# Patient Record
Sex: FEMALE | Race: WHITE | Employment: OTHER | URBAN - METROPOLITAN AREA
[De-identification: names, ages, dates, MRNs, and addresses within clinical notes are randomized per-mention and may not be internally consistent; named-entity substitution may affect disease eponyms.]

---

## 2017-02-04 ENCOUNTER — TELEPHONE (OUTPATIENT)
Dept: FAMILY MEDICINE CLINIC | Facility: CLINIC | Age: 82
End: 2017-02-04

## 2017-02-04 NOTE — TELEPHONE ENCOUNTER
Spoke with patient, has vomited 2 x since 4:00am,  states he gave her tylenol at 5:00am, Pt does recall taking this,  report giving her small amounts of liquid now and she has kept it down, discussed the importance of saying hydrated, however

## 2017-02-27 ENCOUNTER — OFFICE VISIT (OUTPATIENT)
Dept: FAMILY MEDICINE CLINIC | Facility: CLINIC | Age: 82
End: 2017-02-27

## 2017-02-27 VITALS
RESPIRATION RATE: 24 BRPM | HEIGHT: 60.5 IN | SYSTOLIC BLOOD PRESSURE: 110 MMHG | WEIGHT: 191.63 LBS | BODY MASS INDEX: 36.65 KG/M2 | HEART RATE: 64 BPM | TEMPERATURE: 98 F | DIASTOLIC BLOOD PRESSURE: 60 MMHG

## 2017-02-27 DIAGNOSIS — E11.3293 MILD NONPROLIFERATIVE DIABETIC RETINOPATHY OF BOTH EYES ASSOCIATED WITH TYPE 2 DIABETES MELLITUS, MACULAR EDEMA PRESENCE UNSPECIFIED (HCC): ICD-10-CM

## 2017-02-27 DIAGNOSIS — E03.9 ACQUIRED HYPOTHYROIDISM: ICD-10-CM

## 2017-02-27 DIAGNOSIS — N39.46 MIXED INCONTINENCE: ICD-10-CM

## 2017-02-27 DIAGNOSIS — R80.9 MICROALBUMINURIA DUE TO TYPE 2 DIABETES MELLITUS (HCC): ICD-10-CM

## 2017-02-27 DIAGNOSIS — Z86.19 HISTORY OF HEPATITIS: ICD-10-CM

## 2017-02-27 DIAGNOSIS — M54.50 CHRONIC MIDLINE LOW BACK PAIN WITHOUT SCIATICA: ICD-10-CM

## 2017-02-27 DIAGNOSIS — E78.2 MIXED HYPERLIPIDEMIA: ICD-10-CM

## 2017-02-27 DIAGNOSIS — I10 HYPERTENSION, BENIGN: ICD-10-CM

## 2017-02-27 DIAGNOSIS — G89.29 CHRONIC MIDLINE LOW BACK PAIN WITHOUT SCIATICA: ICD-10-CM

## 2017-02-27 DIAGNOSIS — I25.10 CORONARY ARTERY DISEASE INVOLVING NATIVE CORONARY ARTERY OF NATIVE HEART WITHOUT ANGINA PECTORIS: ICD-10-CM

## 2017-02-27 DIAGNOSIS — E11.29 MICROALBUMINURIA DUE TO TYPE 2 DIABETES MELLITUS (HCC): ICD-10-CM

## 2017-02-27 DIAGNOSIS — B02.29 POST HERPETIC NEURALGIA: ICD-10-CM

## 2017-02-27 DIAGNOSIS — Z11.59 NEED FOR HEPATITIS C SCREENING TEST: ICD-10-CM

## 2017-02-27 DIAGNOSIS — N18.30 TYPE 2 DIABETES MELLITUS WITH STAGE 3 CHRONIC KIDNEY DISEASE, WITHOUT LONG-TERM CURRENT USE OF INSULIN (HCC): Primary | ICD-10-CM

## 2017-02-27 DIAGNOSIS — E11.22 TYPE 2 DIABETES MELLITUS WITH STAGE 3 CHRONIC KIDNEY DISEASE, WITHOUT LONG-TERM CURRENT USE OF INSULIN (HCC): Primary | ICD-10-CM

## 2017-02-27 PROCEDURE — 99214 OFFICE O/P EST MOD 30 MIN: CPT | Performed by: FAMILY MEDICINE

## 2017-02-27 RX ORDER — VALACYCLOVIR HYDROCHLORIDE 1 G/1
1 TABLET, FILM COATED ORAL EVERY 12 HOURS SCHEDULED
Qty: 14 TABLET | Refills: 0 | Status: SHIPPED | OUTPATIENT
Start: 2017-02-27 | End: 2017-03-06

## 2017-02-27 NOTE — PROGRESS NOTES
Patient presents with:  Blood Pressure: 6 month BP check. Has not had labs done. HPI:   Miko Thornton is a [de-identified]year old female with PMH CAD stenting in 1990's, DM2, HLD, hypothyroidism, HTN who presents to the office for 6 mo check up.     DM2 - with rhonchi, symmetric air entry  Heart - normal rate, regular rhythm, normal S1, S2, no murmurs, rubs, clicks or gallops  Abdomen - soft, nontender, nondistended, no masses or organomegaly  Neurological - alert, oriented, normal speech, no focal findings or m meds (tolerodine or oxybutynin)    10. History of hepatitis  Check labs  ami melgar h/o infectious hep, but cannot remember what. - Hepatitis B Surface Antigen [E]; Future  - Hepatitis B Surface Antibody [E]; Future  - HCV Antibody [E]; Future    11.  Mi

## 2017-03-06 ENCOUNTER — LAB ENCOUNTER (OUTPATIENT)
Dept: LAB | Age: 82
End: 2017-03-06
Attending: FAMILY MEDICINE
Payer: MEDICARE

## 2017-03-06 DIAGNOSIS — Z11.59 NEED FOR HEPATITIS C SCREENING TEST: ICD-10-CM

## 2017-03-06 DIAGNOSIS — R80.9 MICROALBUMINURIA DUE TO TYPE 2 DIABETES MELLITUS (HCC): ICD-10-CM

## 2017-03-06 DIAGNOSIS — E11.29 MICROALBUMINURIA DUE TO TYPE 2 DIABETES MELLITUS (HCC): ICD-10-CM

## 2017-03-06 DIAGNOSIS — E11.22 TYPE 2 DIABETES MELLITUS WITH STAGE 3 CHRONIC KIDNEY DISEASE, WITHOUT LONG-TERM CURRENT USE OF INSULIN (HCC): ICD-10-CM

## 2017-03-06 DIAGNOSIS — Z86.19 HISTORY OF HEPATITIS: ICD-10-CM

## 2017-03-06 DIAGNOSIS — E03.9 ACQUIRED HYPOTHYROIDISM: ICD-10-CM

## 2017-03-06 DIAGNOSIS — N18.30 TYPE 2 DIABETES MELLITUS WITH STAGE 3 CHRONIC KIDNEY DISEASE, WITHOUT LONG-TERM CURRENT USE OF INSULIN (HCC): ICD-10-CM

## 2017-03-06 DIAGNOSIS — I25.10 CORONARY ARTERY DISEASE INVOLVING NATIVE CORONARY ARTERY OF NATIVE HEART WITHOUT ANGINA PECTORIS: ICD-10-CM

## 2017-03-06 DIAGNOSIS — I10 HYPERTENSION, BENIGN: ICD-10-CM

## 2017-03-06 LAB
ALBUMIN SERPL-MCNC: 3.4 G/DL (ref 3.5–4.8)
ALP LIVER SERPL-CCNC: 85 U/L (ref 55–142)
ALT SERPL-CCNC: 65 U/L (ref 14–54)
AST SERPL-CCNC: 44 U/L (ref 15–41)
BILIRUB SERPL-MCNC: 0.3 MG/DL (ref 0.1–2)
BUN BLD-MCNC: 38 MG/DL (ref 8–20)
CALCIUM BLD-MCNC: 9.6 MG/DL (ref 8.3–10.3)
CHLORIDE: 106 MMOL/L (ref 101–111)
CHOLEST SMN-MCNC: 152 MG/DL (ref ?–200)
CO2: 27 MMOL/L (ref 22–32)
CREAT BLD-MCNC: 2.14 MG/DL (ref 0.55–1.02)
CREAT UR-SCNC: 85.6 MG/DL
EST. AVERAGE GLUCOSE BLD GHB EST-MCNC: 143 MG/DL (ref 68–126)
FREE T4: 1.5 NG/DL (ref 0.9–1.8)
GLUCOSE BLD-MCNC: 98 MG/DL (ref 70–99)
HBA1C MFR BLD HPLC: 6.6 % (ref ?–5.7)
HBV SURFACE AB SER QL: NONREACTIVE
HBV SURFACE AB SERPL IA-ACNC: <3.1 MIU/ML
HBV SURFACE AG SERPL QL IA: NONREACTIVE
HDLC SERPL-MCNC: 51 MG/DL (ref 45–?)
HDLC SERPL: 2.98 {RATIO} (ref ?–4.44)
HEPATITIS C VIRUS AB INTERPRETATION: NONREACTIVE
LDLC SERPL CALC-MCNC: 77 MG/DL (ref ?–130)
M PROTEIN MFR SERPL ELPH: 6.9 G/DL (ref 6.1–8.3)
MICROALBUMIN UR-MCNC: 10.2 MG/DL
MICROALBUMIN/CREAT 24H UR-RTO: 119.2 UG/MG (ref ?–30)
NONHDLC SERPL-MCNC: 101 MG/DL (ref ?–130)
POTASSIUM SERPL-SCNC: 3.4 MMOL/L (ref 3.6–5.1)
SODIUM SERPL-SCNC: 144 MMOL/L (ref 136–144)
TRIGLYCERIDES: 121 MG/DL (ref ?–150)
TSI SER-ACNC: 0.24 MIU/ML (ref 0.35–5.5)
VLDL: 24 MG/DL (ref 5–40)

## 2017-03-06 PROCEDURE — 83036 HEMOGLOBIN GLYCOSYLATED A1C: CPT

## 2017-03-06 PROCEDURE — 86706 HEP B SURFACE ANTIBODY: CPT

## 2017-03-06 PROCEDURE — 87340 HEPATITIS B SURFACE AG IA: CPT

## 2017-03-06 PROCEDURE — 84443 ASSAY THYROID STIM HORMONE: CPT

## 2017-03-06 PROCEDURE — 82043 UR ALBUMIN QUANTITATIVE: CPT

## 2017-03-06 PROCEDURE — 36415 COLL VENOUS BLD VENIPUNCTURE: CPT

## 2017-03-06 PROCEDURE — 82570 ASSAY OF URINE CREATININE: CPT

## 2017-03-06 PROCEDURE — 80061 LIPID PANEL: CPT

## 2017-03-06 PROCEDURE — 80053 COMPREHEN METABOLIC PANEL: CPT

## 2017-03-06 PROCEDURE — 86803 HEPATITIS C AB TEST: CPT

## 2017-03-06 PROCEDURE — 84439 ASSAY OF FREE THYROXINE: CPT

## 2017-03-08 DIAGNOSIS — R94.4 ABNORMAL RESULTS OF KIDNEY FUNCTION STUDIES: Primary | ICD-10-CM

## 2017-03-12 DIAGNOSIS — E78.2 MIXED HYPERLIPIDEMIA: Primary | ICD-10-CM

## 2017-03-14 RX ORDER — ROSUVASTATIN CALCIUM 40 MG/1
TABLET, COATED ORAL
Qty: 90 TABLET | Refills: 1 | Status: SHIPPED | OUTPATIENT
Start: 2017-03-14 | End: 2017-08-28

## 2017-03-14 NOTE — TELEPHONE ENCOUNTER
Medication refilled per protocol.     LOV 2/27/2017    Future Appointments  Date Time Provider Lissette Kilpatricki   4/17/2017 9:40 AM Karmen Mock MD [facility]   8/28/2017 8:30 AM Karri Diaz MD [facility]         Pending Prescriptio

## 2017-04-11 ENCOUNTER — APPOINTMENT (OUTPATIENT)
Dept: LAB | Age: 82
End: 2017-04-11
Attending: FAMILY MEDICINE
Payer: MEDICARE

## 2017-04-11 DIAGNOSIS — R94.4 ABNORMAL RESULTS OF KIDNEY FUNCTION STUDIES: ICD-10-CM

## 2017-04-11 PROCEDURE — 80048 BASIC METABOLIC PNL TOTAL CA: CPT

## 2017-04-11 PROCEDURE — 36415 COLL VENOUS BLD VENIPUNCTURE: CPT

## 2017-04-12 DIAGNOSIS — R94.4 ABNORMAL RESULTS OF KIDNEY FUNCTION STUDIES: Primary | ICD-10-CM

## 2017-04-17 PROBLEM — I25.10 CORONARY ARTERY DISEASE INVOLVING NATIVE CORONARY ARTERY OF NATIVE HEART WITHOUT ANGINA PECTORIS: Status: ACTIVE | Noted: 2017-04-17

## 2017-04-17 PROBLEM — R94.31 NONSPECIFIC ABNORMAL ELECTROCARDIOGRAM (ECG) (EKG): Status: ACTIVE | Noted: 2017-04-17

## 2017-04-18 ENCOUNTER — OFFICE VISIT (OUTPATIENT)
Dept: NEPHROLOGY | Facility: CLINIC | Age: 82
End: 2017-04-18

## 2017-04-18 VITALS
SYSTOLIC BLOOD PRESSURE: 70 MMHG | RESPIRATION RATE: 16 BRPM | HEART RATE: 68 BPM | DIASTOLIC BLOOD PRESSURE: 40 MMHG | BODY MASS INDEX: 37 KG/M2 | WEIGHT: 194 LBS

## 2017-04-18 DIAGNOSIS — I95.9 HYPOTENSION, UNSPECIFIED HYPOTENSION TYPE: ICD-10-CM

## 2017-04-18 DIAGNOSIS — N18.3 CKD (CHRONIC KIDNEY DISEASE), STAGE 3 (MODERATE): ICD-10-CM

## 2017-04-18 DIAGNOSIS — N17.9 AKI (ACUTE KIDNEY INJURY) (HCC): Primary | ICD-10-CM

## 2017-04-18 PROCEDURE — 99204 OFFICE O/P NEW MOD 45 MIN: CPT | Performed by: INTERNAL MEDICINE

## 2017-04-18 NOTE — PROGRESS NOTES
Nephrology Consult Note    REASON FOR CONSULT: LUCIEN / CKD     ASSESSMENT/PLAN:        1) LUCIEN / CKD- recently higher serum creatinine exceeding 2 mg/dL (previously < 1 mg/dl in 2015) likely reflects renal hypoperfusion due to relative hypotension; she is ort history of obstructive uropathy. She denies chronic analgesic use. There is no family history of kidney disease. There is no history of lung or liver disease. She is otherwise been in good health overall.     ROS:    Denies fever/chills  Denies wt loss/ EVERY DAY Disp: 90 tablet Rfl: 3   metoprolol Tartrate 25 MG Oral Tab Take 1 tablet (25 mg total) by mouth 2 (two) times daily. Disp: 180 tablet Rfl: 3   Multiple Vitamin (DAILY VITAMIN) Oral Tab Take by mouth.  Disp:  Rfl:    acetaminophen (TYLENOL) 325 MG Regular rate and rhythm, S1, S2 normal, no murmur or rub  Lungs: Clear without wheezes, rales, rhonchi. Abdomen: Soft, non-tender. + bowel sounds, no palpable organomegaly  Extremities: Without clubbing, cyanosis or edema.   Neurologic:  normal affect, c

## 2017-05-02 ENCOUNTER — APPOINTMENT (OUTPATIENT)
Dept: LAB | Age: 82
End: 2017-05-02
Attending: INTERNAL MEDICINE
Payer: MEDICARE

## 2017-05-02 DIAGNOSIS — N18.3 CKD (CHRONIC KIDNEY DISEASE), STAGE 3 (MODERATE): ICD-10-CM

## 2017-05-02 DIAGNOSIS — I95.9 HYPOTENSION, UNSPECIFIED HYPOTENSION TYPE: ICD-10-CM

## 2017-05-02 DIAGNOSIS — N17.9 AKI (ACUTE KIDNEY INJURY) (HCC): ICD-10-CM

## 2017-05-02 PROCEDURE — 86255 FLUORESCENT ANTIBODY SCREEN: CPT

## 2017-05-02 PROCEDURE — 81003 URINALYSIS AUTO W/O SCOPE: CPT

## 2017-05-02 PROCEDURE — 83883 ASSAY NEPHELOMETRY NOT SPEC: CPT

## 2017-05-02 PROCEDURE — 80048 BASIC METABOLIC PNL TOTAL CA: CPT

## 2017-05-02 PROCEDURE — 83876 ASSAY MYELOPEROXIDASE: CPT

## 2017-05-02 PROCEDURE — 84165 PROTEIN E-PHORESIS SERUM: CPT

## 2017-05-02 PROCEDURE — 83516 IMMUNOASSAY NONANTIBODY: CPT

## 2017-05-02 PROCEDURE — 36415 COLL VENOUS BLD VENIPUNCTURE: CPT

## 2017-05-02 PROCEDURE — 86334 IMMUNOFIX E-PHORESIS SERUM: CPT

## 2017-05-11 ENCOUNTER — OFFICE VISIT (OUTPATIENT)
Dept: NEPHROLOGY | Facility: CLINIC | Age: 82
End: 2017-05-11

## 2017-05-11 VITALS
DIASTOLIC BLOOD PRESSURE: 60 MMHG | WEIGHT: 194.75 LBS | OXYGEN SATURATION: 98 % | RESPIRATION RATE: 16 BRPM | HEART RATE: 71 BPM | SYSTOLIC BLOOD PRESSURE: 120 MMHG | BODY MASS INDEX: 37 KG/M2

## 2017-05-11 DIAGNOSIS — I95.9 HYPOTENSION, UNSPECIFIED HYPOTENSION TYPE: ICD-10-CM

## 2017-05-11 DIAGNOSIS — N18.30 CKD (CHRONIC KIDNEY DISEASE) STAGE 3, GFR 30-59 ML/MIN (HCC): ICD-10-CM

## 2017-05-11 DIAGNOSIS — N17.9 AKI (ACUTE KIDNEY INJURY) (HCC): Primary | ICD-10-CM

## 2017-05-11 PROCEDURE — 99214 OFFICE O/P EST MOD 30 MIN: CPT | Performed by: INTERNAL MEDICINE

## 2017-05-11 NOTE — PROGRESS NOTES
Nephrology Progress Note      ASSESSMENT/PLAN:         1) LUCIEN / CKD- recently higher serum creatinine 2.45 mg/dL was due to symptomatic hypotension leading to renal hypoperfusion; she had been orthostatic over the previous several weeks and had a seated BP CABG  1995    Comment x5    SURGICAL STENT      Comment Coronary Artery Stent    LEG/ANKLE SURGERY PROC UNLISTED  1997    Comment angioplasty of left leg    LEG/ANKLE SURGERY PROC UNLISTED  05/2002    Comment angioplasty of right leg    TONSILLECTOMY  1 mouth daily.  Disp:  Rfl:        Allergies:  No Known Allergies    ROS:     Denies fever/chills  Denies wt loss/gain  Denies HA or visual changes  Denies CP or palpitations  Denies SOB/cough/hemoptysis  Denies abd or flank pain  Denies N/V/D  Denies change

## 2017-06-15 ENCOUNTER — OFFICE VISIT (OUTPATIENT)
Dept: FAMILY MEDICINE CLINIC | Facility: CLINIC | Age: 82
End: 2017-06-15

## 2017-06-15 VITALS
TEMPERATURE: 98 F | WEIGHT: 192 LBS | HEART RATE: 72 BPM | SYSTOLIC BLOOD PRESSURE: 100 MMHG | BODY MASS INDEX: 37 KG/M2 | DIASTOLIC BLOOD PRESSURE: 60 MMHG | RESPIRATION RATE: 14 BRPM

## 2017-06-15 DIAGNOSIS — J20.9 ACUTE BRONCHITIS WITH SYMPTOMS GREATER THAN 10 DAYS: Primary | ICD-10-CM

## 2017-06-15 DIAGNOSIS — E11.22 TYPE 2 DIABETES MELLITUS WITH STAGE 3 CHRONIC KIDNEY DISEASE, WITHOUT LONG-TERM CURRENT USE OF INSULIN (HCC): ICD-10-CM

## 2017-06-15 DIAGNOSIS — N18.30 TYPE 2 DIABETES MELLITUS WITH STAGE 3 CHRONIC KIDNEY DISEASE, WITHOUT LONG-TERM CURRENT USE OF INSULIN (HCC): ICD-10-CM

## 2017-06-15 PROCEDURE — 99213 OFFICE O/P EST LOW 20 MIN: CPT | Performed by: FAMILY MEDICINE

## 2017-06-15 RX ORDER — AZITHROMYCIN 250 MG/1
TABLET, FILM COATED ORAL
Qty: 6 TABLET | Refills: 0 | Status: SHIPPED | OUTPATIENT
Start: 2017-06-15 | End: 2017-08-28 | Stop reason: ALTCHOICE

## 2017-06-15 NOTE — PROGRESS NOTES
Patient presents with:  Chest Congestion: for three days now- with sone night sweats  Cough: with thick yellow sputum- taking Mucinex ans Tylenol      HPI:   This is a 81year old female coming in for 3 days of cough and phlegm, wheezing tosya as well  No Negative for joint swelling. Skin: Negative. Negative for rash. Neurological: Negative. Negative for dizziness. EXAM:   /60 mmHg  Pulse 72  Temp(Src) 97.7 °F (36.5 °C) (Oral)  Resp 14  Wt 192 lb Body mass index is 36.87 kg/(m^2).    Physi lung fields with a bronchial breath sounds but clear on the right worrisome for possible early pneumonia, start Z-Germán and Dulera inhaler samples given, 2 puffs twice daily for 2 weeks, follow-up with Dr. Yvonne Boston if no great improvement and chest x-ray arran

## 2017-06-16 ENCOUNTER — HOSPITAL ENCOUNTER (OUTPATIENT)
Dept: GENERAL RADIOLOGY | Age: 82
Discharge: HOME | End: 2017-06-16
Attending: FAMILY MEDICINE
Payer: MEDICARE

## 2017-06-16 DIAGNOSIS — J20.9 ACUTE BRONCHITIS WITH SYMPTOMS GREATER THAN 10 DAYS: ICD-10-CM

## 2017-06-16 PROCEDURE — 71020 XR CHEST PA + LAT CHEST (CPT=71020): CPT | Performed by: FAMILY MEDICINE

## 2017-07-19 ENCOUNTER — PATIENT OUTREACH (OUTPATIENT)
Dept: CASE MANAGEMENT | Age: 82
End: 2017-07-19

## 2017-07-19 NOTE — PROGRESS NOTES
Spoke with pt to intro CCM. Pt requested more info, which will be sent in mail. Pt would also like to see if  is candidate and if he can be sent info as well.

## 2017-08-02 ENCOUNTER — PATIENT OUTREACH (OUTPATIENT)
Dept: CASE MANAGEMENT | Age: 82
End: 2017-08-02

## 2017-08-02 NOTE — PROGRESS NOTES
Called pt back regarding CCM info that was sent. Stated did received info for her and spouse and would like to speak with Dr. Harry Diaz about CCM program at next office visit 8/28. Will contact pt after 8/28 office visit.

## 2017-08-21 ENCOUNTER — TELEPHONE (OUTPATIENT)
Dept: FAMILY MEDICINE CLINIC | Facility: CLINIC | Age: 82
End: 2017-08-21

## 2017-08-21 NOTE — TELEPHONE ENCOUNTER
Patient is calling she said she is coming in for her cholesterol follow up on 8/28/17 and wants to know if Dr. Nas Caruso wants her lipids checked prior to her appt.

## 2017-08-21 NOTE — TELEPHONE ENCOUNTER
Would you like pt to recheck Lipids now ? Pt coming in on 08/28/17 for appt. Last Lipid 03/06/17. Routed to Dr Yunior Ly.

## 2017-08-28 ENCOUNTER — OFFICE VISIT (OUTPATIENT)
Dept: FAMILY MEDICINE CLINIC | Facility: CLINIC | Age: 82
End: 2017-08-28

## 2017-08-28 VITALS
HEART RATE: 72 BPM | TEMPERATURE: 98 F | DIASTOLIC BLOOD PRESSURE: 58 MMHG | BODY MASS INDEX: 37.03 KG/M2 | HEIGHT: 60.75 IN | SYSTOLIC BLOOD PRESSURE: 120 MMHG | RESPIRATION RATE: 20 BRPM | WEIGHT: 193.63 LBS

## 2017-08-28 DIAGNOSIS — I10 HYPERTENSION, BENIGN: Primary | ICD-10-CM

## 2017-08-28 DIAGNOSIS — E78.2 MIXED HYPERLIPIDEMIA: ICD-10-CM

## 2017-08-28 DIAGNOSIS — E03.9 ACQUIRED HYPOTHYROIDISM: ICD-10-CM

## 2017-08-28 DIAGNOSIS — K21.9 GASTROESOPHAGEAL REFLUX DISEASE, ESOPHAGITIS PRESENCE NOT SPECIFIED: ICD-10-CM

## 2017-08-28 DIAGNOSIS — M17.0 BILATERAL PRIMARY OSTEOARTHRITIS OF KNEE: ICD-10-CM

## 2017-08-28 DIAGNOSIS — I25.812 CORONARY ARTERY DISEASE INVOLVING BYPASS GRAFT OF TRANSPLANTED HEART, ANGINA PRESENCE UNSPECIFIED: ICD-10-CM

## 2017-08-28 DIAGNOSIS — N39.46 MIXED INCONTINENCE: ICD-10-CM

## 2017-08-28 PROCEDURE — 99214 OFFICE O/P EST MOD 30 MIN: CPT | Performed by: FAMILY MEDICINE

## 2017-08-28 RX ORDER — PANTOPRAZOLE SODIUM 40 MG/1
40 TABLET, DELAYED RELEASE ORAL DAILY
Qty: 90 TABLET | Refills: 3 | Status: SHIPPED | OUTPATIENT
Start: 2017-08-28 | End: 2017-10-28

## 2017-08-28 RX ORDER — ROSUVASTATIN CALCIUM 40 MG/1
40 TABLET, COATED ORAL
Qty: 90 TABLET | Refills: 3 | Status: SHIPPED | OUTPATIENT
Start: 2017-08-28 | End: 2018-09-09

## 2017-08-28 RX ORDER — EZETIMIBE 10 MG/1
10 TABLET ORAL DAILY
Qty: 90 TABLET | Refills: 3 | Status: SHIPPED | OUTPATIENT
Start: 2017-08-28 | End: 2018-10-22

## 2017-08-28 RX ORDER — LEVOTHYROXINE SODIUM 88 UG/1
TABLET ORAL
Qty: 90 TABLET | Refills: 3 | Status: SHIPPED | OUTPATIENT
Start: 2017-08-28 | End: 2018-03-01

## 2017-08-28 NOTE — PROGRESS NOTES
Patient presents with:  Blood Pressure: 6 month BP check   To discuss CCM program     HPI:   Diann Nava is a 81year old female with PMH HLD, hypothyroidism, HTN, CAD, DM2 who presents to the office for 6 mo check up    HTN - BP controlled at this poi symmetric  Neurologic: Alert and oriented X 3, normal strength and tone. Normal symmetric reflexes.  Normal coordination and gait      Component      Latest Ref Rng & Units 5/2/2017   URINE-COLOR      Yellow Straw   CLARITY URINE      Clear Clear   SPECIFIC Pressure (6 month BP check   To discuss CCM program). 1. Hypertension, benign  Controlled  No more ACEI or ARB for kidney reasons. BP remains controlled.    - metoprolol Tartrate 25 MG Oral Tab; TAKE 1 TABLET(25 MG) BY MOUTH TWICE DAILY  Dispense: 180 t

## 2017-09-20 ENCOUNTER — PATIENT OUTREACH (OUTPATIENT)
Dept: CASE MANAGEMENT | Age: 82
End: 2017-09-20

## 2017-09-20 NOTE — PROGRESS NOTES
Contacted  Pt to follow up on CCM.  Pt stated that she is not interested and declined participation in program.

## 2017-09-27 ENCOUNTER — TELEPHONE (OUTPATIENT)
Dept: FAMILY MEDICINE CLINIC | Facility: CLINIC | Age: 82
End: 2017-09-27

## 2017-10-03 ENCOUNTER — OFFICE VISIT (OUTPATIENT)
Dept: FAMILY MEDICINE CLINIC | Facility: CLINIC | Age: 82
End: 2017-10-03

## 2017-10-03 VITALS
SYSTOLIC BLOOD PRESSURE: 116 MMHG | HEART RATE: 68 BPM | WEIGHT: 192 LBS | TEMPERATURE: 98 F | RESPIRATION RATE: 16 BRPM | BODY MASS INDEX: 36.72 KG/M2 | DIASTOLIC BLOOD PRESSURE: 64 MMHG | HEIGHT: 60.5 IN

## 2017-10-03 DIAGNOSIS — Z78.0 POSTMENOPAUSAL: ICD-10-CM

## 2017-10-03 DIAGNOSIS — Z13.31 DEPRESSION SCREENING: ICD-10-CM

## 2017-10-03 DIAGNOSIS — N18.30 TYPE 2 DIABETES MELLITUS WITH STAGE 3 CHRONIC KIDNEY DISEASE, WITHOUT LONG-TERM CURRENT USE OF INSULIN (HCC): ICD-10-CM

## 2017-10-03 DIAGNOSIS — E03.9 ACQUIRED HYPOTHYROIDISM: ICD-10-CM

## 2017-10-03 DIAGNOSIS — R80.9 MICROALBUMINURIA DUE TO TYPE 2 DIABETES MELLITUS (HCC): ICD-10-CM

## 2017-10-03 DIAGNOSIS — I10 HYPERTENSION, BENIGN: ICD-10-CM

## 2017-10-03 DIAGNOSIS — E11.3293 MILD NONPROLIFERATIVE DIABETIC RETINOPATHY OF BOTH EYES WITHOUT MACULAR EDEMA ASSOCIATED WITH TYPE 2 DIABETES MELLITUS (HCC): ICD-10-CM

## 2017-10-03 DIAGNOSIS — E11.22 TYPE 2 DIABETES MELLITUS WITH STAGE 3 CHRONIC KIDNEY DISEASE, WITHOUT LONG-TERM CURRENT USE OF INSULIN (HCC): ICD-10-CM

## 2017-10-03 DIAGNOSIS — E11.29 MICROALBUMINURIA DUE TO TYPE 2 DIABETES MELLITUS (HCC): ICD-10-CM

## 2017-10-03 DIAGNOSIS — Z12.31 VISIT FOR SCREENING MAMMOGRAM: ICD-10-CM

## 2017-10-03 DIAGNOSIS — Z00.00 ENCOUNTER FOR ANNUAL HEALTH EXAMINATION: Primary | ICD-10-CM

## 2017-10-03 DIAGNOSIS — E55.9 VITAMIN D DEFICIENCY: ICD-10-CM

## 2017-10-03 DIAGNOSIS — I25.10 CORONARY ARTERY DISEASE INVOLVING NATIVE CORONARY ARTERY OF NATIVE HEART WITHOUT ANGINA PECTORIS: ICD-10-CM

## 2017-10-03 DIAGNOSIS — Z23 NEED FOR VACCINATION: ICD-10-CM

## 2017-10-03 DIAGNOSIS — E78.2 MIXED HYPERLIPIDEMIA: ICD-10-CM

## 2017-10-03 PROBLEM — R94.31 NONSPECIFIC ABNORMAL ELECTROCARDIOGRAM (ECG) (EKG): Status: RESOLVED | Noted: 2017-04-17 | Resolved: 2017-10-03

## 2017-10-03 PROCEDURE — G0444 DEPRESSION SCREEN ANNUAL: HCPCS | Performed by: FAMILY MEDICINE

## 2017-10-03 PROCEDURE — 99214 OFFICE O/P EST MOD 30 MIN: CPT | Performed by: FAMILY MEDICINE

## 2017-10-03 PROCEDURE — G0439 PPPS, SUBSEQ VISIT: HCPCS | Performed by: FAMILY MEDICINE

## 2017-10-03 PROCEDURE — 90653 IIV ADJUVANT VACCINE IM: CPT | Performed by: FAMILY MEDICINE

## 2017-10-03 PROCEDURE — 90732 PPSV23 VACC 2 YRS+ SUBQ/IM: CPT | Performed by: FAMILY MEDICINE

## 2017-10-03 PROCEDURE — G0009 ADMIN PNEUMOCOCCAL VACCINE: HCPCS | Performed by: FAMILY MEDICINE

## 2017-10-03 PROCEDURE — G0008 ADMIN INFLUENZA VIRUS VAC: HCPCS | Performed by: FAMILY MEDICINE

## 2017-10-03 NOTE — PROGRESS NOTES
HPI:   Susanna Bassett is a 81year old female who presents for a Medicare Subsequent Annual Wellness visit (Pt already had Initial Annual Wellness). Preventative  Breast: 6/2015  Colon: no indication. Immunizations: PNA 13 last yr. Flu annually.   El ALT 65 (H) 03/06/2017   CA 9.5 05/02/2017   ALB 3.4 (L) 03/06/2017   TSH 0.240 (L) 03/06/2017   CREATSERUM 1.43 (H) 05/02/2017   GLU 96 05/02/2017        CBC  (most recent labs)     Lab Results  Component Value Date   WBC 5.5 12/10/2015   HGB 12.8 12/10/ hysterectomy (1985); hysterectomy; angioplasty (coronary) (5/30/02); and angioplasty (coronary) (6/23/04). Her family history includes Heart Disease in her brother and mother. SOCIAL HISTORY:   She  reports that she quit smoking about 22 years ago.  Sh Nose: Nares normal, septum midline, mucosa normal, no drainage or sinus tenderness   Throat: Lips, mucosa, and tongue normal; teeth and gums normal   Neck: Supple, symmetrical, trachea midline, no adenopathy, thyroid: not enlarged, symmetric, no tenderne DEPRESSION SCREEN ANNUAL    5. Need for vaccination  Given x 2  - PNEUMOCOCCAL IMM (PNEUMOVAX)  - FLU VACCINE ADJUVANT IM    6.  Coronary artery disease involving native coronary artery of native heart without angina pectoris  No current cardiac symptoms  ; not have a Power of  for [employer] on file in [address] [place] [address] Rd. Discussed with patient and provided information           PLAN:  The patient indicates understanding of these issues and agrees to the plan. Return in 6 months (on 4/3/2018).      Sherice Bagley memory issues?: 0-No    Fall/Risk Scorin    Scoring Interpretation: 4+ At Risk     Depression Screening (PHQ-2/PHQ-9): Over the LAST 2 WEEKS   Little interest or pleasure in doing things (over the last two weeks)?: Not at all    Feeling down, depressed Ophthalmology Visit Annually: Diabetics, FHx Glaucoma, AA>50, > 65 Data entered on: 11/19/2016   Last Dilated Eye Exam 11/19/2016       Bone Density Screening      Dexascan Every two years Last Dexa Scan:   XR DEXA BONE DENSITOMETRY (CPT=77080) 06/ diuretics, anticonvulsants.)    Potassium  Annually Potassium (mmol/L)   Date Value   05/02/2017 3.9     POTASSIUM (mmol/L)   Date Value   03/21/2014 4.0   02/05/2013 3.9    No flowsheet data found.     Creatinine  Annually CREATININE (mg/dL)   Date Value

## 2017-10-03 NOTE — PATIENT INSTRUCTIONS
[address] SCREENING SCHEDULE   Tests on this list are recommended by your physician but may not be covered, or covered at this frequency, by your insurer. Please check with your insurance carrier before scheduling to verify coverage.    PREVENTATI previous visit.  Limited to patients who meet one of the following criteria:   • Men who are 73-68 years old and have smoked more than 100 cigarettes in their lifetime   • Anyone with a family history    Colorectal Cancer Screening  Covered up to Age 76 due on 06/10/2016 Please get this Mammogram regularly   Immunizations      Influenza  Covered Annually   Orders placed or performed in visit on 09/27/16  -FLU VAC NO PRSV 4 SHERMAN 3 YRS+   Orders placed or performed in visit on 10/07/15  -FLU VAC NO PRSV 4 [place] information including definitions of the different types of Advance Directives. It also has the State forms available on it's website for anyone to review and print using their home computer and printer. (the forms are also available in [address])  [email or web address] "[email or web address]

## 2017-10-16 ENCOUNTER — TELEPHONE (OUTPATIENT)
Dept: FAMILY MEDICINE CLINIC | Facility: CLINIC | Age: 82
End: 2017-10-16

## 2017-10-19 PROBLEM — E78.00 HYPERCHOLESTEROLEMIA: Status: ACTIVE | Noted: 2017-10-19

## 2017-10-28 DIAGNOSIS — K21.9 GASTROESOPHAGEAL REFLUX DISEASE, ESOPHAGITIS PRESENCE NOT SPECIFIED: ICD-10-CM

## 2017-10-28 DIAGNOSIS — E03.9 ACQUIRED HYPOTHYROIDISM: ICD-10-CM

## 2017-10-30 RX ORDER — PANTOPRAZOLE SODIUM 40 MG/1
TABLET, DELAYED RELEASE ORAL
Qty: 90 TABLET | Refills: 0 | Status: SHIPPED | OUTPATIENT
Start: 2017-10-30 | End: 2018-03-01

## 2017-10-30 NOTE — TELEPHONE ENCOUNTER
Called Pt and advised that Dr Ambreen Adorno would like Pt to have labs done. Asked in message that Pt call office tomorrow and advise if Pt can get blood drawn before this refill is sent.

## 2017-10-31 ENCOUNTER — APPOINTMENT (OUTPATIENT)
Dept: LAB | Age: 82
End: 2017-10-31
Attending: FAMILY MEDICINE
Payer: MEDICARE

## 2017-10-31 DIAGNOSIS — E78.2 MIXED HYPERLIPIDEMIA: ICD-10-CM

## 2017-10-31 DIAGNOSIS — I10 HYPERTENSION, BENIGN: ICD-10-CM

## 2017-10-31 DIAGNOSIS — E55.9 VITAMIN D DEFICIENCY: ICD-10-CM

## 2017-10-31 DIAGNOSIS — E11.22 TYPE 2 DIABETES MELLITUS WITH STAGE 3 CHRONIC KIDNEY DISEASE, WITHOUT LONG-TERM CURRENT USE OF INSULIN (HCC): ICD-10-CM

## 2017-10-31 DIAGNOSIS — N18.30 TYPE 2 DIABETES MELLITUS WITH STAGE 3 CHRONIC KIDNEY DISEASE, WITHOUT LONG-TERM CURRENT USE OF INSULIN (HCC): ICD-10-CM

## 2017-10-31 DIAGNOSIS — E03.9 ACQUIRED HYPOTHYROIDISM: ICD-10-CM

## 2017-10-31 PROCEDURE — 83036 HEMOGLOBIN GLYCOSYLATED A1C: CPT

## 2017-10-31 PROCEDURE — 82043 UR ALBUMIN QUANTITATIVE: CPT

## 2017-10-31 PROCEDURE — 36415 COLL VENOUS BLD VENIPUNCTURE: CPT

## 2017-10-31 PROCEDURE — 82306 VITAMIN D 25 HYDROXY: CPT

## 2017-10-31 PROCEDURE — 80061 LIPID PANEL: CPT

## 2017-10-31 PROCEDURE — 82570 ASSAY OF URINE CREATININE: CPT

## 2017-10-31 PROCEDURE — 84443 ASSAY THYROID STIM HORMONE: CPT

## 2017-10-31 PROCEDURE — 80053 COMPREHEN METABOLIC PANEL: CPT

## 2017-11-02 RX ORDER — LEVOTHYROXINE SODIUM 88 UG/1
TABLET ORAL
Qty: 90 TABLET | Refills: 0 | OUTPATIENT
Start: 2017-11-02

## 2018-01-17 DIAGNOSIS — E03.9 ACQUIRED HYPOTHYROIDISM: ICD-10-CM

## 2018-01-17 RX ORDER — LEVOTHYROXINE SODIUM 88 UG/1
TABLET ORAL
Qty: 90 TABLET | Refills: 1 | Status: SHIPPED | OUTPATIENT
Start: 2018-01-17 | End: 2018-03-01

## 2018-02-13 ENCOUNTER — TELEPHONE (OUTPATIENT)
Dept: FAMILY MEDICINE CLINIC | Facility: CLINIC | Age: 83
End: 2018-02-13

## 2018-02-13 NOTE — TELEPHONE ENCOUNTER
Patient dropped off form that she needs completed for renewal of parking placard. Patient would like mailed to  of [place] once completed.  Form in triage

## 2018-02-13 NOTE — TELEPHONE ENCOUNTER
Spoke with patient to let her know that her paperwork is complete and ready to . Let her know there is a $25 charge, she verbalized understanding.

## 2018-02-20 ENCOUNTER — HOSPITAL ENCOUNTER (OUTPATIENT)
Dept: MAMMOGRAPHY | Age: 83
Discharge: HOME | End: 2018-02-20
Attending: FAMILY MEDICINE
Payer: MEDICARE

## 2018-02-20 ENCOUNTER — HOSPITAL ENCOUNTER (OUTPATIENT)
Dept: BONE DENSITY | Age: 83
Discharge: HOME | End: 2018-02-20
Attending: FAMILY MEDICINE
Payer: MEDICARE

## 2018-02-20 DIAGNOSIS — Z12.31 VISIT FOR SCREENING MAMMOGRAM: ICD-10-CM

## 2018-02-20 DIAGNOSIS — Z78.0 POSTMENOPAUSAL: ICD-10-CM

## 2018-02-20 PROCEDURE — 77080 DXA BONE DENSITY AXIAL: CPT | Performed by: FAMILY MEDICINE

## 2018-02-20 PROCEDURE — 77067 SCR MAMMO BI INCL CAD: CPT | Performed by: FAMILY MEDICINE

## 2018-02-20 NOTE — PROGRESS NOTES
Discussed bone scan results with patient by phone telling it shows strong bones per Liana Brewer. He recommends staying active. Patient verbalizes understanding.

## 2018-03-01 ENCOUNTER — APPOINTMENT (OUTPATIENT)
Dept: GENERAL RADIOLOGY | Facility: HOSPITAL | Age: 83
DRG: 640 | End: 2018-03-01
Attending: EMERGENCY MEDICINE
Payer: MEDICARE

## 2018-03-01 ENCOUNTER — HOSPITAL ENCOUNTER (INPATIENT)
Facility: HOSPITAL | Age: 83
LOS: 3 days | Discharge: HOME | DRG: 640 | End: 2018-03-04
Attending: EMERGENCY MEDICINE | Admitting: INTERNAL MEDICINE
Payer: MEDICARE

## 2018-03-01 DIAGNOSIS — R41.0 DISORIENTATION: Primary | ICD-10-CM

## 2018-03-01 DIAGNOSIS — I95.9 HYPOTENSION, UNSPECIFIED HYPOTENSION TYPE: ICD-10-CM

## 2018-03-01 DIAGNOSIS — E87.6 HYPOKALEMIA: ICD-10-CM

## 2018-03-01 LAB
ADENOVIRUS PCR:: NEGATIVE
ALBUMIN SERPL-MCNC: 3.2 G/DL (ref 3.5–4.8)
ALP LIVER SERPL-CCNC: 70 U/L (ref 55–142)
ALT SERPL-CCNC: 58 U/L (ref 14–54)
AST SERPL-CCNC: 59 U/L (ref 15–41)
B PERT DNA SPEC QL NAA+PROBE: NEGATIVE
BASOPHILS # BLD AUTO: 0.02 X10(3) UL (ref 0–0.1)
BASOPHILS NFR BLD AUTO: 0.2 %
BILIRUB SERPL-MCNC: 0.4 MG/DL (ref 0.1–2)
BILIRUB UR QL STRIP.AUTO: NEGATIVE
BUN BLD-MCNC: 29 MG/DL (ref 8–20)
C PNEUM DNA SPEC QL NAA+PROBE: NEGATIVE
CALCIUM BLD-MCNC: 8.9 MG/DL (ref 8.3–10.3)
CHLORIDE: 103 MMOL/L (ref 101–111)
CO2: 23 MMOL/L (ref 22–32)
CORONAVIRUS 229E PCR:: NEGATIVE
CORONAVIRUS HKU1 PCR:: NEGATIVE
CORONAVIRUS NL63 PCR:: NEGATIVE
CORONAVIRUS OC43 PCR:: NEGATIVE
CREAT BLD-MCNC: 1.75 MG/DL (ref 0.55–1.02)
EOSINOPHIL # BLD AUTO: 0 X10(3) UL (ref 0–0.3)
EOSINOPHIL NFR BLD AUTO: 0 %
ERYTHROCYTE [DISTWIDTH] IN BLOOD BY AUTOMATED COUNT: 14.6 % (ref 11.5–16)
FLUAV H3 RNA SPEC QL NAA+PROBE: POSITIVE
FLUBV RNA SPEC QL NAA+PROBE: NEGATIVE
GLUCOSE BLD-MCNC: 100 MG/DL (ref 70–99)
GLUCOSE UR STRIP.AUTO-MCNC: NEGATIVE MG/DL
GRAN CASTS #/AREA URNS LPF: PRESENT /LPF
HCT VFR BLD AUTO: 39.9 % (ref 34–50)
HGB BLD-MCNC: 13.5 G/DL (ref 12–16)
IMMATURE GRANULOCYTE COUNT: 0.03 X10(3) UL (ref 0–1)
IMMATURE GRANULOCYTE RATIO %: 0.3 %
LACTIC ACID: 1.9 MMOL/L (ref 0.5–2)
LEUKOCYTE ESTERASE UR QL STRIP.AUTO: NEGATIVE
LYMPHOCYTES # BLD AUTO: 1.25 X10(3) UL (ref 0.9–4)
LYMPHOCYTES NFR BLD AUTO: 12.5 %
M PROTEIN MFR SERPL ELPH: 6.6 G/DL (ref 6.1–8.3)
MCH RBC QN AUTO: 29.5 PG (ref 27–33.2)
MCHC RBC AUTO-ENTMCNC: 33.8 G/DL (ref 31–37)
MCV RBC AUTO: 87.3 FL (ref 81–100)
METAPNEUMOVIRUS PCR:: NEGATIVE
MONOCYTES # BLD AUTO: 0.79 X10(3) UL (ref 0.1–1)
MONOCYTES NFR BLD AUTO: 7.9 %
MYCOPLASMA PNEUMONIA PCR:: NEGATIVE
NEUTROPHIL ABS PRELIM: 7.91 X10 (3) UL (ref 1.3–6.7)
NEUTROPHILS # BLD AUTO: 7.91 X10(3) UL (ref 1.3–6.7)
NEUTROPHILS NFR BLD AUTO: 79.1 %
NITRITE UR QL STRIP.AUTO: NEGATIVE
PARAINFLUENZA 1 PCR:: NEGATIVE
PARAINFLUENZA 2 PCR:: NEGATIVE
PARAINFLUENZA 3 PCR:: NEGATIVE
PARAINFLUENZA 4 PCR:: NEGATIVE
PH UR STRIP.AUTO: 5 [PH] (ref 4.5–8)
PLATELET # BLD AUTO: 169 10(3)UL (ref 150–450)
POTASSIUM SERPL-SCNC: 3.1 MMOL/L (ref 3.6–5.1)
PROT UR STRIP.AUTO-MCNC: 100 MG/DL
RBC # BLD AUTO: 4.57 X10(6)UL (ref 3.8–5.1)
RED CELL DISTRIBUTION WIDTH-SD: 46.5 FL (ref 35.1–46.3)
RHINOVIRUS/ENTERO PCR:: NEGATIVE
RSV RNA SPEC QL NAA+PROBE: NEGATIVE
SODIUM SERPL-SCNC: 139 MMOL/L (ref 136–144)
SP GR UR STRIP.AUTO: 1.03 (ref 1–1.03)
UROBILINOGEN UR STRIP.AUTO-MCNC: <2 MG/DL
WBC # BLD AUTO: 10 X10(3) UL (ref 4–13)

## 2018-03-01 PROCEDURE — 99223 1ST HOSP IP/OBS HIGH 75: CPT | Performed by: HOSPITALIST

## 2018-03-01 PROCEDURE — 71045 X-RAY EXAM CHEST 1 VIEW: CPT | Performed by: EMERGENCY MEDICINE

## 2018-03-01 RX ORDER — ACETAMINOPHEN 325 MG/1
325 TABLET ORAL EVERY 6 HOURS PRN
Status: DISCONTINUED | OUTPATIENT
Start: 2018-03-01 | End: 2018-03-04

## 2018-03-01 RX ORDER — LEVOTHYROXINE SODIUM 88 UG/1
88 TABLET ORAL
Status: DISCONTINUED | OUTPATIENT
Start: 2018-03-01 | End: 2018-03-04

## 2018-03-01 RX ORDER — ACETAMINOPHEN 325 MG/1
650 TABLET ORAL EVERY 6 HOURS PRN
Status: DISCONTINUED | OUTPATIENT
Start: 2018-03-01 | End: 2018-03-04

## 2018-03-01 RX ORDER — SODIUM CHLORIDE 9 MG/ML
INJECTION, SOLUTION INTRAVENOUS ONCE
Status: COMPLETED | OUTPATIENT
Start: 2018-03-01 | End: 2018-03-01

## 2018-03-01 RX ORDER — VALSARTAN 160 MG/1
160 TABLET ORAL DAILY
COMMUNITY
End: 2018-03-01

## 2018-03-01 RX ORDER — EZETIMIBE 10 MG/1
10 TABLET ORAL DAILY
Status: DISCONTINUED | OUTPATIENT
Start: 2018-03-01 | End: 2018-03-04

## 2018-03-01 RX ORDER — PANTOPRAZOLE SODIUM 40 MG/1
40 TABLET, DELAYED RELEASE ORAL
COMMUNITY
End: 2019-04-08

## 2018-03-01 RX ORDER — POTASSIUM CHLORIDE 20 MEQ/1
40 TABLET, EXTENDED RELEASE ORAL ONCE
Status: COMPLETED | OUTPATIENT
Start: 2018-03-01 | End: 2018-03-01

## 2018-03-01 RX ORDER — OSELTAMIVIR PHOSPHATE 75 MG/1
75 CAPSULE ORAL DAILY
Status: DISCONTINUED | OUTPATIENT
Start: 2018-03-01 | End: 2018-03-04

## 2018-03-01 RX ORDER — HEPARIN SODIUM 5000 [USP'U]/ML
5000 INJECTION, SOLUTION INTRAVENOUS; SUBCUTANEOUS EVERY 8 HOURS SCHEDULED
Status: DISCONTINUED | OUTPATIENT
Start: 2018-03-01 | End: 2018-03-04

## 2018-03-01 RX ORDER — ASPIRIN 325 MG
325 TABLET, DELAYED RELEASE (ENTERIC COATED) ORAL DAILY
Status: DISCONTINUED | OUTPATIENT
Start: 2018-03-01 | End: 2018-03-04

## 2018-03-01 RX ORDER — LEVOTHYROXINE SODIUM 88 UG/1
88 TABLET ORAL
COMMUNITY
End: 2018-10-25

## 2018-03-01 RX ORDER — ROSUVASTATIN CALCIUM 20 MG/1
40 TABLET, COATED ORAL NIGHTLY
Status: DISCONTINUED | OUTPATIENT
Start: 2018-03-01 | End: 2018-03-04

## 2018-03-01 RX ORDER — POTASSIUM CHLORIDE 20 MEQ/1
40 TABLET, EXTENDED RELEASE ORAL EVERY 4 HOURS
Status: COMPLETED | OUTPATIENT
Start: 2018-03-01 | End: 2018-03-01

## 2018-03-01 RX ORDER — SODIUM CHLORIDE 9 MG/ML
INJECTION, SOLUTION INTRAVENOUS CONTINUOUS
Status: DISCONTINUED | OUTPATIENT
Start: 2018-03-01 | End: 2018-03-04

## 2018-03-01 NOTE — ED PROVIDER NOTES
Patient Seen in: [facility] Emergency Department    History   Patient presents with:  Fever (infectious)    Stated Complaint: generalized weakness, flu-like symptoms.     HPI    28-year-old female here with her daughter for fever, fatigue, altered me 1/1/1995  Smokeless tobacco: Never Used                      Alcohol use: Yes           0.0 - 0.6 oz/week     Standard drinks or equivalent: 0 - 1 per week     Comment: 1 per month      Review of Systems    Positive for stated complaint: generalized weakne -American 31 (*)     AST 59 (*)     Alt 58 (*)     Albumin 3.2 (*)     Potassium 3.1 (*)     All other components within normal limits   CBC W/ DIFFERENTIAL - Abnormal; Notable for the following:     RDW-SD 46.5 (*)     Neutrophil Absolute Prelim 7. (primary encounter diagnosis)  Hypotension, unspecified hypotension type  Hypokalemia    Disposition:  Admit  3/1/2018  2:10 pm    Follow-up:  No follow-up provider specified.       Medications Prescribed:  Current Discharge Medication List        Present o

## 2018-03-01 NOTE — H&P
[facility]PITAL[facility]  History and Physical     Sam Toro Patient Status:  Emergency    3/15/1935 MRN [id number]   Location [address] Attending Alli Gramajo MD   [address] Day # 0 PCP Devora Hernadez MD     Chief Complaint: co No Known Allergies    Medications:    No current facility-administered medications on file prior to encounter.    Current Outpatient Prescriptions on File Prior to Encounter:  Multiple Vitamins-Minerals (OCUVITE PRESERVISION OR) Take 1 tablet by mouth daily Labs:  Recent Labs   Lab  03/01/18   1115   WBC  10.0   HGB  13.5   MCV  87.3   PLT  169.0       Recent Labs   Lab  03/01/18   1115   GLU  100*   BUN  29*   CREATSERUM  1.75*   GFRAA  31*   GFRNAA  27*   CA  8.9   ALB  3.2*   NA  139   K  3.1*   CL  10

## 2018-03-01 NOTE — PROGRESS NOTES
NURSING ADMISSION NOTE      Patient admitted via Cart  Oriented to room. Safety precautions initiated. Bed in low position. Call light in reach. Patient admitted from E.R. Patient alert and oriented x4.  Patient with occasional productive cough, gre

## 2018-03-02 PROBLEM — I95.9 HYPOTENSION: Status: ACTIVE | Noted: 2018-03-01

## 2018-03-02 LAB
BASOPHILS # BLD AUTO: 0.02 X10(3) UL (ref 0–0.1)
BASOPHILS NFR BLD AUTO: 0.3 %
BILIRUB UR QL STRIP.AUTO: NEGATIVE
BUN BLD-MCNC: 28 MG/DL (ref 8–20)
CALCIUM BLD-MCNC: 8.6 MG/DL (ref 8.3–10.3)
CHLORIDE: 110 MMOL/L (ref 101–111)
CO2: 22 MMOL/L (ref 22–32)
COLOR UR AUTO: YELLOW
CREAT BLD-MCNC: 1.25 MG/DL (ref 0.55–1.02)
EOSINOPHIL # BLD AUTO: 0 X10(3) UL (ref 0–0.3)
EOSINOPHIL NFR BLD AUTO: 0 %
ERYTHROCYTE [DISTWIDTH] IN BLOOD BY AUTOMATED COUNT: 14.9 % (ref 11.5–16)
GLUCOSE BLD-MCNC: 80 MG/DL (ref 70–99)
GLUCOSE UR STRIP.AUTO-MCNC: NEGATIVE MG/DL
HCT VFR BLD AUTO: 36.6 % (ref 34–50)
HGB BLD-MCNC: 12 G/DL (ref 12–16)
IMMATURE GRANULOCYTE COUNT: 0.01 X10(3) UL (ref 0–1)
IMMATURE GRANULOCYTE RATIO %: 0.1 %
KETONES UR STRIP.AUTO-MCNC: NEGATIVE MG/DL
LEUKOCYTE ESTERASE UR QL STRIP.AUTO: NEGATIVE
LYMPHOCYTES # BLD AUTO: 1.44 X10(3) UL (ref 0.9–4)
LYMPHOCYTES NFR BLD AUTO: 21.4 %
MCH RBC QN AUTO: 29 PG (ref 27–33.2)
MCHC RBC AUTO-ENTMCNC: 32.8 G/DL (ref 31–37)
MCV RBC AUTO: 88.4 FL (ref 81–100)
MONOCYTES # BLD AUTO: 0.56 X10(3) UL (ref 0.1–1)
MONOCYTES NFR BLD AUTO: 8.3 %
NEUTROPHIL ABS PRELIM: 4.69 X10 (3) UL (ref 1.3–6.7)
NEUTROPHILS # BLD AUTO: 4.69 X10(3) UL (ref 1.3–6.7)
NEUTROPHILS NFR BLD AUTO: 69.9 %
NITRITE UR QL STRIP.AUTO: NEGATIVE
PH UR STRIP.AUTO: 5 [PH] (ref 4.5–8)
PLATELET # BLD AUTO: 155 10(3)UL (ref 150–450)
POTASSIUM SERPL-SCNC: 5.1 MMOL/L (ref 3.6–5.1)
POTASSIUM SERPL-SCNC: 5.1 MMOL/L (ref 3.6–5.1)
PROT UR STRIP.AUTO-MCNC: 30 MG/DL
RBC # BLD AUTO: 4.14 X10(6)UL (ref 3.8–5.1)
RED CELL DISTRIBUTION WIDTH-SD: 48.2 FL (ref 35.1–46.3)
SODIUM SERPL-SCNC: 140 MMOL/L (ref 136–144)
SP GR UR STRIP.AUTO: 1.01 (ref 1–1.03)
UROBILINOGEN UR STRIP.AUTO-MCNC: <2 MG/DL
WBC # BLD AUTO: 6.7 X10(3) UL (ref 4–13)

## 2018-03-02 PROCEDURE — 99232 SBSQ HOSP IP/OBS MODERATE 35: CPT | Performed by: HOSPITALIST

## 2018-03-02 NOTE — PROGRESS NOTES
Alert,oriented. No complaint of pain. Potassium given orally this shift,Repeat lab is 5. 1. IV fluids ongoing. Low grade temp,blood cultures drawn on the 02/29. Tylenol 2 tabs given this AM.Droplet and fall precautions observed.

## 2018-03-02 NOTE — PROGRESS NOTES
[facility]ITAL[facility]  Progress Note     Keo Flatter Patient Status:  Inpatient    3/15/1935 MRN [id number]   [facility] 4NW-A Attending Carlos Diehl MD   Hosp Day # 1 PCP Nixon Padilla MD     Chief Complaint: sob fever confusion    S: Sodium (Porcine)  5,000 Units Subcutaneous [facility]   • cefTRIAXone  1 g Intravenous Q24H   • Oseltamivir Phosphate  75 mg Oral Daily       ASSESSMENT / PLAN:     1. Acute bronchitis and influenza illness started on Tamiflu and clinically better  2.  Hx of c

## 2018-03-02 NOTE — CM/SW NOTE
03/02/18 1100   CM/SW Referral Data   Referral Source Social Work (self-referral)   Reason for Referral Discharge planning   Informant Patient   Patient Info   Patient's Mental Status Alert;Oriented   Patient's Home Environment House   Patient lives wit

## 2018-03-02 NOTE — PROGRESS NOTES
[facility] Pharmacy Note:  Renal Adjustment for oseltamivir (TAMIFLU)    Sam Toro is a 81year old female who has been prescribed oseltamivir (TAMIFLU) 75 mg every 12 hrs.   CrCl is estimated creatinine clearance is 18.1 mL/min (based on SCr of 1.75 mg/dL (

## 2018-03-03 PROCEDURE — 99232 SBSQ HOSP IP/OBS MODERATE 35: CPT | Performed by: HOSPITALIST

## 2018-03-03 RX ORDER — CALCIUM CARBONATE 200(500)MG
500 TABLET,CHEWABLE ORAL
Status: DISCONTINUED | OUTPATIENT
Start: 2018-03-03 | End: 2018-03-04

## 2018-03-03 NOTE — PLAN OF CARE
Impaired Functional Mobility    • Achieve highest/safest level of mobility/gait Progressing        RESPIRATORY - ADULT    • Achieves optimal ventilation and oxygenation Progressing        Daughter in law at bedside. Denies loose stools at this time. Is 95 o

## 2018-03-03 NOTE — PROGRESS NOTES
[facility]  Progress Note     Lety Ramos Patient Status:  Inpatient    3/15/1935 MRN [id number]   [facility] 4NW-A Attending Arash Dykes MD   Hosp Day # 2 PCP Michiel Ormond, MD     Chief Complaint: sob fever chills    S: Wolm Joaois • Heparin Sodium (Porcine)  5,000 Units Subcutaneous [facility]   • cefTRIAXone  1 g Intravenous Q24H   • Oseltamivir Phosphate  75 mg Oral Daily       ASSESSMENT / PLAN:     1.  Acute bronchitis and influenza illness started on Tamiflu and clinically better

## 2018-03-03 NOTE — PLAN OF CARE
Problem: Patient/Family Goals  Goal: Patient/Family Long Term Goal  Patient's Long Term Goal: will return to home upon discharge.     Interventions:  - iv antibiotics  tamiflu  - See additional Care Plan goals for specific interventions   Outcome: Progressi 03/02/18    Goal: Electrolytes maintained within normal limits  INTERVENTIONS:  - Monitor labs and rhythm and assess patient for signs and symptoms of electrolyte imbalances  - Administer electrolyte replacement as ordered  - Monitor response to electrolyt

## 2018-03-04 VITALS
BODY MASS INDEX: 35.83 KG/M2 | SYSTOLIC BLOOD PRESSURE: 145 MMHG | RESPIRATION RATE: 18 BRPM | DIASTOLIC BLOOD PRESSURE: 54 MMHG | TEMPERATURE: 98 F | OXYGEN SATURATION: 93 % | HEIGHT: 60 IN | HEART RATE: 57 BPM | WEIGHT: 182.5 LBS

## 2018-03-04 PROCEDURE — 99239 HOSP IP/OBS DSCHRG MGMT >30: CPT | Performed by: HOSPITALIST

## 2018-03-04 RX ORDER — CEFDINIR 300 MG/1
300 CAPSULE ORAL 2 TIMES DAILY
Qty: 6 CAPSULE | Refills: 0 | Status: SHIPPED | OUTPATIENT
Start: 2018-03-04 | End: 2018-04-03 | Stop reason: ALTCHOICE

## 2018-03-04 RX ORDER — OSELTAMIVIR PHOSPHATE 75 MG/1
75 CAPSULE ORAL DAILY
Qty: 3 CAPSULE | Refills: 0 | Status: SHIPPED | OUTPATIENT
Start: 2018-03-04 | End: 2018-04-03 | Stop reason: ALTCHOICE

## 2018-03-04 NOTE — PLAN OF CARE
GASTROINTESTINAL - ADULT    • Maintains or returns to baseline bowel function Progressing        Impaired Functional Mobility    • Achieve highest/safest level of mobility/gait Progressing        RESPIRATORY - ADULT    • Achieves optimal ventilation and ox

## 2018-03-04 NOTE — PROGRESS NOTES
IV FLUIDS D/C'D, fatigued , up in chair and ambulates in hallway , RA saturation w/ activity consistant 90-92%, plans to go home today , discharge summary completed and discussed , discharged for home accompanied by adult children

## 2018-03-04 NOTE — DISCHARGE SUMMARY
[facility]ITAL[facility]  DISCHARGE SUMMARY     Aydin Jang Patient Status:  Inpatient    3/15/1935 MRN [id number]   [facility] 4NW-A Attending Lilibeth Salgado MD   [facility] Day # 3 PCP Garry Calvo MD     Date of Admission: 3/1/2018  Date of Berdie Conquest Tbec  Generic drug:  aspirin      Take 325 mg by mouth daily. Refills:  0     ezetimibe 10 MG Tabs  Commonly known as:  ZETIA      Take 1 tablet (10 mg total) by mouth daily.    Quantity:  90 tablet  Refills:  3     Levothyroxine Sodium 88 MCG Tabs  Commo

## 2018-03-06 ENCOUNTER — PATIENT OUTREACH (OUTPATIENT)
Dept: CASE MANAGEMENT | Age: 83
End: 2018-03-06

## 2018-03-06 DIAGNOSIS — Z02.9 ENCOUNTERS FOR ADMINISTRATIVE PURPOSE: ICD-10-CM

## 2018-03-06 NOTE — PROGRESS NOTES
Initial Post Discharge Follow Up   Discharge Date: 3/4/18  Contact Date: 3/6/2018    Consent Verification:  Assessment Completed With: Patient  HIPAA Verified? Yes    Discharge Dx:   Influenza A, acute bronchitis     General:   • How have you been since daily.   Disp:  Rfl:    acetaminophen (TYLENOL) 325 MG Oral Tab Take 325 mg by mouth every 6 (six) hours as needed for Pain. Disp:  Rfl:    Aspirin (ECOTRIN) 325 MG Oral Tab EC Take 325 mg by mouth daily.  Disp:  Rfl:    Calcium Carbonate-Vitamin D (OSCAL 5 MD Rico Alanis 26, [address] [facility]  ([address] [facility] [address])    Apr 20, 2018 11:20 AM CDT FOLLOW UP with Heide Natarajan MD [facility]ARDIOLOGY No at [facility] )    Oct 08, 2018  8:00 AM CDT Medicare Annual Well [place]

## 2018-03-08 ENCOUNTER — OFFICE VISIT (OUTPATIENT)
Dept: FAMILY MEDICINE CLINIC | Facility: CLINIC | Age: 83
End: 2018-03-08

## 2018-03-08 VITALS
OXYGEN SATURATION: 96 % | HEART RATE: 68 BPM | RESPIRATION RATE: 24 BRPM | TEMPERATURE: 98 F | SYSTOLIC BLOOD PRESSURE: 104 MMHG | DIASTOLIC BLOOD PRESSURE: 56 MMHG

## 2018-03-08 DIAGNOSIS — N17.9 ACUTE KIDNEY INJURY (HCC): ICD-10-CM

## 2018-03-08 DIAGNOSIS — E86.0 DEHYDRATION: ICD-10-CM

## 2018-03-08 DIAGNOSIS — R09.89 BILATERAL RALES: ICD-10-CM

## 2018-03-08 DIAGNOSIS — J10.1 INFLUENZA A: Primary | ICD-10-CM

## 2018-03-08 PROCEDURE — 1111F DSCHRG MED/CURRENT MED MERGE: CPT | Performed by: FAMILY MEDICINE

## 2018-03-08 PROCEDURE — 99495 TRANSJ CARE MGMT MOD F2F 14D: CPT | Performed by: FAMILY MEDICINE

## 2018-03-08 NOTE — PROGRESS NOTES
Patient presents with:  Hospital F/U: had flu and was in hospital feeling some what better     HPI:    Cayla Laguerre is a 81year old female here today for hospital follow up.    She was discharged from Inpatient hospital, [facility] to [address] [place] [address] cefdinir 300 MG Oral Cap Take 1 capsule (300 mg total) by mouth 2 (two) times daily.    Levothyroxine Sodium 88 MCG Oral Tab Take 88 mcg by mouth before breakfast.   Pantoprazole Sodium 40 MG Oral Tab EC Take 40 mg by mouth every morning before breakfast. She  reports that she quit smoking about 23 years ago. She has never used smokeless tobacco. She reports that she drinks alcohol. She reports that she does not use drugs. ROS:   GENERAL: feeling better, still tired.    SKIN: denies any unusual skin lesi CONCLUSION:  Overall stable appearance of the chest.  No acute abnormality is seen. If clinical symptoms persist consider followup radiographs or CT.     Component      Latest Ref Rng & Units 3/2/2018 3/1/2018   WBC      4.0 - 13.0 x10(3) uL 6.7 10.0   RBC 22.0 - 32.0 mmol/L 22.0 23.0      Ref Range & Units 3/1/18  5:03 PM   Adenovirus PCR: Negative  Negative    Coronavirus 229E PCR: Negative  Negative    Coronavirus Hku1 PCR: Negative  Negative    Coronavirus Nl63 PCR: Negative  Negative    Coronavirus Medical Decision Making- Based on service period of discharge to 30 days:   · Number of Possible Diagnoses and/or Management Options: moderate  · Amount and/or Complexity of Data to Be Reviewed: moderate  · Risk of Significant Complications, Morbidity, and

## 2018-03-26 ENCOUNTER — HOSPITAL ENCOUNTER (OUTPATIENT)
Dept: MAMMOGRAPHY | Age: 83
Discharge: HOME | End: 2018-03-26
Attending: FAMILY MEDICINE
Payer: MEDICARE

## 2018-03-26 ENCOUNTER — HOSPITAL ENCOUNTER (OUTPATIENT)
Dept: ULTRASOUND IMAGING | Age: 83
Discharge: HOME | End: 2018-03-26
Attending: FAMILY MEDICINE
Payer: MEDICARE

## 2018-03-26 DIAGNOSIS — R92.2 INCONCLUSIVE MAMMOGRAM: ICD-10-CM

## 2018-03-26 PROCEDURE — 77061 BREAST TOMOSYNTHESIS UNI: CPT | Performed by: FAMILY MEDICINE

## 2018-03-26 PROCEDURE — 77065 DX MAMMO INCL CAD UNI: CPT | Performed by: FAMILY MEDICINE

## 2018-03-26 PROCEDURE — 76642 ULTRASOUND BREAST LIMITED: CPT | Performed by: FAMILY MEDICINE

## 2018-04-03 ENCOUNTER — OFFICE VISIT (OUTPATIENT)
Dept: FAMILY MEDICINE CLINIC | Facility: CLINIC | Age: 83
End: 2018-04-03

## 2018-04-03 ENCOUNTER — APPOINTMENT (OUTPATIENT)
Dept: LAB | Age: 83
End: 2018-04-03
Attending: FAMILY MEDICINE
Payer: MEDICARE

## 2018-04-03 VITALS
BODY MASS INDEX: 34.86 KG/M2 | SYSTOLIC BLOOD PRESSURE: 124 MMHG | RESPIRATION RATE: 12 BRPM | TEMPERATURE: 98 F | HEIGHT: 61 IN | WEIGHT: 184.63 LBS | HEART RATE: 76 BPM | DIASTOLIC BLOOD PRESSURE: 78 MMHG

## 2018-04-03 DIAGNOSIS — K59.00 CONSTIPATION, UNSPECIFIED CONSTIPATION TYPE: Primary | ICD-10-CM

## 2018-04-03 DIAGNOSIS — E11.3293 MILD NONPROLIFERATIVE DIABETIC RETINOPATHY OF BOTH EYES WITHOUT MACULAR EDEMA ASSOCIATED WITH TYPE 2 DIABETES MELLITUS (HCC): ICD-10-CM

## 2018-04-03 DIAGNOSIS — E11.22 TYPE 2 DIABETES MELLITUS WITH STAGE 3 CHRONIC KIDNEY DISEASE, WITHOUT LONG-TERM CURRENT USE OF INSULIN (HCC): ICD-10-CM

## 2018-04-03 DIAGNOSIS — R80.9 MICROALBUMINURIA DUE TO TYPE 2 DIABETES MELLITUS (HCC): ICD-10-CM

## 2018-04-03 DIAGNOSIS — N18.30 TYPE 2 DIABETES MELLITUS WITH STAGE 3 CHRONIC KIDNEY DISEASE, WITHOUT LONG-TERM CURRENT USE OF INSULIN (HCC): ICD-10-CM

## 2018-04-03 DIAGNOSIS — E11.29 MICROALBUMINURIA DUE TO TYPE 2 DIABETES MELLITUS (HCC): ICD-10-CM

## 2018-04-03 DIAGNOSIS — I10 HYPERTENSION, BENIGN: ICD-10-CM

## 2018-04-03 PROBLEM — E87.6 HYPOKALEMIA: Status: RESOLVED | Noted: 2018-03-01 | Resolved: 2018-04-03

## 2018-04-03 PROBLEM — R41.0 DISORIENTATION: Status: RESOLVED | Noted: 2018-03-01 | Resolved: 2018-04-03

## 2018-04-03 PROBLEM — E78.00 HYPERCHOLESTEROLEMIA: Status: RESOLVED | Noted: 2017-10-19 | Resolved: 2018-04-03

## 2018-04-03 PROBLEM — I95.9 HYPOTENSION: Status: RESOLVED | Noted: 2018-03-01 | Resolved: 2018-04-03

## 2018-04-03 PROCEDURE — 83036 HEMOGLOBIN GLYCOSYLATED A1C: CPT | Performed by: FAMILY MEDICINE

## 2018-04-03 PROCEDURE — 82570 ASSAY OF URINE CREATININE: CPT

## 2018-04-03 PROCEDURE — 82043 UR ALBUMIN QUANTITATIVE: CPT

## 2018-04-03 PROCEDURE — 99214 OFFICE O/P EST MOD 30 MIN: CPT | Performed by: FAMILY MEDICINE

## 2018-04-03 RX ORDER — ASPIRIN 81 MG
100 TABLET, DELAYED RELEASE (ENTERIC COATED) ORAL 2 TIMES DAILY
Qty: 60 TABLET | Refills: 5 | Status: SHIPPED | OUTPATIENT
Start: 2018-04-03 | End: 2018-09-24

## 2018-04-03 NOTE — PROGRESS NOTES
Patient presents with:  Blood Pressure: f/u      HPI:   Charma Crigler is a 81year old female with PMH CAD, DM2, HLD, HTN, hypothyroidism who presents to the office for check up. Constipation - starting to be a regular problem.   Using dulcolax right n tongue normal; teeth and gums normal  Neck: no adenopathy, no JVD, supple, symmetrical, trachea midline and thyroid not enlarged, symmetric, no tenderness/mass/nodules  Lungs: clear to auscultation bilaterally  Heart: S1, S2 normal, no murmur, click, rub o

## 2018-05-16 ENCOUNTER — OFFICE VISIT (OUTPATIENT)
Dept: NUTRITION | Facility: HOSPITAL | Age: 83
End: 2018-05-16
Attending: INTERNAL MEDICINE
Payer: MEDICARE

## 2018-05-16 PROBLEM — I25.10 CAD (CORONARY ARTERY DISEASE): Status: ACTIVE | Noted: 2017-04-17

## 2018-05-16 PROCEDURE — 97802 MEDICAL NUTRITION INDIV IN: CPT

## 2018-05-16 NOTE — PROGRESS NOTES
ADULT INITIAL OUTPATIENT NUTRITION CONSULTATION    Nutrition Assessment    Medical Diagnosis: hyperlipidemia, CAD, HTN    PMH: Type II DM    Client Hx: 81year old female    Meds: noted    Labs (4/3/18):  HgB A1C: 6.4%    ANTHROPOMETRICS:  Ht: 5'1\"  Wt: planning meals/snacks and tracking intake to limit sodium and be mindful of carb intake. Encouraged whole grains, low fat dairy, lean meats, and more f&v. Written materials were issued and explained, all questions answered at this time.  Pt has set goals to

## 2018-09-09 DIAGNOSIS — E78.2 MIXED HYPERLIPIDEMIA: ICD-10-CM

## 2018-09-11 RX ORDER — ROSUVASTATIN CALCIUM 40 MG/1
TABLET, COATED ORAL
Qty: 90 TABLET | Refills: 0 | Status: SHIPPED | OUTPATIENT
Start: 2018-09-11 | End: 2018-12-10

## 2018-09-14 ENCOUNTER — HOSPITAL ENCOUNTER (EMERGENCY)
Age: 83
Discharge: HOME | End: 2018-09-14
Payer: MEDICARE

## 2018-09-14 ENCOUNTER — APPOINTMENT (OUTPATIENT)
Dept: GENERAL RADIOLOGY | Age: 83
End: 2018-09-14
Payer: MEDICARE

## 2018-09-14 VITALS
HEART RATE: 72 BPM | TEMPERATURE: 98 F | SYSTOLIC BLOOD PRESSURE: 127 MMHG | RESPIRATION RATE: 16 BRPM | WEIGHT: 179 LBS | OXYGEN SATURATION: 97 % | DIASTOLIC BLOOD PRESSURE: 74 MMHG | HEIGHT: 61 IN | BODY MASS INDEX: 33.79 KG/M2

## 2018-09-14 DIAGNOSIS — S83.91XA SPRAIN OF RIGHT KNEE, UNSPECIFIED LIGAMENT, INITIAL ENCOUNTER: Primary | ICD-10-CM

## 2018-09-14 DIAGNOSIS — S80.01XA CONTUSION OF RIGHT KNEE, INITIAL ENCOUNTER: ICD-10-CM

## 2018-09-14 PROCEDURE — 99283 EMERGENCY DEPT VISIT LOW MDM: CPT

## 2018-09-14 PROCEDURE — 73560 X-RAY EXAM OF KNEE 1 OR 2: CPT

## 2018-09-14 RX ORDER — ACETAMINOPHEN 500 MG
1000 TABLET ORAL ONCE
Status: COMPLETED | OUTPATIENT
Start: 2018-09-14 | End: 2018-09-14

## 2018-09-14 NOTE — ED PROVIDER NOTES
Patient Seen in: [facility] In [place]    History   Patient presents with:  Lower Extremity Injury (musculoskeletal)    Stated Complaint: rigth knee pain-fell yesterday     21-year-old  female with a history of diabetes prese Alcohol use: Yes      Alcohol/week: 0.0 - 0.6 oz      Comment: 1 per month    Drug use: No      Review of Systems    Positive for stated complaint: rigth knee pain-fell yesterday   Other systems are as noted in HPI.   Constitutional and vital signs review Contusion of right knee, initial encounter    Disposition:  Discharge  9/14/2018 12:03 pm    Follow-up:  Uvaldo Duncan MD  [address]  [address] [place] [address] [phone]    Schedule an appointment as soon as possible for a visit in 1 day

## 2018-09-24 DIAGNOSIS — K59.00 CONSTIPATION, UNSPECIFIED CONSTIPATION TYPE: ICD-10-CM

## 2018-09-25 ENCOUNTER — OFFICE VISIT (OUTPATIENT)
Dept: PHYSICAL THERAPY | Age: 83
End: 2018-09-25
Attending: ORTHOPAEDIC SURGERY
Payer: MEDICARE

## 2018-09-25 DIAGNOSIS — S83.91XA SPRAIN OF RIGHT KNEE, UNSPECIFIED LIGAMENT, INITIAL ENCOUNTER: ICD-10-CM

## 2018-09-25 DIAGNOSIS — S80.01XA CONTUSION OF RIGHT KNEE, INITIAL ENCOUNTER: ICD-10-CM

## 2018-09-25 PROCEDURE — 97110 THERAPEUTIC EXERCISES: CPT

## 2018-09-25 PROCEDURE — 97161 PT EVAL LOW COMPLEX 20 MIN: CPT

## 2018-09-25 NOTE — PROGRESS NOTES
Progress Notes         LOWER EXTREMITY EVALUATION:   Referring Physician: Dr. Georgia Shepherd  Diagnosis: R knee pain  Date of Service: 9/25/2018      PATIENT SUMMARY   Emma Lanza is a 81year old y/o female who presents to therapy today with complaints of  R kn restricted with M/L and A/P      Flexibility:  Hip Flexor: R mod, L mod  Hamstrings: R mod; L mod  Piriformis: R mod; L mod  Quads: R mod; L mod  Gastroc-soleus: R mod; L mod     Strength/MMT:   Hip Knee Foot/Ankle   Flexion: R 3/5; L 3/5    Flexion: R 4/5 total of 10 visits over a 90 day period. Treatment will include: Manual Therapy; Therapeutic Exercises; Neuromuscular Re-education;  Therapeutic Activity; Gait Training; Electrical Stim; Pt education; Home exercise program instructions;      Education or tr

## 2018-09-25 NOTE — TELEPHONE ENCOUNTER
Refill request sent from pharmacy for Colace. LOV 04/03/18. Will you approve refill ? Routed to Dr Raimundo Mccollum.

## 2018-09-25 NOTE — PROGRESS NOTES
LOWER EXTREMITY EVALUATION:   Referring Physician: Dr. Stovall Floor  Diagnosis: R knee pain  Date of Service: 9/25/2018     PATIENT Alice Carey is a 81year old y/o female who presents to therapy today with complaints of  R knee pain following a fal Flexibility:  Hip Flexor: R mod, L mod  Hamstrings: R mod; L mod  Piriformis: R mod; L mod  Quads: R mod; L mod  Gastroc-soleus: R mod; L mod    Strength/MMT:   Hip Knee Foot/Ankle   Flexion: R 3/5; L 3/5   Flexion: R 4/5; L 4/5  Extension: R 4/5; L 4/ Treatment will include: Manual Therapy; Therapeutic Exercises; Neuromuscular Re-education;  Therapeutic Activity; Gait Training; Electrical Stim; Pt education; Home exercise program instructions;     Education or treatment limitation: None  Rehab Potential:

## 2018-09-26 RX ORDER — DOCUSATE SODIUM 100 MG/1
CAPSULE, LIQUID FILLED ORAL
Qty: 60 CAPSULE | Refills: 3 | Status: SHIPPED | OUTPATIENT
Start: 2018-09-26 | End: 2019-02-08

## 2018-09-28 ENCOUNTER — OFFICE VISIT (OUTPATIENT)
Dept: PHYSICAL THERAPY | Age: 83
End: 2018-09-28
Attending: ORTHOPAEDIC SURGERY
Payer: MEDICARE

## 2018-09-28 PROCEDURE — 97140 MANUAL THERAPY 1/> REGIONS: CPT

## 2018-09-28 PROCEDURE — 97110 THERAPEUTIC EXERCISES: CPT

## 2018-09-28 NOTE — PROGRESS NOTES
Dx:  R knee pain following fall        Authorized # of Visits:  10         Next MD visit: none scheduled  Fall Risk: standard         Precautions: n/a             Subjective:  I did a lot of bending and picking up logs, soreness following.  Can sleep at nig YTB flex and ext abd 10 x          Rocker board 10 x 2 with HHA          YTB 10 x SKTC R  SLR 10 x   SLR with ER 5 x 2   Quad sets 10 x 5 secs each                            Skilled Services: skilled exercises and manual PT today     Charges: EX

## 2018-10-02 ENCOUNTER — OFFICE VISIT (OUTPATIENT)
Dept: PHYSICAL THERAPY | Age: 83
End: 2018-10-02
Attending: ORTHOPAEDIC SURGERY
Payer: MEDICARE

## 2018-10-02 PROCEDURE — 97110 THERAPEUTIC EXERCISES: CPT

## 2018-10-02 PROCEDURE — 97140 MANUAL THERAPY 1/> REGIONS: CPT

## 2018-10-02 NOTE — PROGRESS NOTES
Dx:  R knee pain following fall        Authorized # of Visits:  10         Next MD visit: none scheduled  Fall Risk: standard         Precautions: n/a             Subjective:  I feel about a 4/10 in the R knee.  My back is sore when I try to do certain thin TX#: 6/ Date:               TX#: 7/ Date:               TX#: 8/ 1/2 revs on recumbent bike today 5 mins   Last minute full revolution seat 10  Full revs at seat 10  5 mins  Patellar mobs on the R and PROM R knee 10 mins         PROM flex an

## 2018-10-03 ENCOUNTER — TELEPHONE (OUTPATIENT)
Dept: FAMILY MEDICINE CLINIC | Facility: CLINIC | Age: 83
End: 2018-10-03

## 2018-10-05 ENCOUNTER — OFFICE VISIT (OUTPATIENT)
Dept: PHYSICAL THERAPY | Age: 83
End: 2018-10-05
Attending: ORTHOPAEDIC SURGERY
Payer: MEDICARE

## 2018-10-05 PROCEDURE — 97140 MANUAL THERAPY 1/> REGIONS: CPT

## 2018-10-05 PROCEDURE — 97110 THERAPEUTIC EXERCISES: CPT

## 2018-10-05 NOTE — PROGRESS NOTES
Dx:  R knee pain following fall        Authorized # of Visits:  10         Next MD visit: none scheduled  Fall Risk: standard         Precautions: n/a             Subjective:  4/10. Feeling better.  Still having pain when I squat down and try to get up in t Date:               TX#: 6/ Date:               TX#: 7/ Date:               TX#: 8/ 1/2 revs on recumbent bike today 5 mins   Last minute full revolution seat 10  Full revs at seat 10  5 mins  Patellar mobs on the R and PROM R knee 10 mins  Clams no resi

## 2018-10-08 ENCOUNTER — OFFICE VISIT (OUTPATIENT)
Dept: FAMILY MEDICINE CLINIC | Facility: CLINIC | Age: 83
End: 2018-10-08
Payer: MEDICARE

## 2018-10-08 VITALS
TEMPERATURE: 98 F | RESPIRATION RATE: 16 BRPM | WEIGHT: 178 LBS | DIASTOLIC BLOOD PRESSURE: 60 MMHG | HEART RATE: 72 BPM | HEIGHT: 60 IN | SYSTOLIC BLOOD PRESSURE: 100 MMHG | BODY MASS INDEX: 34.95 KG/M2

## 2018-10-08 DIAGNOSIS — E11.22 TYPE 2 DIABETES MELLITUS WITH STAGE 3 CHRONIC KIDNEY DISEASE, WITHOUT LONG-TERM CURRENT USE OF INSULIN (HCC): ICD-10-CM

## 2018-10-08 DIAGNOSIS — I10 HYPERTENSION, BENIGN: ICD-10-CM

## 2018-10-08 DIAGNOSIS — Z23 NEED FOR VACCINATION: ICD-10-CM

## 2018-10-08 DIAGNOSIS — N18.30 TYPE 2 DIABETES MELLITUS WITH STAGE 3 CHRONIC KIDNEY DISEASE, WITHOUT LONG-TERM CURRENT USE OF INSULIN (HCC): ICD-10-CM

## 2018-10-08 DIAGNOSIS — E11.29 MICROALBUMINURIA DUE TO TYPE 2 DIABETES MELLITUS (HCC): ICD-10-CM

## 2018-10-08 DIAGNOSIS — E03.9 ACQUIRED HYPOTHYROIDISM: ICD-10-CM

## 2018-10-08 DIAGNOSIS — E78.2 MIXED HYPERLIPIDEMIA: ICD-10-CM

## 2018-10-08 DIAGNOSIS — Z13.31 DEPRESSION SCREENING: ICD-10-CM

## 2018-10-08 DIAGNOSIS — Z00.00 ENCOUNTER FOR ANNUAL HEALTH EXAMINATION: Primary | ICD-10-CM

## 2018-10-08 DIAGNOSIS — I25.10 CORONARY ARTERY DISEASE INVOLVING NATIVE CORONARY ARTERY OF NATIVE HEART WITHOUT ANGINA PECTORIS: ICD-10-CM

## 2018-10-08 DIAGNOSIS — E11.3293 MILD NONPROLIFERATIVE DIABETIC RETINOPATHY OF BOTH EYES WITHOUT MACULAR EDEMA ASSOCIATED WITH TYPE 2 DIABETES MELLITUS (HCC): ICD-10-CM

## 2018-10-08 DIAGNOSIS — R80.9 MICROALBUMINURIA DUE TO TYPE 2 DIABETES MELLITUS (HCC): ICD-10-CM

## 2018-10-08 PROCEDURE — 90653 IIV ADJUVANT VACCINE IM: CPT | Performed by: FAMILY MEDICINE

## 2018-10-08 PROCEDURE — G0444 DEPRESSION SCREEN ANNUAL: HCPCS | Performed by: FAMILY MEDICINE

## 2018-10-08 PROCEDURE — G0439 PPPS, SUBSEQ VISIT: HCPCS | Performed by: FAMILY MEDICINE

## 2018-10-08 PROCEDURE — G0008 ADMIN INFLUENZA VIRUS VAC: HCPCS | Performed by: FAMILY MEDICINE

## 2018-10-08 NOTE — PROGRESS NOTES
HPI:   Keo Diggs is a 81year old female who presents for a Medicare Subsequent Annual Wellness visit (Pt already had Initial Annual Wellness). Preventative  Breast: needed diagnostic after inconclusive mammogram.  Normal in March.    Colon: n/a - status. She has Walking problems based on screening of functional status.    Difficulty walking?: Yes             Depression Screening (PHQ-2/PHQ-9): Over the LAST 2 WEEKS   Little interest or pleasure in doing things (over the l to type 2 diabetes mellitus (HCC)     Lower extremity edema    Wt Readings from Last 3 Encounters:  10/08/18 : 178 lb  09/14/18 : 179 lb  04/20/18 : 185 lb     Last Cholesterol Labs:   Lab Results   Component Value Date    CHOLEST 153 10/31/2017    HDL 57 reflux (6/29/2012), Hyperlipidemia (6/29/2012), Hypertension, benign, Hypothyroidism (6/29/2012), S/P CABG x 5, Type II  diabetes mellitus  (6/29/2012), and Unspecified vitamin D deficiency (6/29/2012).     She  has a past surgical history that includes col clear. PERRL, EOM's intact. Nose: Nares normal. Septum midline. Mucosa normal. No drainage or sinus tenderness.   Lungs: clear to auscultation bilaterally  Heart: S1, S2 normal, +murmur  Extremities: extremities normal, atraumatic, no cyanosis or edema  Pu getting them. 5. Hypertension, benign  BP controlled  Stable. - COMP METABOLIC PANEL (14); Future    6. Mixed hyperlipidemia  Check lipids annually. On Crestor.   - COMP METABOLIC PANEL (14); Future  - LIPID PANEL; Future    7.  Coronary artery disea Value   10/31/2017 78        EKG - w/ Initial Preventative Physical Exam only, or if medically necessary Electrocardiogram date04/20/2018       Colorectal Cancer Screening      Colonoscopy Screen every 10 years Colonoscopy due on 08/24/2020 Update Health M injectable drug abusers     Tetanus Toxoid  Only covered with a cut with metal- TD and TDaP Not covered by Medicare Part B 10/01/1997 This may be covered with your prescription benefits, but Medicare does not cover unless Medically needed    Zoster  Not co

## 2018-10-08 NOTE — PATIENT INSTRUCTIONS
[address] SCREENING SCHEDULE   Tests on this list are recommended by your physician but may not be covered, or covered at this frequency, by your insurer. Please check with your insurance carrier before scheduling to verify coverage.    PREVENTATI criteria:   • Men who are 73-68 years old and have smoked more than 100 cigarettes in their lifetime   • Anyone with a family history    Colorectal Cancer Screening  Covered up to Age 76     Colonoscopy Screen   Covered every 10 years- more often if abnorm Mammogram regularly   Immunizations      Influenza  Covered Annually Orders placed or performed in visit on 09/27/16   • FLU VAC NO PRSV 4 SHERMAN 3 YRS+   Orders placed or performed in visit on 10/07/15   • FLU VAC NO PRSV 4 SHERMAN 3 YRS+   Orders placed or perf definitions of the different types of Advance Directives. It also has the State forms available on it's website for anyone to review and print using their home computer and printer. (the forms are also available in [address])  [email or web address]. org  This li

## 2018-10-09 ENCOUNTER — OFFICE VISIT (OUTPATIENT)
Dept: PHYSICAL THERAPY | Age: 83
End: 2018-10-09
Attending: ORTHOPAEDIC SURGERY
Payer: MEDICARE

## 2018-10-09 PROCEDURE — 97140 MANUAL THERAPY 1/> REGIONS: CPT

## 2018-10-09 PROCEDURE — 97110 THERAPEUTIC EXERCISES: CPT

## 2018-10-09 NOTE — PROGRESS NOTES
Dx:  R knee pain following fall        Authorized # of Visits:  10         Next MD visit: none scheduled  Fall Risk: standard         Precautions: n/a             Subjective:  R knee feeling better. 3/10.  I feel like I can bend it easier   Objective: 10 mins  Clams no resistance   20 x  Bike seat moved to [employer] 9  Reformer 3 cords 3 x 10 B squats      PROM flex and ext 10 x each  PPT 10 x 5 sec holds Bike 5 mins full revs recumbent bike Rocker board A/P and M/L 10 x each       HS stretch 3 x 30 secs  4 i

## 2018-10-12 ENCOUNTER — OFFICE VISIT (OUTPATIENT)
Dept: PHYSICAL THERAPY | Age: 83
End: 2018-10-12
Attending: ORTHOPAEDIC SURGERY
Payer: MEDICARE

## 2018-10-12 PROCEDURE — 97110 THERAPEUTIC EXERCISES: CPT

## 2018-10-12 PROCEDURE — 97140 MANUAL THERAPY 1/> REGIONS: CPT

## 2018-10-12 NOTE — PROGRESS NOTES
Dx:  R knee pain following fall        Authorized # of Visits:  10         Next MD visit: none scheduled  Fall Risk: standard         Precautions: n/a             Subjective:     Feeling better in the R knee, nervous about kneeling down on the R knee.  Walk Date:               TX#: 3/10  10/2/18   Date:               TX#: 4/10  10/5/18 Date:               TX#: 5/10  10/8/18 Date:               TX#: 6/10  10/12/18 Date:               TX#: 7/ Date:               TX#: 8/ 1/2 revs on recumbent bike today 5 mins tendon with KT 8 mins                          Skilled Services: skilled exercises and manual PT today     Charges: EX 2 MT 1        Total Timed Treatment: 45 min  Total Treatment Time: 45 min

## 2018-10-16 ENCOUNTER — OFFICE VISIT (OUTPATIENT)
Dept: PHYSICAL THERAPY | Age: 83
End: 2018-10-16
Attending: ORTHOPAEDIC SURGERY
Payer: MEDICARE

## 2018-10-16 PROCEDURE — 97110 THERAPEUTIC EXERCISES: CPT

## 2018-10-16 PROCEDURE — 97140 MANUAL THERAPY 1/> REGIONS: CPT

## 2018-10-16 NOTE — PROGRESS NOTES
Dx:  R knee pain following fall        Authorized # of Visits:  10         Next MD visit: none scheduled  Fall Risk: standard         Precautions: n/a             Subjective:   No pain with walking around.  Still a little pain with flexion of the knee on st the R and PROM R knee 10 mins  Clams no resistance   20 x  Bike seat moved to [facility] 9  Reformer 3 cords 3 x 10 B squats Bike seat 8   5 mins  nustep 6 mins Lv 1    Rocker board M/L   A/P  10 x     PROM flex and ext 10 x each  PPT 10 x 5 sec holds Bike 5 mins MT 1        Total Timed Treatment: 45 min  Total Treatment Time: 45 min

## 2018-10-18 ENCOUNTER — OFFICE VISIT (OUTPATIENT)
Dept: PHYSICAL THERAPY | Age: 83
End: 2018-10-18
Attending: ORTHOPAEDIC SURGERY
Payer: MEDICARE

## 2018-10-18 PROCEDURE — 97140 MANUAL THERAPY 1/> REGIONS: CPT

## 2018-10-18 PROCEDURE — 97110 THERAPEUTIC EXERCISES: CPT

## 2018-10-18 NOTE — PROGRESS NOTES
Dx:  R knee pain following fall        Authorized # of Visits:  10         Next MD visit: none scheduled  Fall Risk: standard         Precautions: n/a             Subjective:   My  is in the hospital since Tuesday with pneumonia.  I have not been abl 8/10  10/18/18   1/2 revs on recumbent bike today 5 mins   Last minute full revolution seat 10  Full revs at seat 10  5 mins  Patellar mobs on the R and PROM R knee 10 mins  Clams no resistance   20 x  Bike seat moved to [facility] 9  Reformer 3 cords 3 x 10 B sq ext and flex 10 x R Seated trunk flexion for low back 2 x 20 secs      YTB 10 x SKTC R  SLR 10 x   SLR with ER 5 x 2   Quad sets 10 x 5 secs each  Marching in bars 10 x each side    CP 10 mins R knee  Ball add 10 x 2  Seated trunk flexion 2 x 20 secs  STM

## 2018-10-22 DIAGNOSIS — I25.812 CORONARY ARTERY DISEASE INVOLVING BYPASS GRAFT OF TRANSPLANTED HEART, ANGINA PRESENCE UNSPECIFIED: ICD-10-CM

## 2018-10-22 DIAGNOSIS — I10 HYPERTENSION, BENIGN: ICD-10-CM

## 2018-10-22 DIAGNOSIS — K21.9 GASTROESOPHAGEAL REFLUX DISEASE, ESOPHAGITIS PRESENCE NOT SPECIFIED: ICD-10-CM

## 2018-10-22 DIAGNOSIS — E78.2 MIXED HYPERLIPIDEMIA: ICD-10-CM

## 2018-10-22 DIAGNOSIS — E03.9 ACQUIRED HYPOTHYROIDISM: ICD-10-CM

## 2018-10-23 RX ORDER — LEVOTHYROXINE SODIUM 88 UG/1
TABLET ORAL
Qty: 90 TABLET | Refills: 1 | Status: SHIPPED | OUTPATIENT
Start: 2018-10-23 | End: 2019-04-25

## 2018-10-23 RX ORDER — PANTOPRAZOLE SODIUM 40 MG/1
TABLET, DELAYED RELEASE ORAL
Qty: 90 TABLET | Refills: 1 | Status: SHIPPED | OUTPATIENT
Start: 2018-10-23 | End: 2019-04-25

## 2018-10-23 RX ORDER — EZETIMIBE 10 MG/1
TABLET ORAL
Qty: 90 TABLET | Refills: 1 | Status: SHIPPED | OUTPATIENT
Start: 2018-10-23 | End: 2019-04-25

## 2018-10-24 ENCOUNTER — OFFICE VISIT (OUTPATIENT)
Dept: PHYSICAL THERAPY | Age: 83
End: 2018-10-24
Attending: ORTHOPAEDIC SURGERY
Payer: MEDICARE

## 2018-10-24 PROCEDURE — 97140 MANUAL THERAPY 1/> REGIONS: CPT

## 2018-10-24 PROCEDURE — 97110 THERAPEUTIC EXERCISES: CPT

## 2018-10-24 NOTE — PROGRESS NOTES
Dx:  R knee pain following fall        Authorized # of Visits:  10         Next MD visit: none scheduled  Fall Risk: standard         Precautions: n/a             Subjective:    Feeling better each day.  Can up and down the stairs normally unless I am holdi mobs on the R and PROM R knee 10 mins  Clams no resistance   20 x  Bike seat moved to [employer] 9  Reformer 3 cords 3 x 10 B squats Bike seat 8   5 mins  nustep 6 mins Lv 1    Rocker board M/L   A/P  10 x  Bike full revs 5 mins     Rocker board 10 x A/P   M/L Rocker board 10 x 2 with HHA  Hold SLR due to low back pain  Gait training  PROM ext and flex 10 x R Seated trunk flexion for low back 2 x 20 secs       YTB 10 x SKTC R  SLR 10 x   SLR with ER 5 x 2   Quad sets 10 x 5 secs each  Marching in bars 10 x eac such as grass and gravel (10 visits)  · Pt will be independent and compliant with comprehensive HEP to maintain progress achieved in PT (10 visits)  · Increase hip flexion one grade to decrease shuffling of feet on the ground and tripping on rugs at home . Flex and ext 10 x each red band  KT R knee Balance   2-3 SL no HHA  Modified tandem 3 secs    YTB flex and ext abd 10 x  Lateral side steps 10 x  Red band   Rocker board A/P 10 x  Flex and ext 10 x each no bands  March on the right 5x  In bars   LTR- 10

## 2018-10-26 ENCOUNTER — TELEPHONE (OUTPATIENT)
Dept: FAMILY MEDICINE CLINIC | Facility: CLINIC | Age: 83
End: 2018-10-26

## 2018-10-26 ENCOUNTER — OFFICE VISIT (OUTPATIENT)
Dept: PHYSICAL THERAPY | Age: 83
End: 2018-10-26
Attending: ORTHOPAEDIC SURGERY
Payer: MEDICARE

## 2018-10-26 DIAGNOSIS — M54.50 BILATERAL LOW BACK PAIN WITHOUT SCIATICA, UNSPECIFIED CHRONICITY: Primary | ICD-10-CM

## 2018-10-26 PROCEDURE — 97110 THERAPEUTIC EXERCISES: CPT

## 2018-10-26 NOTE — TELEPHONE ENCOUNTER
From PT:  I have been seeing Ryan Simon for her knee pain following a fall and she is ending her therapy with me this Friday ( doing great). She has mentioned back pain and her there throughout her visits.  I have been treating a little here and there, but sierra

## 2018-10-26 NOTE — TELEPHONE ENCOUNTER
Pt is calling in she said that her PT, Sabrina Cordon,  called asking Dr. Lesa Watkins to add back therapy to her PT orders for her knee. Can Dr. Lesa Watkins add this, her phone number is [phone].

## 2018-10-26 NOTE — PROGRESS NOTES
Dx:  R knee pain following fall        Authorized # of Visits:  10         Next MD visit: none scheduled  Fall Risk: standard         Precautions: n/a             Subjective:    Feeling better each day. 0/10 knee pain today.    Objective:      degs AROM  1 mins Lv 1    Rocker board M/L   A/P  10 x  Bike full revs 5 mins     Rocker board 10 x A/P   M/L     Bike full revs 5 mins     LTR 10 x  Flex/ext abd red band lateral side bending 10 x  Bike full revs 5 mins     Rocker board  A/P   M/L  20 x    PROM flex a spine mult bouts grade 2-3 central  STM R arch with passive stretches  Taped R arch with Sidney zelaya tape     Tennis ball rolling and gastroc stretch with towel 3 x 30 secs in long sitting       Rocker board 10 x 2 with HHA  Hold SLR due to low back pain  G demonstrate increased hip ER/ABD strength to 5/5 to perform stepping and squatting activities without excessive femoral IR/ADD (10 visits)  · Pt will improve SLS to 15s to improve safety with gait on uneven surfaces such as grass and gravel (10 visits)  · Step ups on 4 inch 10 x  STM post knee 5 mins   HS stretch 2 x 30 secs   Lateral side stepping no bands   Lateral side walk with red band in bars    Hip ext and flex with red band 10 x each in bars Side step red band   Flex and ext 10 x each red band  KT

## 2018-10-29 NOTE — PROGRESS NOTES
Discharge Summary    Pt has attended 10 , cancelled 0 , and no shown  0  visits in physical therapy. Subjective:    Feeling better each day. 0/10 knee pain today.    Objective:      degs AROM  125   R knee flexion     Assessment:    Pt has met her goals mins     Rocker board 10 x A/P   M/L     Bike full revs 5 mins     LTR 10 x  Flex/ext abd red band lateral side bending 10 x  Bike full revs 5 mins     Rocker board  A/P   M/L  20 x    PROM flex and ext 10 x each  PPT 10 x 5 sec holds Bike 5 mins full revs stretches  Taped R arch with Sidney zelaya tape     Tennis ball rolling and gastroc stretch with towel 3 x 30 secs in long sitting       Rocker board 10 x 2 with HHA  Hold SLR due to low back pain  Gait training  PROM ext and flex 10 x R Seated trunk flexion stepping and squatting activities without excessive femoral IR/ADD (10 visits)      G 6699 CJ   G 3049 CJ     Patient/Family/Caregiver was advised of these findings, precautions, and treatment options and has agreed to actively participate in planning and

## 2018-10-30 ENCOUNTER — OFFICE VISIT (OUTPATIENT)
Dept: PHYSICAL THERAPY | Age: 83
End: 2018-10-30
Attending: ORTHOPAEDIC SURGERY
Payer: MEDICARE

## 2018-10-30 DIAGNOSIS — M54.50 BILATERAL LOW BACK PAIN WITHOUT SCIATICA, UNSPECIFIED CHRONICITY: ICD-10-CM

## 2018-10-30 PROCEDURE — 97014 ELECTRIC STIMULATION THERAPY: CPT

## 2018-10-30 PROCEDURE — 97161 PT EVAL LOW COMPLEX 20 MIN: CPT

## 2018-10-30 PROCEDURE — 97110 THERAPEUTIC EXERCISES: CPT

## 2018-10-30 NOTE — PROGRESS NOTES
SPINE EVALUATION:   Referring Physician: Dr. Lisa Pérez  Diagnosis:  Chronic low back pain Date of Service: 10/30/2018     PATIENT Dollie Lesches is a 81year old y/o female who presents to therapy today with complaints of  Low back pain followin help with stiffness. Attempted traction- long axis- did help temporarily.   Stiff following visit today  Patient was instructed in and issued a HEP for  HS, SKTC and LTR in supine  Charges: PT Eval Low Complexity, EX 1       Total Timed Treatment: 45 min treatment options and has agreed to actively participate in planning and for this course of care. Thank you for your referral. Please co-sign or sign and return this letter via fax as soon as possible to [phone].  If you have any questions, please c

## 2018-11-08 ENCOUNTER — OFFICE VISIT (OUTPATIENT)
Dept: PHYSICAL THERAPY | Age: 83
End: 2018-11-08
Attending: ORTHOPAEDIC SURGERY
Payer: MEDICARE

## 2018-11-08 PROCEDURE — 97110 THERAPEUTIC EXERCISES: CPT

## 2018-11-08 PROCEDURE — 97140 MANUAL THERAPY 1/> REGIONS: CPT

## 2018-11-08 NOTE — PROGRESS NOTES
Dx:  Low back pain       Authorized # of Visits:  10         Next MD visit: none scheduled  Fall Risk: standard         Precautions: n/a             Subjective:  I think I am feeling better in my low back.  R knee still hurts when I try to kneel on it on my Nustep 5 mins         Rocker board A/P 10 x SL only with HHA 10 x  Rocker board B and SL 10 x each   Lateral side stepping with red band   Flex and ext red band 10 x with HHA        Lateral side walking  Green  band   Flexion walking in bars HHA green ban

## 2018-11-13 ENCOUNTER — OFFICE VISIT (OUTPATIENT)
Dept: PHYSICAL THERAPY | Age: 83
End: 2018-11-13
Attending: ORTHOPAEDIC SURGERY
Payer: MEDICARE

## 2018-11-13 PROCEDURE — 97110 THERAPEUTIC EXERCISES: CPT

## 2018-11-13 NOTE — PROGRESS NOTES
Dx:  Low back pain       Authorized # of Visits:  10         Next MD visit: none scheduled  Fall Risk: standard         Precautions: n/a             Subjective:   Feeling stiff in my low back today. Sat at [employer] today and reading a class.  R knee is feeling 10 x SL only with HHA 10 x  Rocker board B and SL 10 x each   Lateral side stepping with red band   Flex and ext red band 10 x with HHA Rocker board 10 x B   SL 10 x on ly HHA   M/L 10 x on rocker board    4 inch 10 x each side with HHA        Lateral side

## 2018-11-16 ENCOUNTER — OFFICE VISIT (OUTPATIENT)
Dept: PHYSICAL THERAPY | Age: 83
End: 2018-11-16
Attending: ORTHOPAEDIC SURGERY
Payer: MEDICARE

## 2018-11-16 PROCEDURE — 97140 MANUAL THERAPY 1/> REGIONS: CPT

## 2018-11-16 PROCEDURE — 97110 THERAPEUTIC EXERCISES: CPT

## 2018-11-16 NOTE — PROGRESS NOTES
Dx:  Low back pain       Authorized # of Visits:  10         Next MD visit: none scheduled  Fall Risk: standard         Precautions: n/a             Subjective:    I think some times I am just use to my back pain. Pain in the center of my back.  3/10 in the TX#: 4/10  11/13/18 Date:               TX#: 5/10  11/16/18 Date:               TX#: 6/ Date:               TX#: 7/ Date:               TX#: 8/   nustep 5 mins  Nustep 5 mins  nustep 6 mins  nustep 6 mins       Rocker board A/P 10 x SL only with HHA 10 x

## 2018-11-20 ENCOUNTER — OFFICE VISIT (OUTPATIENT)
Dept: PHYSICAL THERAPY | Age: 83
End: 2018-11-20
Attending: ORTHOPAEDIC SURGERY
Payer: MEDICARE

## 2018-11-20 PROCEDURE — 97110 THERAPEUTIC EXERCISES: CPT

## 2018-11-20 PROCEDURE — 97140 MANUAL THERAPY 1/> REGIONS: CPT

## 2018-11-20 NOTE — PROGRESS NOTES
Dx:  Low back pain       Authorized # of Visits:  10         Next MD visit: none scheduled  Fall Risk: standard         Precautions: n/a             Subjective:   My back was a little sore after last appt.   My knee is ok, the first few steps  My knee is so 11/8/18          TX#: 3/10   Date:               TX#: 4/10  11/13/18 Date:               TX#: 5/10  11/16/18 Date:               TX#: 6/10  11/20/18 Date:               TX#: 7/ Date:               TX#: 8/   nustep 5 mins  Nustep 5 mins  nustep 6 mins  nust secs  Seated trunk flexion 2 x 30 secs  Seated trunk flexion 3 x20 sec central and side stretches  Seated trunk flexion 3 x 20 secs                                                   Skilled Services:  Manual PT and skilled exercises issued to pt    Charges

## 2018-11-27 ENCOUNTER — OFFICE VISIT (OUTPATIENT)
Dept: PHYSICAL THERAPY | Age: 83
End: 2018-11-27
Attending: ORTHOPAEDIC SURGERY
Payer: MEDICARE

## 2018-11-27 PROCEDURE — 97110 THERAPEUTIC EXERCISES: CPT

## 2018-11-27 PROCEDURE — 97140 MANUAL THERAPY 1/> REGIONS: CPT

## 2018-11-27 NOTE — PROGRESS NOTES
Dx:  Low back pain       Authorized # of Visits:  10         Next MD visit: none scheduled  Fall Risk: standard         Precautions: n/a             Subjective:     The back is the same with sitting, still stiff. , my knee is still feeling soreness when I m progress achieved in PT (10 visits)    Plan: cont with PT for core and lumbar strengthening  Date: 11/8/2018 TX#: 2/10 Date:     11/8/18          TX#: 3/10   Date:               TX#: 4/10  11/13/18 Date:               TX#: 5/10  11/16/18 Date: over 4 inch with HHA 10 x   HS stretch and Knee flexion 30 secs each   Clams green band 10 x   PA glides lumbar spine uni with pt in side lying grade 2-3 10 mins  Roller ITB   Bridge 10 x    M/L 10 on rocker board no HHA     Green band 10 x rows with tande

## 2018-11-29 ENCOUNTER — OFFICE VISIT (OUTPATIENT)
Dept: PHYSICAL THERAPY | Age: 83
End: 2018-11-29
Attending: ORTHOPAEDIC SURGERY
Payer: MEDICARE

## 2018-11-29 PROCEDURE — 97140 MANUAL THERAPY 1/> REGIONS: CPT

## 2018-11-29 PROCEDURE — 97110 THERAPEUTIC EXERCISES: CPT

## 2018-11-29 NOTE — PROGRESS NOTES
Dx:  Low back pain       Authorized # of Visits:  10         Next MD visit: none scheduled  Fall Risk: standard         Precautions: n/a             Subjective:    Lumbar pain 0/10 currently. 0/10 right knee today.  Was sore after last appt, better today mins  Nustep 5 mins  nustep 6 mins  nustep 6 mins  nustep 5 mins  nustep 5 mins   LE only nustep 5 mins      Rocker board A/P 10 x SL only with HHA 10 x  Rocker board B and SL 10 x each   Lateral side stepping with red band   Flex and ext red band 10 x wit lumbar spine uni with pt in side lying grade 2-3 10 mins  Roller ITB   Bridge 10 x    M/L 10 on rocker board no HHA     Green band 10 x rows with tandem stance     Rocker board balance 5 secs x 2       Standing green band hip ext and flex with HHA 10 x wit isometric contraction without requiring verbal or tactile cuing to promote advancement of therex ( 10 visits)  · Pt will demonstrate good understanding of proper posture and body mechanics to decrease pain and improve spinal safety (10 visits)  · Pt will i holds     March in supine with green band 10 x with PF contractions Prone: ZULEIKA bravo grade 2-3 mult bouts uni and central    STM lumbar paraspinals   15 mins  LTR and SKTC 10 x each 10 sec holds     Marching in supine green alternating 10 x each side  Pass

## 2018-12-05 ENCOUNTER — OFFICE VISIT (OUTPATIENT)
Dept: PHYSICAL THERAPY | Age: 83
End: 2018-12-05
Attending: ORTHOPAEDIC SURGERY
Payer: MEDICARE

## 2018-12-05 PROCEDURE — 97110 THERAPEUTIC EXERCISES: CPT

## 2018-12-05 PROCEDURE — 97140 MANUAL THERAPY 1/> REGIONS: CPT

## 2018-12-05 NOTE — PROGRESS NOTES
Dx:  Low back pain       Authorized # of Visits:  10         Next MD visit: none scheduled  Fall Risk: standard         Precautions: n/a             Subjective: If I stand up straight 1/10. R knee 1/2 depends on activity.    Objective:    PA glides to l TX#: 6/10  11/20/18 Date:               TX#: 7/10  11/27/18 Date:               TX#: 8/10  11/29/18 9/10  12/5/18   nustep 5 mins  Nustep 5 mins  nustep 6 mins  nustep 6 mins  nustep 5 mins  nustep 5 mins   LE only nustep 5 mins    nustep 5 mins flexor stretch 2 x 20 secs   8 mins   Bridge 10 x seated lumbar stretch 2 x 30 secs  HS stretch 2 x 30 secs      Step up on 6 inch step 10 x each side with HHA     MT: PA glides to lumbar spine grade 2-3 central and uni mult bouts  10 mins   Hip flexor str medial knee with end range knee flexion   Lumbar ext to 70 degs with knees extended. Ext to neutral     Assessment:  R knee feels sore when pushing down on the nustep on the right. Has improved since initial fall.   Pt educated on changing positions and not ext red band 10 x with HHA Rocker board 10 x B   SL 10 x on ly HHA   M/L 10 x on rocker board    4 inch 10 x each side with HHA  SKTC 2 x 30 secs each   LTR 10 x   Bridges 10 x     STM: L low back  PA glides with pt in side lying grade 2-3 uni mult bouts L 3 x 20 secs                                                   Skilled Services:  Manual PT and skilled exercises issued to pt    Charges:  EX 2 MT 1     Total Timed Treatment:  45  min  Total Treatment Time:  45  min

## 2018-12-06 NOTE — PROGRESS NOTES
Discharge Summary    Pt has attended 9 , cancelled  0  and no shown  0  visits in physical therapy. Subjective: If I stand up straight 1/10. R knee 1/2 depends on activity.    Objective:    PA glides to lumbar spine moderate tightness grade 2-3 freya care.    Thank you for your referral. Please co-sign or sign and return this letter via fax as soon as possible to [phone]. If you have any questions, please contact me at Dept: [phone].     Sincerely,  Electronically signed by therapist: Bethany Andrade

## 2018-12-06 NOTE — PROGRESS NOTES
Discharge Summary    Pt has attended 9 , cancelled  0 , and no shown  0  visits in physical therapy. Subjective: If I stand up straight 1/10. R knee 1/2 depends on activity.    Objective:    PA glides to lumbar spine moderate tightness grade 2-3 freya of care. Thank you for your referral. Please co-sign or sign and return this letter via fax as soon as possible to [phone]. If you have any questions, please contact me at Dept: [phone].     Sincerely,  Electronically signed by therapist: Sadaf Louis

## 2018-12-10 DIAGNOSIS — E78.2 MIXED HYPERLIPIDEMIA: ICD-10-CM

## 2018-12-14 RX ORDER — ROSUVASTATIN CALCIUM 40 MG/1
TABLET, COATED ORAL
Qty: 90 TABLET | Refills: 0 | Status: SHIPPED | OUTPATIENT
Start: 2018-12-14 | End: 2019-03-13

## 2018-12-14 NOTE — TELEPHONE ENCOUNTER
Requested Prescriptions     Pending Prescriptions Disp Refills   • ROSUVASTATIN CALCIUM 40 MG Oral Tab [Pharmacy Med Name: ROSUVASTATIN 40MG TABLETS] 90 tablet 0     Sig: TAKE 1 TABLET(40 MG) BY MOUTH EVERY DAY       LOV 10/8/2018     Appointment scheduled

## 2019-01-07 ENCOUNTER — OFFICE VISIT (OUTPATIENT)
Dept: FAMILY MEDICINE CLINIC | Facility: CLINIC | Age: 84
End: 2019-01-07
Payer: MEDICARE

## 2019-01-07 VITALS
RESPIRATION RATE: 14 BRPM | SYSTOLIC BLOOD PRESSURE: 110 MMHG | WEIGHT: 177.19 LBS | DIASTOLIC BLOOD PRESSURE: 64 MMHG | TEMPERATURE: 98 F | HEART RATE: 76 BPM | BODY MASS INDEX: 34.79 KG/M2 | HEIGHT: 60 IN

## 2019-01-07 DIAGNOSIS — J06.9 ACUTE URI: Primary | ICD-10-CM

## 2019-01-07 PROCEDURE — 99213 OFFICE O/P EST LOW 20 MIN: CPT | Performed by: NURSE PRACTITIONER

## 2019-01-07 RX ORDER — AMOXICILLIN AND CLAVULANATE POTASSIUM 875; 125 MG/1; MG/1
1 TABLET, FILM COATED ORAL 2 TIMES DAILY
Qty: 20 TABLET | Refills: 0 | Status: SHIPPED | OUTPATIENT
Start: 2019-01-07 | End: 2019-01-17

## 2019-01-07 NOTE — PROGRESS NOTES
Patient presents with:  Throat Problem  Cough: x 3 days      HPI:  Presents with approx 3 day history of minimally sore throat, hoarse voice, cough with production of yellow colored sputum, nasal drainage, sinus congestion and fatigue.  Denies fever/chills, TABLET(40 MG) BY MOUTH DAILY Disp: 90 tablet Rfl: 1   METOPROLOL TARTRATE 25 MG Oral Tab TAKE 1 TABLET(25 MG) BY MOUTH TWICE DAILY Disp: 180 tablet Rfl: 1    MG Oral Cap TAKE ONE CAPSULE BY MOUTH TWICE DAILY Disp: 60 capsule Rfl: 3   Pantoprazole So of the defined types were placed in this encounter. Meds & Refills for this Visit:  Requested Prescriptions      No prescriptions requested or ordered in this encounter       Imaging & Consults:  None    No Follow-up on file.   There are no Patient Ins

## 2019-02-08 DIAGNOSIS — K59.00 CONSTIPATION, UNSPECIFIED CONSTIPATION TYPE: ICD-10-CM

## 2019-02-08 RX ORDER — DOCUSATE SODIUM 100 MG/1
CAPSULE, LIQUID FILLED ORAL
Qty: 60 CAPSULE | Refills: 5 | Status: SHIPPED | OUTPATIENT
Start: 2019-02-08 | End: 2019-04-08

## 2019-03-13 DIAGNOSIS — E78.2 MIXED HYPERLIPIDEMIA: ICD-10-CM

## 2019-03-15 RX ORDER — ROSUVASTATIN CALCIUM 40 MG/1
TABLET, COATED ORAL
Qty: 90 TABLET | Refills: 3 | Status: SHIPPED | OUTPATIENT
Start: 2019-03-15 | End: 2019-10-01

## 2019-03-15 NOTE — TELEPHONE ENCOUNTER
Refill request for Rosuvastatin fails protocol because Last CMP was done 3/10/18. Last lipids were done 10/31/17. LOV 1/7/19 was acute. Future Appointments   Date Time Provider Lissette Heather   3/26/2019 11:45 AM Joe Harper DPM [id number] Kaci ALFARO   4/8/2019  8:30 AM Angel Luis Santiago MD [facility] 3 [facility]   4/30/2019  1:40 PM Viraj Barbosa MD [facility]     Routed to Dr Jayme Yarbrough.

## 2019-03-18 ENCOUNTER — TELEPHONE (OUTPATIENT)
Dept: FAMILY MEDICINE CLINIC | Facility: CLINIC | Age: 84
End: 2019-03-18

## 2019-03-18 DIAGNOSIS — Z12.31 ENCOUNTER FOR SCREENING MAMMOGRAM FOR BREAST CANCER: Primary | ICD-10-CM

## 2019-03-26 ENCOUNTER — OFFICE VISIT (OUTPATIENT)
Dept: PODIATRY CLINIC | Facility: CLINIC | Age: 84
End: 2019-03-26
Payer: MEDICARE

## 2019-03-26 DIAGNOSIS — N18.30 TYPE 2 DIABETES MELLITUS WITH STAGE 3 CHRONIC KIDNEY DISEASE, WITHOUT LONG-TERM CURRENT USE OF INSULIN (HCC): ICD-10-CM

## 2019-03-26 DIAGNOSIS — B35.1 ONYCHOMYCOSIS: ICD-10-CM

## 2019-03-26 DIAGNOSIS — M77.41 METATARSALGIA OF RIGHT FOOT: ICD-10-CM

## 2019-03-26 DIAGNOSIS — L84 CALLUS OF FOOT: Primary | ICD-10-CM

## 2019-03-26 DIAGNOSIS — E11.22 TYPE 2 DIABETES MELLITUS WITH STAGE 3 CHRONIC KIDNEY DISEASE, WITHOUT LONG-TERM CURRENT USE OF INSULIN (HCC): ICD-10-CM

## 2019-03-26 PROCEDURE — 99213 OFFICE O/P EST LOW 20 MIN: CPT | Performed by: PODIATRIST

## 2019-03-27 NOTE — PROGRESS NOTES
Олег Pina is a 81year old female. Patient presents with:  Toenail Care: Pt has seen Dr. Delio Estrada at previous No office. Nazario Amaral last OCT Injury to right foot and ankle. Continues to have ankle pain in the morning.  After she is up and moving the mouth daily. Disp:  Rfl:    Calcium Carbonate-Vitamin D (OSCAL 500/200 D-3) 500-200 MG-UNIT Oral Tab Take 1 Tab by mouth daily.  Disp:  Rfl:       Past Medical History:   Diagnosis Date   • Bursitis of shoulder, right    • Chest pain    • Coronary atheroscl Yes        Self-Exams: No          REVIEW OF SYSTEMS:   Review of Systems  Today reviewed systens as documented below  GENERAL HEALTH: feels well otherwise  SKIN: denies any unusual skin lesions or rashes  RESPIRATORY: denies shortness of breath with exert and agrees to the plan. Return in about 4 weeks (around 4/23/2019).     Kallie Blackwood DPM  3/27/2019

## 2019-03-28 ENCOUNTER — HOSPITAL ENCOUNTER (OUTPATIENT)
Dept: MAMMOGRAPHY | Age: 84
Discharge: HOME | End: 2019-03-28
Attending: FAMILY MEDICINE
Payer: MEDICARE

## 2019-03-28 DIAGNOSIS — Z12.31 ENCOUNTER FOR SCREENING MAMMOGRAM FOR BREAST CANCER: ICD-10-CM

## 2019-03-28 PROCEDURE — 77063 BREAST TOMOSYNTHESIS BI: CPT | Performed by: FAMILY MEDICINE

## 2019-03-28 PROCEDURE — 77067 SCR MAMMO BI INCL CAD: CPT | Performed by: FAMILY MEDICINE

## 2019-04-05 ENCOUNTER — APPOINTMENT (OUTPATIENT)
Dept: LAB | Age: 84
End: 2019-04-05
Attending: FAMILY MEDICINE
Payer: MEDICARE

## 2019-04-05 DIAGNOSIS — I10 HYPERTENSION, BENIGN: ICD-10-CM

## 2019-04-05 DIAGNOSIS — R80.9 MICROALBUMINURIA DUE TO TYPE 2 DIABETES MELLITUS (HCC): ICD-10-CM

## 2019-04-05 DIAGNOSIS — E11.22 TYPE 2 DIABETES MELLITUS WITH STAGE 3 CHRONIC KIDNEY DISEASE, WITHOUT LONG-TERM CURRENT USE OF INSULIN (HCC): ICD-10-CM

## 2019-04-05 DIAGNOSIS — E11.29 MICROALBUMINURIA DUE TO TYPE 2 DIABETES MELLITUS (HCC): ICD-10-CM

## 2019-04-05 DIAGNOSIS — N18.30 TYPE 2 DIABETES MELLITUS WITH STAGE 3 CHRONIC KIDNEY DISEASE, WITHOUT LONG-TERM CURRENT USE OF INSULIN (HCC): ICD-10-CM

## 2019-04-05 DIAGNOSIS — I25.10 CORONARY ARTERY DISEASE INVOLVING NATIVE CORONARY ARTERY OF NATIVE HEART WITHOUT ANGINA PECTORIS: ICD-10-CM

## 2019-04-05 DIAGNOSIS — E03.9 ACQUIRED HYPOTHYROIDISM: ICD-10-CM

## 2019-04-05 DIAGNOSIS — E78.2 MIXED HYPERLIPIDEMIA: ICD-10-CM

## 2019-04-05 PROCEDURE — 83036 HEMOGLOBIN GLYCOSYLATED A1C: CPT

## 2019-04-05 PROCEDURE — 80061 LIPID PANEL: CPT

## 2019-04-05 PROCEDURE — 82043 UR ALBUMIN QUANTITATIVE: CPT

## 2019-04-05 PROCEDURE — 82570 ASSAY OF URINE CREATININE: CPT

## 2019-04-05 PROCEDURE — 36415 COLL VENOUS BLD VENIPUNCTURE: CPT

## 2019-04-05 PROCEDURE — 80053 COMPREHEN METABOLIC PANEL: CPT

## 2019-04-05 PROCEDURE — 84443 ASSAY THYROID STIM HORMONE: CPT

## 2019-04-08 ENCOUNTER — OFFICE VISIT (OUTPATIENT)
Dept: FAMILY MEDICINE CLINIC | Facility: CLINIC | Age: 84
End: 2019-04-08
Payer: MEDICARE

## 2019-04-08 VITALS
RESPIRATION RATE: 16 BRPM | SYSTOLIC BLOOD PRESSURE: 118 MMHG | HEIGHT: 60 IN | TEMPERATURE: 97 F | HEART RATE: 87 BPM | WEIGHT: 182.5 LBS | BODY MASS INDEX: 35.83 KG/M2 | OXYGEN SATURATION: 98 % | DIASTOLIC BLOOD PRESSURE: 64 MMHG

## 2019-04-08 DIAGNOSIS — E03.9 ACQUIRED HYPOTHYROIDISM: ICD-10-CM

## 2019-04-08 DIAGNOSIS — E11.3293 MILD NONPROLIFERATIVE DIABETIC RETINOPATHY OF BOTH EYES ASSOCIATED WITH TYPE 2 DIABETES MELLITUS, MACULAR EDEMA PRESENCE UNSPECIFIED (HCC): ICD-10-CM

## 2019-04-08 DIAGNOSIS — I10 HYPERTENSION, BENIGN: ICD-10-CM

## 2019-04-08 DIAGNOSIS — E11.22 TYPE 2 DIABETES MELLITUS WITH STAGE 3 CHRONIC KIDNEY DISEASE, WITHOUT LONG-TERM CURRENT USE OF INSULIN (HCC): Primary | ICD-10-CM

## 2019-04-08 DIAGNOSIS — K59.00 CONSTIPATION, UNSPECIFIED CONSTIPATION TYPE: ICD-10-CM

## 2019-04-08 DIAGNOSIS — N18.30 TYPE 2 DIABETES MELLITUS WITH STAGE 3 CHRONIC KIDNEY DISEASE, WITHOUT LONG-TERM CURRENT USE OF INSULIN (HCC): Primary | ICD-10-CM

## 2019-04-08 PROCEDURE — 99214 OFFICE O/P EST MOD 30 MIN: CPT | Performed by: FAMILY MEDICINE

## 2019-04-08 NOTE — PROGRESS NOTES
Patient presents with:  Diabetes     HPI:   Daniel Akins is a 81year old female with PMH CAD, DM2 who presents to the office for check up. CAD - sees Nicole. No ongoing symptoms.       DM2 - DM remains controlled - A1C 6.3.  +non proliferative DR Calcium, Total 9.7 8.5 - 10.1 mg/dL    Calculated Osmolality 296 (H) 275 - 295 mOsm/kg    GFR, Non- 36 (L) >=60    GFR, -American 41 (L) >=60    AST 54 (H) 15 - 37 U/L    ALT 76 (H) 13 - 56 U/L    Alkaline Phosphatase 84 55 - 142 U/L

## 2019-04-20 DIAGNOSIS — I25.812 CORONARY ARTERY DISEASE INVOLVING BYPASS GRAFT OF TRANSPLANTED HEART, ANGINA PRESENCE UNSPECIFIED: ICD-10-CM

## 2019-04-20 DIAGNOSIS — I10 HYPERTENSION, BENIGN: ICD-10-CM

## 2019-04-22 NOTE — TELEPHONE ENCOUNTER
Requested Prescriptions     Pending Prescriptions Disp Refills   • METOPROLOL TARTRATE 25 MG Oral Tab [Pharmacy Med Name: METOPROLOL TARTRATE 25MG TABLETS] 180 tablet 0     Sig: TAKE 1 TABLET(25 MG) BY MOUTH TWICE DAILY       LOV 4/8/2019     Appointment s

## 2019-04-25 DIAGNOSIS — K21.9 GASTROESOPHAGEAL REFLUX DISEASE, ESOPHAGITIS PRESENCE NOT SPECIFIED: ICD-10-CM

## 2019-04-25 DIAGNOSIS — E78.2 MIXED HYPERLIPIDEMIA: ICD-10-CM

## 2019-04-25 DIAGNOSIS — E03.9 ACQUIRED HYPOTHYROIDISM: ICD-10-CM

## 2019-04-26 RX ORDER — EZETIMIBE 10 MG/1
TABLET ORAL
Qty: 90 TABLET | Refills: 1 | Status: SHIPPED | OUTPATIENT
Start: 2019-04-26 | End: 2020-03-30

## 2019-04-26 RX ORDER — PANTOPRAZOLE SODIUM 40 MG/1
TABLET, DELAYED RELEASE ORAL
Qty: 90 TABLET | Refills: 3 | Status: SHIPPED | OUTPATIENT
Start: 2019-04-26 | End: 2020-07-01

## 2019-04-26 RX ORDER — LEVOTHYROXINE SODIUM 88 UG/1
TABLET ORAL
Qty: 90 TABLET | Refills: 3 | Status: SHIPPED | OUTPATIENT
Start: 2019-04-26 | End: 2020-03-30

## 2019-04-26 NOTE — TELEPHONE ENCOUNTER
Refilled Levothyroxine And Ezetimibe per protocol. Pantoprazole is not on protocol. LOV 4/8/19.    Future Appointments   Date Time Provider Lissette Romero   4/30/2019  1:40 PM Kellie Geller MD [facility]     Routed to Dr Ced Kc

## 2019-06-18 NOTE — PLAN OF CARE
Patient/Family Short Term Goal Progressing    Has been afebrile VSS. On ceftriaxone and Tamiflu. IV discontinued, cath removed intact

## 2019-08-09 ENCOUNTER — OFFICE VISIT (OUTPATIENT)
Dept: FAMILY MEDICINE CLINIC | Facility: CLINIC | Age: 84
End: 2019-08-09
Payer: MEDICARE

## 2019-08-09 VITALS
RESPIRATION RATE: 16 BRPM | HEART RATE: 72 BPM | HEIGHT: 60 IN | BODY MASS INDEX: 35.93 KG/M2 | DIASTOLIC BLOOD PRESSURE: 62 MMHG | WEIGHT: 183 LBS | SYSTOLIC BLOOD PRESSURE: 112 MMHG | TEMPERATURE: 98 F

## 2019-08-09 DIAGNOSIS — N18.30 TYPE 2 DIABETES MELLITUS WITH STAGE 3 CHRONIC KIDNEY DISEASE, WITHOUT LONG-TERM CURRENT USE OF INSULIN (HCC): ICD-10-CM

## 2019-08-09 DIAGNOSIS — E11.22 TYPE 2 DIABETES MELLITUS WITH STAGE 3 CHRONIC KIDNEY DISEASE, WITHOUT LONG-TERM CURRENT USE OF INSULIN (HCC): ICD-10-CM

## 2019-08-09 DIAGNOSIS — L60.3 DYSTROPHIC NAIL: Primary | ICD-10-CM

## 2019-08-09 PROCEDURE — 99213 OFFICE O/P EST LOW 20 MIN: CPT | Performed by: PHYSICIAN ASSISTANT

## 2019-08-12 NOTE — PROGRESS NOTES
Patient presents with:  Toenail: since seeing podiatrist in march, right big toe has had some swelling and toenail changing color       HISTORY OF PRESENT ILLNESS  Ashley Reynolds is a 81year old female who presents for evaluation of potential toenail fady eyes associated with type 2 diabetes mellitus [facility])      Past Surgical History:   Procedure Laterality Date   • Angioplasty (coronary)  5/30/02    RCA graft stent   • Angioplasty (coronary)  6/23/04    stent RCA graft, IABP   • Cabg  1995    x5   • Maryellen

## 2019-08-26 ENCOUNTER — OFFICE VISIT (OUTPATIENT)
Dept: PODIATRY CLINIC | Facility: CLINIC | Age: 84
End: 2019-08-26
Payer: MEDICARE

## 2019-08-26 DIAGNOSIS — E11.22 TYPE 2 DIABETES MELLITUS WITH STAGE 3 CHRONIC KIDNEY DISEASE, WITHOUT LONG-TERM CURRENT USE OF INSULIN (HCC): Primary | ICD-10-CM

## 2019-08-26 DIAGNOSIS — B35.3 TINEA PEDIS, UNSPECIFIED LATERALITY: ICD-10-CM

## 2019-08-26 DIAGNOSIS — M20.11 VALGUS DEFORMITY OF BOTH GREAT TOES: ICD-10-CM

## 2019-08-26 DIAGNOSIS — N18.30 TYPE 2 DIABETES MELLITUS WITH STAGE 3 CHRONIC KIDNEY DISEASE, WITHOUT LONG-TERM CURRENT USE OF INSULIN (HCC): Primary | ICD-10-CM

## 2019-08-26 DIAGNOSIS — I73.9 PVD (PERIPHERAL VASCULAR DISEASE) (HCC): ICD-10-CM

## 2019-08-26 DIAGNOSIS — M20.12 VALGUS DEFORMITY OF BOTH GREAT TOES: ICD-10-CM

## 2019-08-26 DIAGNOSIS — B35.1 ONYCHOMYCOSIS: ICD-10-CM

## 2019-08-26 PROCEDURE — 99213 OFFICE O/P EST LOW 20 MIN: CPT | Performed by: PODIATRIST

## 2019-08-26 NOTE — PROGRESS NOTES
Tree Chance is a 81year old female. Patient presents with:  New Patient: Recently diagnosed with diabetes, A1C was 6.3 on 4/5/19. LOV with Dr Sofya Stubbs was on 4/8/19. Patient does not take any medications for her diabetes. FBS are not taken.  In the am pa Hypertension, benign    • Hypothyroidism 6/29/2012   • S/P CABG x 5    • Type II  diabetes mellitus  6/29/2012   • Unspecified vitamin D deficiency 6/29/2012      Past Surgical History:   Procedure Laterality Date   • ANGIOPLASTY (CORONARY)  5/30/02    RCA denies headaches  MUSCULO: denies arthritis, back pain      EXAM:   There were no vitals taken for this visit. Physical Exam  GENERAL: well developed, well nourished, in no apparent distress  EXTREMITIES:   1.  Integument: Normal skin temperature and turgo

## 2019-09-22 DIAGNOSIS — E78.2 MIXED HYPERLIPIDEMIA: ICD-10-CM

## 2019-09-23 ENCOUNTER — OFFICE VISIT (OUTPATIENT)
Dept: FAMILY MEDICINE CLINIC | Facility: CLINIC | Age: 84
End: 2019-09-23
Payer: MEDICARE

## 2019-09-23 VITALS
WEIGHT: 182.81 LBS | RESPIRATION RATE: 16 BRPM | HEART RATE: 96 BPM | TEMPERATURE: 98 F | DIASTOLIC BLOOD PRESSURE: 62 MMHG | SYSTOLIC BLOOD PRESSURE: 106 MMHG | BODY MASS INDEX: 34.96 KG/M2 | HEIGHT: 60.5 IN

## 2019-09-23 DIAGNOSIS — J02.9 PHARYNGITIS, UNSPECIFIED ETIOLOGY: Primary | ICD-10-CM

## 2019-09-23 PROCEDURE — 99213 OFFICE O/P EST LOW 20 MIN: CPT | Performed by: FAMILY MEDICINE

## 2019-09-23 RX ORDER — AMOXICILLIN AND CLAVULANATE POTASSIUM 875; 125 MG/1; MG/1
1 TABLET, FILM COATED ORAL 2 TIMES DAILY
Qty: 14 TABLET | Refills: 0 | Status: SHIPPED | OUTPATIENT
Start: 2019-09-23 | End: 2019-09-30

## 2019-09-23 RX ORDER — EZETIMIBE 10 MG/1
TABLET ORAL
Qty: 90 TABLET | Refills: 0 | Status: SHIPPED | OUTPATIENT
Start: 2019-09-23 | End: 2019-10-25

## 2019-09-23 NOTE — PROGRESS NOTES
Patient presents with:  Cough: Cough, sore throat, fatigue for 3 days. HPI:   Xiomara Ge is a 81year old female who presents to the office for cough. Illness in several patients recently and they all spent a lot of time together recently.    Fe

## 2019-09-23 NOTE — TELEPHONE ENCOUNTER
Requested Prescriptions     Pending Prescriptions Disp Refills   • EZETIMIBE 10 MG Oral Tab [Pharmacy Med Name: EZETIMIBE 10MG TABLETS] 90 tablet 0     Sig: TAKE 1 TABLET(10 MG) BY MOUTH DAILY     LOV 8/9/2019     Patient was asked to follow-up in: 2 month

## 2019-09-30 ENCOUNTER — HOSPITAL ENCOUNTER (OUTPATIENT)
Dept: ULTRASOUND IMAGING | Facility: HOSPITAL | Age: 84
Discharge: HOME | End: 2019-09-30
Attending: PODIATRIST
Payer: MEDICARE

## 2019-09-30 DIAGNOSIS — I73.9 PVD (PERIPHERAL VASCULAR DISEASE) (HCC): ICD-10-CM

## 2019-09-30 PROCEDURE — 93923 UPR/LXTR ART STDY 3+ LVLS: CPT | Performed by: PODIATRIST

## 2019-10-01 DIAGNOSIS — I10 HYPERTENSION, BENIGN: ICD-10-CM

## 2019-10-01 DIAGNOSIS — I25.812 CORONARY ARTERY DISEASE INVOLVING BYPASS GRAFT OF TRANSPLANTED HEART, ANGINA PRESENCE UNSPECIFIED: ICD-10-CM

## 2019-10-01 DIAGNOSIS — E78.2 MIXED HYPERLIPIDEMIA: ICD-10-CM

## 2019-10-01 RX ORDER — ROSUVASTATIN CALCIUM 40 MG/1
TABLET, COATED ORAL
Qty: 90 TABLET | Refills: 0 | Status: SHIPPED | OUTPATIENT
Start: 2019-10-01 | End: 2020-03-30

## 2019-10-02 ENCOUNTER — TELEPHONE (OUTPATIENT)
Dept: PODIATRY CLINIC | Facility: CLINIC | Age: 84
End: 2019-10-02

## 2019-10-02 ENCOUNTER — TELEPHONE (OUTPATIENT)
Dept: FAMILY MEDICINE CLINIC | Facility: CLINIC | Age: 84
End: 2019-10-02

## 2019-10-07 ENCOUNTER — OFFICE VISIT (OUTPATIENT)
Dept: PODIATRY CLINIC | Facility: CLINIC | Age: 84
End: 2019-10-07
Payer: MEDICARE

## 2019-10-07 VITALS — SYSTOLIC BLOOD PRESSURE: 135 MMHG | HEART RATE: 65 BPM | DIASTOLIC BLOOD PRESSURE: 70 MMHG | RESPIRATION RATE: 17 BRPM

## 2019-10-07 DIAGNOSIS — L60.0 ONYCHOCRYPTOSIS: Primary | ICD-10-CM

## 2019-10-07 DIAGNOSIS — N18.30 TYPE 2 DIABETES MELLITUS WITH STAGE 3 CHRONIC KIDNEY DISEASE, WITHOUT LONG-TERM CURRENT USE OF INSULIN (HCC): ICD-10-CM

## 2019-10-07 DIAGNOSIS — B35.1 ONYCHOMYCOSIS: ICD-10-CM

## 2019-10-07 DIAGNOSIS — E11.22 TYPE 2 DIABETES MELLITUS WITH STAGE 3 CHRONIC KIDNEY DISEASE, WITHOUT LONG-TERM CURRENT USE OF INSULIN (HCC): ICD-10-CM

## 2019-10-07 PROCEDURE — 11750 EXCISION NAIL&NAIL MATRIX: CPT | Performed by: PODIATRIST

## 2019-10-07 RX ORDER — CEPHALEXIN 500 MG/1
500 CAPSULE ORAL 3 TIMES DAILY
Qty: 21 CAPSULE | Refills: 0 | Status: SHIPPED | OUTPATIENT
Start: 2019-10-07 | End: 2019-10-24 | Stop reason: ALTCHOICE

## 2019-10-07 NOTE — PROGRESS NOTES
Permanent removal of medial border right hallux with chemical destruction of nail root. Patient had left office before I could obtain post injection vitals.

## 2019-10-07 NOTE — PROGRESS NOTES
Олег Pina is a 81year old female. Patient presents with: Follow - Up: Doppler results - Never checks her BS - she thinks she has fungus on right hallux nail.         HPI:     Presents today for follow-up evaluation she is had her Doppler studies daniela Laterality Date   • ANGIOPLASTY (CORONARY)  5/30/02    RCA graft stent   • ANGIOPLASTY (CORONARY)  6/23/04    stent RCA graft, IABP   • CABG  1995    x5   • COLONOSCOPY  11/2004   • HYSTERECTOMY  1985    partial hystero.    • LEG/ANKLE SURGERY PROC UNLISTED Resp 17   Physical Exam  GENERAL: well developed, well nourished, in no apparent distress  EXTREMITIES:   1. Integument: Normal skin temperature and turgor  2.  Vascular: Dorsalis pedis two out of four bilateral and posterior tibial pulses two out of   fou

## 2019-10-08 ENCOUNTER — OFFICE VISIT (OUTPATIENT)
Dept: FAMILY MEDICINE CLINIC | Facility: CLINIC | Age: 84
End: 2019-10-08
Payer: MEDICARE

## 2019-10-08 VITALS
TEMPERATURE: 98 F | DIASTOLIC BLOOD PRESSURE: 58 MMHG | RESPIRATION RATE: 16 BRPM | HEART RATE: 60 BPM | WEIGHT: 180 LBS | SYSTOLIC BLOOD PRESSURE: 116 MMHG | BODY MASS INDEX: 35.34 KG/M2 | HEIGHT: 60 IN

## 2019-10-08 DIAGNOSIS — Z23 FLU VACCINE NEED: ICD-10-CM

## 2019-10-08 DIAGNOSIS — E78.2 MIXED HYPERLIPIDEMIA: ICD-10-CM

## 2019-10-08 DIAGNOSIS — N18.30 TYPE 2 DIABETES MELLITUS WITH STAGE 3 CHRONIC KIDNEY DISEASE, WITHOUT LONG-TERM CURRENT USE OF INSULIN (HCC): ICD-10-CM

## 2019-10-08 DIAGNOSIS — Z00.00 ENCOUNTER FOR ANNUAL HEALTH EXAMINATION: Primary | ICD-10-CM

## 2019-10-08 DIAGNOSIS — Z13.31 DEPRESSION SCREENING: ICD-10-CM

## 2019-10-08 DIAGNOSIS — E11.22 TYPE 2 DIABETES MELLITUS WITH STAGE 3 CHRONIC KIDNEY DISEASE, WITHOUT LONG-TERM CURRENT USE OF INSULIN (HCC): ICD-10-CM

## 2019-10-08 DIAGNOSIS — R80.9 MICROALBUMINURIA DUE TO TYPE 2 DIABETES MELLITUS (HCC): ICD-10-CM

## 2019-10-08 DIAGNOSIS — Z95.1 S/P CABG X 5: ICD-10-CM

## 2019-10-08 DIAGNOSIS — E11.29 MICROALBUMINURIA DUE TO TYPE 2 DIABETES MELLITUS (HCC): ICD-10-CM

## 2019-10-08 DIAGNOSIS — I25.10 CORONARY ARTERY DISEASE INVOLVING NATIVE HEART WITHOUT ANGINA PECTORIS, UNSPECIFIED VESSEL OR LESION TYPE: ICD-10-CM

## 2019-10-08 DIAGNOSIS — E11.3293 MILD NONPROLIFERATIVE DIABETIC RETINOPATHY OF BOTH EYES ASSOCIATED WITH TYPE 2 DIABETES MELLITUS, MACULAR EDEMA PRESENCE UNSPECIFIED (HCC): ICD-10-CM

## 2019-10-08 DIAGNOSIS — E03.9 ACQUIRED HYPOTHYROIDISM: ICD-10-CM

## 2019-10-08 DIAGNOSIS — I10 ESSENTIAL HYPERTENSION: ICD-10-CM

## 2019-10-08 PROBLEM — R73.01 ELEVATED FASTING GLUCOSE: Status: ACTIVE | Noted: 2019-10-08

## 2019-10-08 PROCEDURE — G0008 ADMIN INFLUENZA VIRUS VAC: HCPCS | Performed by: FAMILY MEDICINE

## 2019-10-08 PROCEDURE — 83036 HEMOGLOBIN GLYCOSYLATED A1C: CPT | Performed by: FAMILY MEDICINE

## 2019-10-08 PROCEDURE — 90662 IIV NO PRSV INCREASED AG IM: CPT | Performed by: FAMILY MEDICINE

## 2019-10-08 PROCEDURE — G0439 PPPS, SUBSEQ VISIT: HCPCS | Performed by: FAMILY MEDICINE

## 2019-10-08 PROCEDURE — G0444 DEPRESSION SCREEN ANNUAL: HCPCS | Performed by: FAMILY MEDICINE

## 2019-10-08 NOTE — PATIENT INSTRUCTIONS
[address] SCREENING SCHEDULE   Tests on this list are recommended by your physician but may not be covered, or covered at this frequency, by your insurer. Please check with your insurance carrier before scheduling to verify coverage.    PREVENTATI Limited to patients who meet one of the following criteria:   • Men who are 73-68 years old and have smoked more than 100 cigarettes in their lifetime   • Anyone with a family history    Colorectal Cancer Screening  Covered up to Age 76     Colonoscopy Scr display for this patient.  Please get this Mammogram regularly   Immunizations      Influenza  Covered Annually Orders placed or performed in visit on 10/08/19   • FLU VACC HIGH DOSE PRSV FREE   Orders placed or performed in visit on 09/27/16   • FLU VAC NO http://[email or web address] idph.state. [place]/public/books/advin.htm  A link to the [place]. This site has a lot of good information including definitions of the different types of Advance Directives.  It also has the State forms available on it's webs

## 2019-10-08 NOTE — PROGRESS NOTES
HPI:   Rosalia Baer is a 81year old female who presents for a Medicare Subsequent Annual Wellness visit (Pt already had Initial Annual Wellness). Preventative  Breast: 3/28/2019. Normal.   Colon: n/a - no indication.    Pap: n/a  Immunizations: PNA date: 1995        Years since quittin.7      Smokeless tobacco: Never Used       Ms. Ananya Ohara already takes aspirin and has it on her medication list.   CAGE Alcohol screening   Emma Lanza was screened for Alcohol abuse and had a score of 0 so for the 10/8/19 encounter (Office Visit) with Rodger Soriano MD:  cephALEXin (KEFLEX) 500 MG Oral Cap Take 1 capsule (500 mg total) by mouth 3 (three) times daily.    ROSUVASTATIN CALCIUM 40 MG Oral Tab TAKE 1 TABLET(40 MG) BY MOUTH EVERY DAY   METOPROLO negative  Eyes: negative  Ears, nose, mouth, throat, and face: +PND, rhinorrhea  Respiratory: +cough  Cardiovascular: negative  Gastrointestinal: negative  Genitourinary: negative  Neurological: negative      EXAM:   /58   Pulse 60   Temp 97.7 °F (36 Pended   • FLU VACC High Dose 65 YRS & Older PRSV Free (55340) 10/08/2019        ASSESSMENT AND OTHER RELEVANT CHRONIC CONDITIONS:   Lety Ramos is a 81year old female who presents for a Medicare Assessment.      PLAN SUMMARY:     Lety Ramos was s without trying?: 2 - No  Has your appetite been poor?: No  How does the patient maintain a good energy level?: (i currently do not exercise)  How would you describe your daily physical activity?: Light  How would you describe your current health state?: Fa Maintenance if applicable    Chlamydia  Annually if high risk No results found for: CHLAMYDIA No flowsheet data found.     Screening Mammogram      Mammogram Annually to 77, then as discussed There are no preventive care reminders to display for this patien Last Dilated Eye Exam 4/4/2019     No flowsheet data found.             Template: STANLEY SWARTZ MEDICARE ANNUAL ASSESSMENT FEMALE [93886]

## 2019-10-14 ENCOUNTER — TELEPHONE (OUTPATIENT)
Dept: FAMILY MEDICINE CLINIC | Facility: CLINIC | Age: 84
End: 2019-10-14

## 2019-10-14 DIAGNOSIS — R05.9 COUGH: Primary | ICD-10-CM

## 2019-10-14 RX ORDER — PREDNISONE 20 MG/1
40 TABLET ORAL DAILY
Qty: 10 TABLET | Refills: 0 | Status: SHIPPED | OUTPATIENT
Start: 2019-10-14 | End: 2019-10-19

## 2019-10-14 NOTE — TELEPHONE ENCOUNTER
Pt was seen by Dr Madi Mantilla last week and he told her that if she still had the cough this week to call and he would call in a Rx and she still has the cough and she would like that called in.

## 2019-10-14 NOTE — TELEPHONE ENCOUNTER
Emiliana Garcia is expectorating rojas to light yellow mucus throughout the day. Her cough is worse on waking but gets a little better as the day progresses.   At her visit with Siva Gastelum on 10/8/19 she said she was told if her cough is still present after a week,

## 2019-10-23 ENCOUNTER — TELEPHONE (OUTPATIENT)
Dept: FAMILY MEDICINE CLINIC | Facility: CLINIC | Age: 84
End: 2019-10-23

## 2019-10-23 NOTE — TELEPHONE ENCOUNTER
Patient is calling because she thinks she has a bladder infection. Patient wants to drop off a sample without being seen.

## 2019-10-23 NOTE — TELEPHONE ENCOUNTER
Pt c/o pain on urination x 2 days. Advised Pt to go to Walk -In clinic for evaluation due to no appt available in office today. Pt does not want to to to Walk In clinic and request an appt,.  Appointment given for tomorrow- advised pushing fluids and cranbe

## 2019-10-24 ENCOUNTER — OFFICE VISIT (OUTPATIENT)
Dept: PODIATRY CLINIC | Facility: CLINIC | Age: 84
End: 2019-10-24
Payer: MEDICARE

## 2019-10-24 ENCOUNTER — OFFICE VISIT (OUTPATIENT)
Dept: FAMILY MEDICINE CLINIC | Facility: CLINIC | Age: 84
End: 2019-10-24
Payer: MEDICARE

## 2019-10-24 VITALS
RESPIRATION RATE: 20 BRPM | WEIGHT: 182 LBS | HEART RATE: 68 BPM | BODY MASS INDEX: 34.36 KG/M2 | DIASTOLIC BLOOD PRESSURE: 64 MMHG | HEIGHT: 61 IN | TEMPERATURE: 97 F | SYSTOLIC BLOOD PRESSURE: 122 MMHG

## 2019-10-24 DIAGNOSIS — R30.0 DYSURIA: Primary | ICD-10-CM

## 2019-10-24 DIAGNOSIS — B35.1 ONYCHOMYCOSIS: ICD-10-CM

## 2019-10-24 DIAGNOSIS — N30.01 ACUTE CYSTITIS WITH HEMATURIA: ICD-10-CM

## 2019-10-24 DIAGNOSIS — L60.0 ONYCHOCRYPTOSIS: Primary | ICD-10-CM

## 2019-10-24 PROCEDURE — 87086 URINE CULTURE/COLONY COUNT: CPT | Performed by: FAMILY MEDICINE

## 2019-10-24 PROCEDURE — 87186 SC STD MICRODIL/AGAR DIL: CPT | Performed by: FAMILY MEDICINE

## 2019-10-24 PROCEDURE — 87088 URINE BACTERIA CULTURE: CPT | Performed by: FAMILY MEDICINE

## 2019-10-24 PROCEDURE — 81003 URINALYSIS AUTO W/O SCOPE: CPT | Performed by: FAMILY MEDICINE

## 2019-10-24 PROCEDURE — 99213 OFFICE O/P EST LOW 20 MIN: CPT | Performed by: FAMILY MEDICINE

## 2019-10-24 PROCEDURE — 99212 OFFICE O/P EST SF 10 MIN: CPT | Performed by: PODIATRIST

## 2019-10-24 RX ORDER — NITROFURANTOIN 25; 75 MG/1; MG/1
100 CAPSULE ORAL 2 TIMES DAILY
Qty: 10 CAPSULE | Refills: 0 | Status: SHIPPED | OUTPATIENT
Start: 2019-10-24 | End: 2019-10-29

## 2019-10-24 NOTE — PROGRESS NOTES
aFy Jernigan is a 81year old female. Patient presents with: Follow - Up: Right foot hallux. Patient denies any pain or swelling        HPI:     For evaluation of her recent nail procedure.   She is completed her antibiotics and is not having any discom partial hystero.    • LEG/ANKLE SURGERY PROC UNLISTED  1997    angioplasty of left leg   • LEG/ANKLE SURGERY PROC UNLISTED  05/2002    angioplasty of right leg   • SURGICAL STENT      Coronary Artery Stent   • TOE METATARSAL OPEN REDUCTION INTERNAL FIXATION normal.   3. Musculoskeletal: All muscle groups are graded 5 out of 5 in the foot and ankle.    4. Neurological: Normal sharp dull sensation; reflexes normal.  [place] nylon filament test          ASSESSMENT AND PLAN:   Diagnoses and all orders for

## 2019-10-24 NOTE — PROGRESS NOTES
Patient presents with:  Painful Urination: Painful urination, urinary frequency for 2 days. HPI:   Mariella Rojas is a 81year old female who presents to the office for urine issues. Symptoms started late Tuesday, yesterday worse. Dysuria. Future  - URINE CULTURE, ROUTINE        [facility] M.D.   [facility]  10/24/19

## 2020-03-29 DIAGNOSIS — E03.9 ACQUIRED HYPOTHYROIDISM: ICD-10-CM

## 2020-03-29 DIAGNOSIS — E78.2 MIXED HYPERLIPIDEMIA: ICD-10-CM

## 2020-03-30 RX ORDER — LEVOTHYROXINE SODIUM 88 UG/1
TABLET ORAL
Qty: 90 TABLET | Refills: 1 | Status: SHIPPED | OUTPATIENT
Start: 2020-03-30 | End: 2020-09-28

## 2020-03-30 RX ORDER — EZETIMIBE 10 MG/1
TABLET ORAL
Qty: 90 TABLET | Refills: 1 | Status: SHIPPED | OUTPATIENT
Start: 2020-03-30 | End: 2020-05-26

## 2020-03-30 RX ORDER — ROSUVASTATIN CALCIUM 40 MG/1
TABLET, COATED ORAL
Qty: 90 TABLET | Refills: 1 | Status: SHIPPED | OUTPATIENT
Start: 2020-03-30 | End: 2020-05-26

## 2020-03-30 NOTE — TELEPHONE ENCOUNTER
Requested Prescriptions     Pending Prescriptions Disp Refills   • ROSUVASTATIN CALCIUM 40 MG Oral Tab [Pharmacy Med Name: ROSUVASTATIN 40MG TABLETS] 90 tablet 0     Sig: TAKE 1 TABLET(40 MG) BY MOUTH EVERY DAY   • LEVOTHYROXINE SODIUM 88 MCG Oral Tab [Pha

## 2020-05-22 ENCOUNTER — APPOINTMENT (OUTPATIENT)
Dept: LAB | Age: 85
End: 2020-05-22
Attending: INTERNAL MEDICINE
Payer: MEDICARE

## 2020-05-22 DIAGNOSIS — I25.10 CORONARY ARTERY DISEASE INVOLVING NATIVE CORONARY ARTERY OF NATIVE HEART WITHOUT ANGINA PECTORIS: ICD-10-CM

## 2020-05-22 DIAGNOSIS — I10 HYPERTENSION, BENIGN: ICD-10-CM

## 2020-05-22 DIAGNOSIS — E78.00 HYPERCHOLESTEROLEMIA: ICD-10-CM

## 2020-05-22 DIAGNOSIS — K21.9 GASTROESOPHAGEAL REFLUX DISEASE, ESOPHAGITIS PRESENCE NOT SPECIFIED: ICD-10-CM

## 2020-05-22 PROCEDURE — 80076 HEPATIC FUNCTION PANEL: CPT

## 2020-05-22 PROCEDURE — 36415 COLL VENOUS BLD VENIPUNCTURE: CPT

## 2020-05-22 PROCEDURE — 80061 LIPID PANEL: CPT

## 2020-06-01 RX ORDER — MULTIVITAMIN
1 TABLET ORAL
COMMUNITY

## 2020-06-02 ENCOUNTER — LAB ENCOUNTER (OUTPATIENT)
Dept: LAB | Age: 85
End: 2020-06-02
Attending: INTERNAL MEDICINE
Payer: MEDICARE

## 2020-06-02 ENCOUNTER — LAB ENCOUNTER (OUTPATIENT)
Dept: LAB | Facility: HOSPITAL | Age: 85
End: 2020-06-02
Attending: INTERNAL MEDICINE
Payer: MEDICARE

## 2020-06-02 DIAGNOSIS — Z01.818 PRE-OP TESTING: ICD-10-CM

## 2020-06-02 DIAGNOSIS — I25.10 CAD (CORONARY ARTERY DISEASE): ICD-10-CM

## 2020-06-02 DIAGNOSIS — I25.10 CORONARY ARTERY DISEASE INVOLVING NATIVE CORONARY ARTERY OF NATIVE HEART WITHOUT ANGINA PECTORIS: ICD-10-CM

## 2020-06-02 DIAGNOSIS — I10 ESSENTIAL HYPERTENSION: ICD-10-CM

## 2020-06-02 PROCEDURE — 36415 COLL VENOUS BLD VENIPUNCTURE: CPT

## 2020-06-02 PROCEDURE — 80053 COMPREHEN METABOLIC PANEL: CPT

## 2020-06-02 PROCEDURE — 85025 COMPLETE CBC W/AUTO DIFF WBC: CPT

## 2020-06-03 ENCOUNTER — APPOINTMENT (OUTPATIENT)
Dept: LAB | Facility: HOSPITAL | Age: 85
End: 2020-06-03
Attending: INTERNAL MEDICINE
Payer: MEDICARE

## 2020-06-03 ENCOUNTER — HOSPITAL ENCOUNTER (OUTPATIENT)
Dept: INTERVENTIONAL RADIOLOGY/VASCULAR | Facility: HOSPITAL | Age: 85
Discharge: HOME | End: 2020-06-03
Attending: INTERNAL MEDICINE | Admitting: INTERNAL MEDICINE
Payer: MEDICARE

## 2020-06-03 VITALS
OXYGEN SATURATION: 100 % | TEMPERATURE: 98 F | SYSTOLIC BLOOD PRESSURE: 145 MMHG | HEIGHT: 61 IN | RESPIRATION RATE: 16 BRPM | DIASTOLIC BLOOD PRESSURE: 59 MMHG | BODY MASS INDEX: 35.87 KG/M2 | HEART RATE: 73 BPM | WEIGHT: 190 LBS

## 2020-06-03 DIAGNOSIS — I25.10 CAD (CORONARY ARTERY DISEASE): Primary | ICD-10-CM

## 2020-06-03 DIAGNOSIS — Z01.818 PRE-OP TESTING: ICD-10-CM

## 2020-06-03 PROCEDURE — B2111ZZ FLUOROSCOPY OF MULTIPLE CORONARY ARTERIES USING LOW OSMOLAR CONTRAST: ICD-10-PCS | Performed by: INTERNAL MEDICINE

## 2020-06-03 PROCEDURE — 93459 L HRT ART/GRFT ANGIO: CPT

## 2020-06-03 PROCEDURE — 99153 MOD SED SAME PHYS/QHP EA: CPT

## 2020-06-03 PROCEDURE — 4A023N7 MEASUREMENT OF CARDIAC SAMPLING AND PRESSURE, LEFT HEART, PERCUTANEOUS APPROACH: ICD-10-PCS | Performed by: INTERNAL MEDICINE

## 2020-06-03 PROCEDURE — B2131ZZ FLUOROSCOPY OF MULTIPLE CORONARY ARTERY BYPASS GRAFTS USING LOW OSMOLAR CONTRAST: ICD-10-PCS | Performed by: INTERNAL MEDICINE

## 2020-06-03 PROCEDURE — B2151ZZ FLUOROSCOPY OF LEFT HEART USING LOW OSMOLAR CONTRAST: ICD-10-PCS | Performed by: INTERNAL MEDICINE

## 2020-06-03 PROCEDURE — 99152 MOD SED SAME PHYS/QHP 5/>YRS: CPT

## 2020-06-03 RX ORDER — LIDOCAINE HYDROCHLORIDE 10 MG/ML
INJECTION, SOLUTION EPIDURAL; INFILTRATION; INTRACAUDAL; PERINEURAL
Status: COMPLETED
Start: 2020-06-03 | End: 2020-06-03

## 2020-06-03 RX ORDER — HEPARIN SODIUM 5000 [USP'U]/ML
INJECTION, SOLUTION INTRAVENOUS; SUBCUTANEOUS
Status: COMPLETED
Start: 2020-06-03 | End: 2020-06-03

## 2020-06-03 RX ORDER — MIDAZOLAM HYDROCHLORIDE 1 MG/ML
INJECTION INTRAMUSCULAR; INTRAVENOUS
Status: COMPLETED
Start: 2020-06-03 | End: 2020-06-03

## 2020-06-03 RX ORDER — SODIUM CHLORIDE 9 MG/ML
INJECTION, SOLUTION INTRAVENOUS
Status: DISCONTINUED | OUTPATIENT
Start: 2020-06-04 | End: 2020-06-03 | Stop reason: HOSPADM

## 2020-06-03 NOTE — PROGRESS NOTES
Post-cath Note  Left heart catheterization, left ventriculogram, bilateral selective angiogram, vein bypass angiography x 2, right iliofemoral angiogram, Mynx closure device.   Preliminary report:  Left main–normal  LAD–mid vessel 70% narrowing occluded michelle

## 2020-06-03 NOTE — PROGRESS NOTES
Precath Note  Patient with history of CAD/CABG veins with 2 proximal anastomoses and 3 distal targets. Pt with positive nuclear stress test–inferior lateral ischemia. Plan diagnostic cardiac catheterization.   Risks benefits and alternatives been shared w

## 2020-06-03 NOTE — PROGRESS NOTES
Received patient from cath lab s/p [facility] with Dr. Luciana Kirk. Right groin site with mynx closure device soft, CDI, no hematoma. +1 pedal pulse. VSS. Patient denies any pain. Dr. Luciana Kirk @ bedside. Son Beti Stephenson updated over the phone. Patient tolerating po intake.

## 2020-06-03 NOTE — PROCEDURES
Sedation was assessed and monitored by myself and the cath lab staff. IV access was established and IV fluids were maintained. Initially and throughout the course and after the procedure the state of conscious sedation was assessed and supervised.   Beti Barber

## 2020-06-04 NOTE — PROCEDURES
[facility]    PATIENT'S NAME: Sharmin Muna   ATTENDING PHYSICIAN: Harley Farmer M.D. OPERATING PHYSICIAN: Harley Farmer M.D.    PATIENT ACCOUNT#:   [de-identified]    LOCATION:  [place] [address] ED  MEDICAL RECORD #:   [id number]       DATE 30-degree TONG angle using 40 ml of nonionic contrast dye injected over a 4-second period. At the conclusion of the LV gram, again hemodynamic pressure measurements were obtained, and a pressure recording was performed during an aortic valve pullback.   Aft restenosis, but the vessel is at least a 3 mm vessel at that site. This sequential vein bypass has a natural Y bifurcation that reconstitutes making an appearance of an Jose. This has been referred to previously.     6.   LEFT VENTRICULOGRAM:  The left there was good hemostasis. A strong distal pulse was seen and no ischemia of the leg. CONCLUSION:    1. Three vessel CAD as above. 2.   Patent vein bypass grafts with 2 proximal and 3 distal anastomoses. 3.   Mild inferior hypokinesia.    4.   Nor

## 2020-06-28 DIAGNOSIS — K21.9 GASTROESOPHAGEAL REFLUX DISEASE, ESOPHAGITIS PRESENCE NOT SPECIFIED: ICD-10-CM

## 2020-07-01 RX ORDER — PANTOPRAZOLE SODIUM 40 MG/1
TABLET, DELAYED RELEASE ORAL
Qty: 90 TABLET | Refills: 3 | Status: SHIPPED | OUTPATIENT
Start: 2020-07-01 | End: 2021-04-27

## 2020-07-01 NOTE — TELEPHONE ENCOUNTER
Refill request for:    Requested Prescriptions     Pending Prescriptions Disp Refills   • PANTOPRAZOLE SODIUM 40 MG Oral Tab EC [Pharmacy Med Name: PANTOPRAZOLE 40MG TABLETS] 90 tablet 3     Sig: TAKE 1 TABLET(40 MG) BY MOUTH DAILY        Last Prescribed Q

## 2020-08-04 ENCOUNTER — OFFICE VISIT (OUTPATIENT)
Dept: FAMILY MEDICINE CLINIC | Facility: CLINIC | Age: 85
End: 2020-08-04
Payer: MEDICARE

## 2020-08-04 VITALS
RESPIRATION RATE: 16 BRPM | HEART RATE: 80 BPM | SYSTOLIC BLOOD PRESSURE: 124 MMHG | TEMPERATURE: 97 F | HEIGHT: 60 IN | WEIGHT: 192 LBS | DIASTOLIC BLOOD PRESSURE: 72 MMHG | BODY MASS INDEX: 37.69 KG/M2

## 2020-08-04 DIAGNOSIS — I25.10 CORONARY ARTERY DISEASE INVOLVING NATIVE CORONARY ARTERY OF NATIVE HEART WITHOUT ANGINA PECTORIS: Primary | ICD-10-CM

## 2020-08-04 DIAGNOSIS — E66.01 SEVERE OBESITY (BMI 35.0-39.9) WITH COMORBIDITY (HCC): ICD-10-CM

## 2020-08-04 DIAGNOSIS — E03.9 ACQUIRED HYPOTHYROIDISM: ICD-10-CM

## 2020-08-04 DIAGNOSIS — G31.84 MCI (MILD COGNITIVE IMPAIRMENT): ICD-10-CM

## 2020-08-04 DIAGNOSIS — I10 ESSENTIAL HYPERTENSION: ICD-10-CM

## 2020-08-04 DIAGNOSIS — Z95.1 S/P CABG X 5: ICD-10-CM

## 2020-08-04 DIAGNOSIS — R73.01 ELEVATED FASTING GLUCOSE: ICD-10-CM

## 2020-08-04 PROBLEM — E11.3293 MILD NONPROLIFERATIVE DIABETIC RETINOPATHY OF BOTH EYES ASSOCIATED WITH TYPE 2 DIABETES MELLITUS (HCC): Status: RESOLVED | Noted: 2019-04-08 | Resolved: 2020-08-04

## 2020-08-04 PROBLEM — R80.9 MICROALBUMINURIA DUE TO TYPE 2 DIABETES MELLITUS (HCC): Status: RESOLVED | Noted: 2017-10-03 | Resolved: 2020-08-04

## 2020-08-04 PROBLEM — E11.29 MICROALBUMINURIA DUE TO TYPE 2 DIABETES MELLITUS: Status: RESOLVED | Noted: 2017-10-03 | Resolved: 2020-08-04

## 2020-08-04 PROBLEM — E11.29 MICROALBUMINURIA DUE TO TYPE 2 DIABETES MELLITUS  (HCC): Status: RESOLVED | Noted: 2017-10-03 | Resolved: 2020-08-04

## 2020-08-04 PROBLEM — R80.9 MICROALBUMINURIA DUE TO TYPE 2 DIABETES MELLITUS: Status: RESOLVED | Noted: 2017-10-03 | Resolved: 2020-08-04

## 2020-08-04 PROBLEM — E11.29 MICROALBUMINURIA DUE TO TYPE 2 DIABETES MELLITUS (HCC): Status: RESOLVED | Noted: 2017-10-03 | Resolved: 2020-08-04

## 2020-08-04 PROBLEM — R80.9 MICROALBUMINURIA DUE TO TYPE 2 DIABETES MELLITUS  (HCC): Status: RESOLVED | Noted: 2017-10-03 | Resolved: 2020-08-04

## 2020-08-04 PROCEDURE — 99214 OFFICE O/P EST MOD 30 MIN: CPT | Performed by: FAMILY MEDICINE

## 2020-08-04 NOTE — PROGRESS NOTES
Patient presents with:  Memory Loss: x2 months     HPI:   Keo Diggs is a 81year old female who presents to the office for 6 mo check up. She is increasingly concerned about her memory.       Heart - in May failed stress test.  In June, cath showed t Tablet 12 Hr, Take 1 tablet by mouth daily. , Disp: , Rfl:     No current facility-administered medications on file prior to visit.        Allergies:  No Known Allergies    Past Surgical History:   Procedure Laterality Date   • Angioplasty (coronary)  5/30/0 nondistended, no masses or organomegaly  Neurological - alert, oriented, normal speech, no focal findings or movement disorder noted  Musculoskeletal - no joint tenderness, deformity or swelling  Extremities - peripheral pulses normal, no pedal edema, no c

## 2020-09-22 ENCOUNTER — OFFICE VISIT (OUTPATIENT)
Dept: PODIATRY CLINIC | Facility: CLINIC | Age: 85
End: 2020-09-22
Payer: MEDICARE

## 2020-09-22 DIAGNOSIS — M76.829 POSTERIOR TIBIAL TENDON DYSFUNCTION: ICD-10-CM

## 2020-09-22 DIAGNOSIS — M79.671 RIGHT FOOT PAIN: Primary | ICD-10-CM

## 2020-09-22 PROCEDURE — 99213 OFFICE O/P EST LOW 20 MIN: CPT | Performed by: PODIATRIST

## 2020-09-22 NOTE — PROGRESS NOTES
Efren Ribeiro is a 81year old female. Patient presents with: Foot Pain: right foot/ankle pain for a while - pain scale in the morning 10/10 and gets better towards the day - denies taking pain medication.         HPI:     Patient presents today stating 5/30/02    RCA graft stent   • ANGIOPLASTY (CORONARY)  6/23/04    stent RCA graft, IABP   • CABG  1995    x5   • COLONOSCOPY  11/2004   • HYSTERECTOMY  1985    partial hystero.    • LEG/ANKLE SURGERY PROC UNLISTED  1997    angioplasty of left leg   • LEG/AN Normal skin temperature and turgor  2.  Vascular: Dorsalis pedis two out of four bilateral and posterior tibial pulses two out of   four bilateral, capillary refill normal.   3. Musculoskeletal: All muscle groups are graded 5 out of 5 in the foot and ankle

## 2020-09-25 DIAGNOSIS — E03.9 ACQUIRED HYPOTHYROIDISM: ICD-10-CM

## 2020-09-25 NOTE — TELEPHONE ENCOUNTER
Refill request for:    Requested Prescriptions     Pending Prescriptions Disp Refills   • LEVOTHYROXINE SODIUM 88 MCG Oral Tab [Pharmacy Med Name: LEVOTHYROXINE 0.088MG (88MCG) TAB] 90 tablet 1     Sig: TAKE 1 TABLET BY MOUTH DAILY BEFORE BREAKFAST

## 2020-09-28 RX ORDER — LEVOTHYROXINE SODIUM 88 UG/1
TABLET ORAL
Qty: 90 TABLET | Refills: 1 | Status: SHIPPED | OUTPATIENT
Start: 2020-09-28 | End: 2021-09-07

## 2020-09-29 ENCOUNTER — OFFICE VISIT (OUTPATIENT)
Dept: PODIATRY CLINIC | Facility: CLINIC | Age: 85
End: 2020-09-29
Payer: MEDICARE

## 2020-09-29 DIAGNOSIS — M79.671 RIGHT FOOT PAIN: Primary | ICD-10-CM

## 2020-09-29 DIAGNOSIS — M76.829 POSTERIOR TIBIAL TENDON DYSFUNCTION: ICD-10-CM

## 2020-09-29 PROCEDURE — L4361 PNEUMA/VAC WALK BOOT PRE OTS: HCPCS | Performed by: PODIATRIST

## 2020-09-29 PROCEDURE — 99213 OFFICE O/P EST LOW 20 MIN: CPT | Performed by: PODIATRIST

## 2020-09-29 NOTE — PROGRESS NOTES
Fay Jernigan is a 81year old female. Patient presents with: Follow - Up: can't bear weight on right foot - pain scale 10/10.         HPI:     Patient presented today because she had very difficult time bearing weight she is got an appointment with ther LEG/ANKLE SURGERY PROC UNLISTED  1997    angioplasty of left leg   • LEG/ANKLE SURGERY PROC UNLISTED  05/2002    angioplasty of right leg   • SURGICAL STENT      Coronary Artery Stent   • TOE METATARSAL OPEN REDUCTION INTERNAL FIXATION Left 10/25/2014    P Musculoskeletal: All muscle groups are graded 5 out of 5 in the foot and ankle.    4. Neurological: Normal sharp dull sensation; reflexes normal.  She has pain over the posterior tib tendon with weakness swelling is present specially on the right foot there

## 2020-10-02 ENCOUNTER — OFFICE VISIT (OUTPATIENT)
Dept: PHYSICAL THERAPY | Age: 85
End: 2020-10-02
Attending: PODIATRIST
Payer: MEDICARE

## 2020-10-02 DIAGNOSIS — M79.671 RIGHT FOOT PAIN: ICD-10-CM

## 2020-10-02 DIAGNOSIS — M76.829 POSTERIOR TIBIAL TENDON DYSFUNCTION: ICD-10-CM

## 2020-10-02 PROCEDURE — 97110 THERAPEUTIC EXERCISES: CPT

## 2020-10-02 PROCEDURE — 97161 PT EVAL LOW COMPLEX 20 MIN: CPT

## 2020-10-04 NOTE — PROGRESS NOTES
LOWER EXTREMITY EVALUATION:   Referring Physician: Dr. Morgan Velez  Diagnosis:   R LE post tib tendonitis  Date of Service: 10/4/2020     PATIENT SUMMARY   Daniel Akins is a 81year old female who presents to therapy today with complaints of  R post tib Therapy is medically necessary to address the above impairments and reach functional goals.      Precautions:   none  OBJECTIVE:   Observation: antalgic gait, decreased stance on the R.   Palpation: + R post tib  Sensation: no report of tingling or numbness this evaluation involved low  decision making due to 1-2 personal factors/comorbidities, 3 body structures involved/activity limitations, and evolving  symptoms including changing pain levels. PLAN OF CARE:    Goals: (to be met in  10  visits)  1.  Pt to b

## 2020-10-05 ENCOUNTER — TELEPHONE (OUTPATIENT)
Dept: FAMILY MEDICINE CLINIC | Facility: CLINIC | Age: 85
End: 2020-10-05

## 2020-10-05 NOTE — TELEPHONE ENCOUNTER
Spoke to pt who was preparing dinner and asked that we call tomorrow afternoon so she can complete the MAWV Questionnaire.

## 2020-10-07 ENCOUNTER — OFFICE VISIT (OUTPATIENT)
Dept: PHYSICAL THERAPY | Age: 85
End: 2020-10-07
Attending: PODIATRIST
Payer: MEDICARE

## 2020-10-07 PROCEDURE — 97110 THERAPEUTIC EXERCISES: CPT

## 2020-10-07 NOTE — PROGRESS NOTES
Dx: post tib tendonitis R          Insurance (Authorized # of Visits):   [id number] [facility] Physician: Dr. Talisha Johnston  Next MD visit: none scheduled  Fall Risk: standard         Precautions: n/a             Subjective:   Pt reports pain in the R anteri

## 2020-10-09 ENCOUNTER — OFFICE VISIT (OUTPATIENT)
Dept: FAMILY MEDICINE CLINIC | Facility: CLINIC | Age: 85
End: 2020-10-09
Payer: MEDICARE

## 2020-10-09 ENCOUNTER — OFFICE VISIT (OUTPATIENT)
Dept: PHYSICAL THERAPY | Age: 85
End: 2020-10-09
Attending: PODIATRIST
Payer: MEDICARE

## 2020-10-09 VITALS
DIASTOLIC BLOOD PRESSURE: 66 MMHG | HEART RATE: 88 BPM | HEIGHT: 61 IN | WEIGHT: 187.81 LBS | BODY MASS INDEX: 35.46 KG/M2 | SYSTOLIC BLOOD PRESSURE: 118 MMHG | RESPIRATION RATE: 16 BRPM | TEMPERATURE: 99 F

## 2020-10-09 DIAGNOSIS — I10 ESSENTIAL HYPERTENSION: ICD-10-CM

## 2020-10-09 DIAGNOSIS — Z13.31 DEPRESSION SCREENING: ICD-10-CM

## 2020-10-09 DIAGNOSIS — Z95.1 S/P CABG X 5: ICD-10-CM

## 2020-10-09 DIAGNOSIS — E66.01 SEVERE OBESITY (BMI 35.0-35.9 WITH COMORBIDITY) (HCC): ICD-10-CM

## 2020-10-09 DIAGNOSIS — E03.9 ACQUIRED HYPOTHYROIDISM: ICD-10-CM

## 2020-10-09 DIAGNOSIS — E78.2 MIXED HYPERLIPIDEMIA: ICD-10-CM

## 2020-10-09 DIAGNOSIS — Z00.00 ENCOUNTER FOR ANNUAL HEALTH EXAMINATION: Primary | ICD-10-CM

## 2020-10-09 DIAGNOSIS — I25.10 CORONARY ARTERY DISEASE INVOLVING NATIVE CORONARY ARTERY OF NATIVE HEART WITHOUT ANGINA PECTORIS: ICD-10-CM

## 2020-10-09 DIAGNOSIS — R73.01 ELEVATED FASTING GLUCOSE: ICD-10-CM

## 2020-10-09 DIAGNOSIS — G31.84 MCI (MILD COGNITIVE IMPAIRMENT): ICD-10-CM

## 2020-10-09 PROCEDURE — G0444 DEPRESSION SCREEN ANNUAL: HCPCS | Performed by: FAMILY MEDICINE

## 2020-10-09 PROCEDURE — G0439 PPPS, SUBSEQ VISIT: HCPCS | Performed by: FAMILY MEDICINE

## 2020-10-09 PROCEDURE — 90662 IIV NO PRSV INCREASED AG IM: CPT | Performed by: FAMILY MEDICINE

## 2020-10-09 PROCEDURE — G0008 ADMIN INFLUENZA VIRUS VAC: HCPCS | Performed by: FAMILY MEDICINE

## 2020-10-09 PROCEDURE — 97110 THERAPEUTIC EXERCISES: CPT

## 2020-10-09 NOTE — PROGRESS NOTES
HPI:   Susanna Bassett is a 81year old female who presents for a Medicare Subsequent Annual Wellness visit (Pt already had Initial Annual Wellness). Preventative  Breast: 3/2019. Normal.   Colon: no indication.    Pap: n/a  Immunizations: high dose flu ago.  Social History    Tobacco Use      Smoking status: Former Smoker        Quit date: 1995        Years since quittin.7      Smokeless tobacco: Never Used       Ms. Stefanie De La Rosa already takes aspirin and has it on her medication list.   CAGE Alcohol Take 1 tablet by mouth 3 (three) times a week. •  DM-guaiFENesin ER  MG Oral Tablet 12 Hr, Take 1 tablet by mouth daily. •  ezetimibe 10 MG Oral Tab, Take 1 tablet (10 mg total) by mouth daily.     •  Rosuvastatin Calcium 40 MG Oral Tab, Take BMI 35.48 kg/m²  Estimated body mass index is 35.48 kg/m² as calculated from the following:    Height as of this encounter: 61\". Weight as of this encounter: 187 lb 12.8 oz (85.2 kg).     Medicare Hearing Assessment  (Required for AWV/SWV)    Hearing Sc screening. Its been a year since mammogram.  She is considering. 2. Coronary artery disease involving native coronary artery of native heart without angina pectoris  3. S/P CABG x 5  Stable  Heart does not limit her  Recent cardiac cath this summer. Value   03/21/2014 88          Cardiovascular Disease Screening     LDL Annually LDL Cholesterol (mg/dL)   Date Value   05/22/2020 90     LDL CHOLESTROL (mg/dL)   Date Value   03/21/2014 77        EKG - w/ Initial Preventative Physical Exam only, or if med VIII or IX concentrates   Clients of institutions for the mentally retarded   Persons who live in the same house as a HepB virus carrier   Homosexual men   Illicit injectable drug abusers     Tetanus Toxoid  Only covered with a cut with metal- TD and TDaP

## 2020-10-09 NOTE — PATIENT INSTRUCTIONS
[address] SCREENING SCHEDULE   Tests on this list are recommended by your physician but may not be covered, or covered at this frequency, by your insurer. Please check with your insurance carrier before scheduling to verify coverage.    PREVENTATI criteria:   • Men who are 73-68 years old and have smoked more than 100 cigarettes in their lifetime   • Anyone with a family history    Colorectal Cancer Screening  Covered up to Age 76     Colonoscopy Screen   Covered every 10 years- more often if abnorm regularly   Immunizations      Influenza  Covered Annually Orders placed or performed in visit on 10/09/20   • FLU VACC HIGH DOSE PRSV FREE   Orders placed or performed in visit on 10/08/19   • FLU VACC HIGH DOSE PRSV FREE   Orders placed or performed in v http://[email or web address] idph.state. [place]/public/books/advin.htm  A link to the thereNow. This site has a lot of good information including definitions of the different types of Advance Directives.  It also has the State forms available on it's webs

## 2020-10-09 NOTE — PROGRESS NOTES
Dx: post tib tendonitis R          Insurance (Authorized # of Visits):   8            Authorizing Physician: Dr. Morgan Velez  Next MD visit: none scheduled  Fall Risk: standard         Precautions: n/a             Subjective:   Better today, I was sore yester

## 2020-10-14 ENCOUNTER — OFFICE VISIT (OUTPATIENT)
Dept: PHYSICAL THERAPY | Age: 85
End: 2020-10-14
Attending: PODIATRIST
Payer: MEDICARE

## 2020-10-14 PROCEDURE — 97110 THERAPEUTIC EXERCISES: CPT

## 2020-10-14 NOTE — PROGRESS NOTES
Dx: post tib tendonitis R          Insurance (Authorized # of Visits):   [id number] [facility] Physician: Dr. David Garduno  Next MD visit: none scheduled  Fall Risk: standard         Precautions: n/a             Subjective:  Pt is feeling better.  I had one secs  LTR 10 x       STM R  post tib 10 mins   Cupping 5 mins R   PROM R all planes  Taped with KT to increase arch support  5 mins                                HEP:  None issued- pt to cont with her stretches     Charges:  EX 3        Total Timed Treatm

## 2020-10-16 ENCOUNTER — OFFICE VISIT (OUTPATIENT)
Dept: PHYSICAL THERAPY | Age: 85
End: 2020-10-16
Attending: PODIATRIST
Payer: MEDICARE

## 2020-10-16 PROCEDURE — 97110 THERAPEUTIC EXERCISES: CPT

## 2020-10-16 NOTE — PROGRESS NOTES
Dx: post tib tendonitis R          Insurance (Authorized # of Visits):   [id number] Physician: Dr. Jerald Lofton  Next MD visit: none scheduled  Fall Risk: standard         Precautions: n/a             Subjective:  Pt is feeling better.   Purchased mins     Rocker board A/P 10 x   Toe raises- pain medial arch    Step ups 6 inch pain medial arch     STM post tib 10 mins   PROM R ankle  All planes    GTB 10 x each all planes     Towel scrunches 10 x R     CP 10 mins R                          HEP:  Non

## 2020-10-21 ENCOUNTER — OFFICE VISIT (OUTPATIENT)
Dept: PHYSICAL THERAPY | Age: 85
End: 2020-10-21
Attending: PODIATRIST
Payer: MEDICARE

## 2020-10-21 PROCEDURE — 97110 THERAPEUTIC EXERCISES: CPT

## 2020-10-21 NOTE — PROGRESS NOTES
Dx: post tib tendonitis R          Insurance (Authorized # of Visits):   [facility] Physician: Dr. Shaye Perez  Next MD visit: none scheduled  Fall Risk: standard         Precautions: n/a             Subjective: feeling some discomfort on the me feet together and eyes closed.      Bridge 10 x2   SKTC 3 x 30 secs  LTR 10 x       STM R  post tib 10 mins   Cupping 5 mins R   PROM R all planes  Taped with KT to increase arch support  5 mins       LE only nustep 5 mins     Rocker board A/P 10 x   Toe ra

## 2020-10-23 ENCOUNTER — OFFICE VISIT (OUTPATIENT)
Dept: PHYSICAL THERAPY | Age: 85
End: 2020-10-23
Attending: PODIATRIST
Payer: MEDICARE

## 2020-10-23 PROCEDURE — 97110 THERAPEUTIC EXERCISES: CPT

## 2020-10-23 NOTE — PROGRESS NOTES
Dx: post tib tendonitis R          Insurance (Authorized # of Visits):   [address] Physician: Dr. Xander Gutierrez  Next MD visit: none scheduled  Fall Risk: standard         Precautions: n/a             Subjective:   Pain up and down.  Today not as increase arch support  5 mins       LE only nustep 5 mins     Rocker board A/P 10 x   Toe raises- pain medial arch    Step ups 6 inch pain medial arch     STM post tib 10 mins   PROM R ankle  All planes    GTB 10 x each all planes     Towel scrunches 10 x

## 2020-10-28 ENCOUNTER — APPOINTMENT (OUTPATIENT)
Dept: PHYSICAL THERAPY | Age: 85
End: 2020-10-28
Attending: PODIATRIST
Payer: MEDICARE

## 2020-10-30 ENCOUNTER — OFFICE VISIT (OUTPATIENT)
Dept: PHYSICAL THERAPY | Age: 85
End: 2020-10-30
Attending: PODIATRIST
Payer: MEDICARE

## 2020-10-30 PROCEDURE — 97110 THERAPEUTIC EXERCISES: CPT

## 2020-11-02 NOTE — PROGRESS NOTES
Dx: post tib tendonitis R          Insurance (Authorized # of Visits):   8            Authorizing Physician: Dr. Maral Conrad  Next MD visit: none scheduled  Fall Risk: standard         Precautions: n/a             Subjective:  Pt still in reporting pain in th x 30 secs  STM post tib 10 mins  Taped LT 10 mins increase medial arch   CP at home 10 mins    LE nustep 5 mins     Rocker board A/P 10 x 2   Foam pad balance 10 mins feet together and eyes closed.      Bridge 10 x2   SKTC 3 x 30 secs  LTR 10 x       STM R

## 2020-11-03 ENCOUNTER — OFFICE VISIT (OUTPATIENT)
Dept: PHYSICAL THERAPY | Age: 85
End: 2020-11-03
Attending: FAMILY MEDICINE
Payer: MEDICARE

## 2020-11-03 PROCEDURE — 97110 THERAPEUTIC EXERCISES: CPT

## 2020-11-03 NOTE — PROGRESS NOTES
Dx: post tib tendonitis R          Insurance (Authorized # of Visits):   [id number] [facility] Physician: Dr. Jennifer He  Next MD visit: none scheduled  Fall Risk: standard         Precautions: n/a             Subjective:  Pt has been icing and is still f inch 10 x   Ankle pumps  SKTC / piriformis / HS 2 x 30 secs  STM post tib 10 mins  Taped LT 10 mins increase medial arch   CP at home 10 mins    LE nustep 5 mins     Rocker board A/P 10 x 2   Foam pad balance 10 mins feet together and eyes closed.      Brid

## 2020-11-06 ENCOUNTER — TELEPHONE (OUTPATIENT)
Dept: FAMILY MEDICINE CLINIC | Facility: CLINIC | Age: 85
End: 2020-11-06

## 2020-11-06 NOTE — TELEPHONE ENCOUNTER
Nope - she is no longer a diabetic, so diregard. The computer screening system has a hard time accepting that people can cure their diabeted and no longer need this, so it erroneously flagged it. No worries here.

## 2020-11-06 NOTE — TELEPHONE ENCOUNTER
Patient received a notification that she is needing a diabetic nephropathy screening.  Please place order and advise patient once entered so she can call and schedule

## 2020-11-10 ENCOUNTER — OFFICE VISIT (OUTPATIENT)
Dept: PODIATRY CLINIC | Facility: CLINIC | Age: 85
End: 2020-11-10
Payer: MEDICARE

## 2020-11-10 ENCOUNTER — OFFICE VISIT (OUTPATIENT)
Dept: PHYSICAL THERAPY | Age: 85
End: 2020-11-10
Attending: FAMILY MEDICINE
Payer: MEDICARE

## 2020-11-10 DIAGNOSIS — M76.829 POSTERIOR TIBIAL TENDON DYSFUNCTION: Primary | ICD-10-CM

## 2020-11-10 PROCEDURE — 97110 THERAPEUTIC EXERCISES: CPT

## 2020-11-10 PROCEDURE — L4361 PNEUMA/VAC WALK BOOT PRE OTS: HCPCS | Performed by: PODIATRIST

## 2020-11-10 NOTE — PROGRESS NOTES
Taj Castillo is a 81year old female. No chief complaint on file. HPI:     Patient has pain in her ankle undergoing therapy making it difficult to walk. Allergies: Patient has no known allergies.    Current Outpatient Medications   Medication TOE METATARSAL OPEN REDUCTION INTERNAL FIXATION Left 10/25/2014    Performed by Jaspal Chilel DPM at [facility] MAIN OR   • TONSILLECTOMY  1941   • TOTAL ABDOM HYSTERECTOMY  1985      Family History   Problem Relation Age of Onset   • Heart Disease Brother posterior tibial tendon and into the arch        ASSESSMENT AND PLAN:   Diagnoses and all orders for this visit:    Posterior tibial tendon dysfunction  -     MRI ANKLE, RIGHT (CPT=73721); Future        Plan:  To place her into a cast boot for immobilizatio

## 2020-11-12 ENCOUNTER — OFFICE VISIT (OUTPATIENT)
Dept: PHYSICAL THERAPY | Age: 85
End: 2020-11-12
Attending: FAMILY MEDICINE
Payer: MEDICARE

## 2020-11-12 PROCEDURE — 97110 THERAPEUTIC EXERCISES: CPT

## 2020-11-12 NOTE — PROGRESS NOTES
Dx: post tib tendonitis R          Insurance (Authorized # of Visits):   [id number] [facility] Physician: Dr. Jerald Lofton  Next MD visit: none scheduled  Fall Risk: standard         Precautions: n/a              Subjective:   Pt is still in a lot of pain. STM R  post tib 10 mins   Cupping 5 mins R   PROM R all planes  Taped with KT to increase arch support  5 mins       LE only nustep 5 mins     Rocker board A/P 10 x   Toe raises- pain medial arch    Step ups 6 inch pain medial arch     STM post tib 1 home                                          HEP:  None issued- pt to cont with her stretches     Charges:  EX 2         Total Timed Treatment: 35 min  Total Treatment Time: 35 min pt late was taking care of her

## 2021-01-27 DIAGNOSIS — Z23 NEED FOR VACCINATION: ICD-10-CM

## 2021-02-09 ENCOUNTER — IMMUNIZATION (OUTPATIENT)
Dept: LAB | Age: 86
End: 2021-02-09
Attending: HOSPITALIST
Payer: MEDICARE

## 2021-02-09 DIAGNOSIS — Z23 NEED FOR VACCINATION: Primary | ICD-10-CM

## 2021-02-09 PROCEDURE — 0001A SARSCOV2 VAC 30MCG/0.3ML IM: CPT

## 2021-03-02 ENCOUNTER — IMMUNIZATION (OUTPATIENT)
Dept: LAB | Age: 86
End: 2021-03-02
Attending: HOSPITALIST
Payer: MEDICARE

## 2021-03-02 DIAGNOSIS — Z23 NEED FOR VACCINATION: Primary | ICD-10-CM

## 2021-03-02 PROCEDURE — 0002A SARSCOV2 VAC 30MCG/0.3ML IM: CPT

## 2021-04-20 ENCOUNTER — OFFICE VISIT (OUTPATIENT)
Dept: FAMILY MEDICINE CLINIC | Facility: CLINIC | Age: 86
End: 2021-04-20
Payer: MEDICARE

## 2021-04-20 VITALS
BODY MASS INDEX: 33.76 KG/M2 | HEART RATE: 112 BPM | SYSTOLIC BLOOD PRESSURE: 120 MMHG | TEMPERATURE: 97 F | WEIGHT: 178.81 LBS | HEIGHT: 61 IN | DIASTOLIC BLOOD PRESSURE: 66 MMHG | RESPIRATION RATE: 16 BRPM | OXYGEN SATURATION: 98 %

## 2021-04-20 DIAGNOSIS — I25.10 CORONARY ARTERY DISEASE INVOLVING NATIVE CORONARY ARTERY OF NATIVE HEART WITHOUT ANGINA PECTORIS: ICD-10-CM

## 2021-04-20 DIAGNOSIS — R73.01 ELEVATED FASTING GLUCOSE: ICD-10-CM

## 2021-04-20 DIAGNOSIS — I10 ESSENTIAL HYPERTENSION: ICD-10-CM

## 2021-04-20 DIAGNOSIS — F43.9 STRESS AT HOME: ICD-10-CM

## 2021-04-20 DIAGNOSIS — E78.2 MIXED HYPERLIPIDEMIA: ICD-10-CM

## 2021-04-20 DIAGNOSIS — M76.821 POSTERIOR TIBIAL TENDONITIS, RIGHT: Primary | ICD-10-CM

## 2021-04-20 PROCEDURE — 99215 OFFICE O/P EST HI 40 MIN: CPT | Performed by: FAMILY MEDICINE

## 2021-04-20 NOTE — PROGRESS NOTES
Patient presents with: Follow - Up: 6 Month      HPI:   Charma Crigler is a 81year old female who presents to the office for 6 mo check up. Heart - h/o CAD, CABG, HTN, HLD. On metoprolol, Zetia, Crestor, lasix, ASA 81.   BP controlled    Thyroid - o here.   They have caregiver which is helpful  We discussed his health, the medications they have,  Norco knocks him out, but 1/2 tab helps sleep, so that is probably not a bad idea for the mean time.    It will be an emotional roller coaster over the next s

## 2021-04-21 ENCOUNTER — TELEPHONE (OUTPATIENT)
Dept: ORTHOPEDICS CLINIC | Facility: CLINIC | Age: 86
End: 2021-04-21

## 2021-04-21 NOTE — TELEPHONE ENCOUNTER
Patient is calling wanting to know if Dr. Becky Dalton can squeeze patient in for either [facility] location. Patient will be a new patient, bilateral feet tendonitis and unable to do anything.  Patient cares for her  who is diagnosed with cancer so is a l

## 2021-04-27 ENCOUNTER — OFFICE VISIT (OUTPATIENT)
Dept: ORTHOPEDICS CLINIC | Facility: CLINIC | Age: 86
End: 2021-04-27
Payer: MEDICARE

## 2021-04-27 DIAGNOSIS — M21.071 ACQUIRED BILATERAL VALGUS DEFORMITY OF ANKLES: Primary | ICD-10-CM

## 2021-04-27 DIAGNOSIS — K21.9 GASTROESOPHAGEAL REFLUX DISEASE: ICD-10-CM

## 2021-04-27 DIAGNOSIS — M21.072 ACQUIRED BILATERAL VALGUS DEFORMITY OF ANKLES: Primary | ICD-10-CM

## 2021-04-27 DIAGNOSIS — M76.829 POSTERIOR TIBIAL TENDON DYSFUNCTION: ICD-10-CM

## 2021-04-27 PROCEDURE — L1902 AFO ANKLE GAUNTLET PRE OTS: HCPCS | Performed by: PODIATRIST

## 2021-04-27 PROCEDURE — 99203 OFFICE O/P NEW LOW 30 MIN: CPT | Performed by: PODIATRIST

## 2021-04-27 RX ORDER — PANTOPRAZOLE SODIUM 40 MG/1
TABLET, DELAYED RELEASE ORAL
Qty: 90 TABLET | Refills: 3 | Status: SHIPPED | OUTPATIENT
Start: 2021-04-27

## 2021-04-27 NOTE — PROGRESS NOTES
[facility] New Patient Note    CC: Patient presents with: Foot Pain: Patient is here today due to having right foot/ankle pain. Patient states that the pain has been going on since September of 2020.        HPI: The patient is a 81year old fem Vitamin (ONE-DAILY MULTI VITAMINS) Oral Tab Take 1 tablet by mouth 3 (three) times a week. • DM-guaiFENesin ER  MG Oral Tablet 12 Hr Take 1 tablet by mouth daily. • ezetimibe 10 MG Oral Tab Take 1 tablet (10 mg total) by mouth daily.  90 tab time  Rec'd AFO  mohamud would be difficult but helpful  I would not recommend surgery at this time and maybe not at all. May be too hard on her and she is watching her  and caring for him.   Will send to [address] [place] [address] or [facility] for AFO  Cabrera s

## 2021-05-01 ENCOUNTER — MED REC SCAN ONLY (OUTPATIENT)
Dept: ORTHOPEDICS CLINIC | Facility: CLINIC | Age: 86
End: 2021-05-01

## 2021-05-11 ENCOUNTER — OFFICE VISIT (OUTPATIENT)
Dept: ORTHOPEDICS CLINIC | Facility: CLINIC | Age: 86
End: 2021-05-11
Payer: MEDICARE

## 2021-05-11 DIAGNOSIS — M76.822 POSTERIOR TIBIAL TENDON DYSFUNCTION (PTTD) OF BOTH LOWER EXTREMITIES: Primary | ICD-10-CM

## 2021-05-11 DIAGNOSIS — M76.821 POSTERIOR TIBIAL TENDON DYSFUNCTION (PTTD) OF BOTH LOWER EXTREMITIES: Primary | ICD-10-CM

## 2021-05-11 DIAGNOSIS — M76.821 POSTERIOR TIBIALIS TENDINITIS OF BOTH LOWER EXTREMITIES: ICD-10-CM

## 2021-05-11 DIAGNOSIS — M76.822 POSTERIOR TIBIALIS TENDINITIS OF BOTH LOWER EXTREMITIES: ICD-10-CM

## 2021-05-11 PROCEDURE — 99213 OFFICE O/P EST LOW 20 MIN: CPT | Performed by: PODIATRIST

## 2021-05-11 NOTE — PROGRESS NOTES
[facility] Progress Note    Subjective:   Pt here for follow up ankle braces for mohamud PT dysfunction  Still having some pain  Recent fall at home reinjured left ankle      Objective:   Minimal swelling today  Mild tenderness at atf ligament left an

## 2021-05-17 ENCOUNTER — TELEPHONE (OUTPATIENT)
Dept: PHYSICAL THERAPY | Facility: HOSPITAL | Age: 86
End: 2021-05-17

## 2021-05-27 ENCOUNTER — OFFICE VISIT (OUTPATIENT)
Dept: PHYSICAL THERAPY | Age: 86
End: 2021-05-27
Attending: PODIATRIST
Payer: MEDICARE

## 2021-05-27 PROCEDURE — 97161 PT EVAL LOW COMPLEX 20 MIN: CPT

## 2021-05-27 PROCEDURE — 97035 APP MDLTY 1+ULTRASOUND EA 15: CPT

## 2021-05-30 NOTE — PROGRESS NOTES
LOWER EXTREMITY EVALUATION:   Referring Physician: Dr. Leonie Hammond  Diagnosis: B post tib tendon dysfunction Date of Service: 5/30/2021     PATIENT SUMMARY   Anny Acosta is a 81year old female who presents to therapy today with complaints of   Pt descri Walking,. Signs and symptoms are consistent with diagnosis of B tendon post tib dysfunction. Pt and PT discussed evaluation findings, pathology, POC and HEP. Pt voiced understanding and performs HEP correctly without reported pain.  Skilled Physical Thera visits)  1. Pt to be I with her stretches and HEP  2. Pain decreased to 2/10 by the end of the day   3. Pt to ambulate in the house with braces donned with pain 3/10  4.  Increase SL balance to 15 seconds B to decrease risk of falling     Frequency / Indigo Workman

## 2021-06-23 ENCOUNTER — OFFICE VISIT (OUTPATIENT)
Dept: FAMILY MEDICINE CLINIC | Facility: CLINIC | Age: 86
End: 2021-06-23
Payer: MEDICARE

## 2021-06-23 VITALS
SYSTOLIC BLOOD PRESSURE: 120 MMHG | RESPIRATION RATE: 16 BRPM | HEART RATE: 80 BPM | WEIGHT: 180 LBS | HEIGHT: 61 IN | DIASTOLIC BLOOD PRESSURE: 68 MMHG | TEMPERATURE: 98 F | BODY MASS INDEX: 33.99 KG/M2

## 2021-06-23 DIAGNOSIS — H10.33 ACUTE BACTERIAL CONJUNCTIVITIS OF BOTH EYES: ICD-10-CM

## 2021-06-23 DIAGNOSIS — R05.9 COUGH: ICD-10-CM

## 2021-06-23 DIAGNOSIS — Z63.4 BEREAVEMENT: Primary | ICD-10-CM

## 2021-06-23 PROCEDURE — 99214 OFFICE O/P EST MOD 30 MIN: CPT | Performed by: PHYSICIAN ASSISTANT

## 2021-06-23 RX ORDER — POLYMYXIN B SULFATE AND TRIMETHOPRIM 1; 10000 MG/ML; [USP'U]/ML
1 SOLUTION OPHTHALMIC EVERY 4 HOURS
Qty: 10 ML | Refills: 0 | Status: SHIPPED | OUTPATIENT
Start: 2021-06-23 | End: 2021-10-25

## 2021-06-23 SDOH — SOCIAL STABILITY - SOCIAL INSECURITY: DISSAPEARANCE AND DEATH OF FAMILY MEMBER: Z63.4

## 2021-06-24 ENCOUNTER — OFFICE VISIT (OUTPATIENT)
Dept: PHYSICAL THERAPY | Age: 86
End: 2021-06-24
Attending: PODIATRIST
Payer: MEDICARE

## 2021-06-24 PROCEDURE — 97110 THERAPEUTIC EXERCISES: CPT

## 2021-06-24 NOTE — PROGRESS NOTES
Dx: B post tib tendon dysfunction      Insurance (Authorized # of Visits):   8     Authorizing Physician: Dr. Dru Eastman  Next MD visit: none scheduled  Fall Risk: standard         Precautions: n/a             Subjective: drove over today for the first time.

## 2021-06-27 NOTE — PROGRESS NOTES
Patient presents with:  Eye Problem: possible pink eye, both eyes  Cough: x 2 weeks, yellow mucus        HISTORY OF PRESENT ILLNESS  Lata Castillo is a 81year old female who presents for evaluation of pink eye.  Notes that she lost her  at the beg fascial fibromatosis     Urge incontinence     Hypertension     S/P CABG x 5     Bursitis of shoulder, right     Knee bursitis, left     CAD (coronary artery disease)     Lower extremity edema     Elevated fasting glucose      Past Surgical History:   Proc drainage. Follow up if persistent or worsening. 2. Cough  Lung clear. Suspect viral. Supportive cares. Follow up for persistent or worsening sxs. 3. Bereavement  Encouraged her to talk with her friends and family.  Encouraged her to reach out here if

## 2021-06-29 ENCOUNTER — APPOINTMENT (OUTPATIENT)
Dept: PHYSICAL THERAPY | Age: 86
End: 2021-06-29
Attending: PODIATRIST
Payer: MEDICARE

## 2021-06-29 ENCOUNTER — OFFICE VISIT (OUTPATIENT)
Dept: PHYSICAL THERAPY | Age: 86
End: 2021-06-29
Attending: PODIATRIST
Payer: MEDICARE

## 2021-06-29 PROCEDURE — 97110 THERAPEUTIC EXERCISES: CPT

## 2021-06-29 NOTE — PROGRESS NOTES
Dx: B post tib tendon dysfunction      Insurance (Authorized # of Visits):   8     Authorizing Physician: Dr. Rosemary Martins  Next MD visit: none scheduled  Fall Risk: standard         Precautions: n/a             Subjective: R knee sore yesterday.  Still feeling

## 2021-07-01 ENCOUNTER — APPOINTMENT (OUTPATIENT)
Dept: PHYSICAL THERAPY | Age: 86
End: 2021-07-01
Attending: PODIATRIST
Payer: MEDICARE

## 2021-07-06 ENCOUNTER — OFFICE VISIT (OUTPATIENT)
Dept: PHYSICAL THERAPY | Age: 86
End: 2021-07-06
Attending: PODIATRIST
Payer: MEDICARE

## 2021-07-06 PROCEDURE — 97110 THERAPEUTIC EXERCISES: CPT

## 2021-07-06 NOTE — PROGRESS NOTES
Dx: B post tib tendon dysfunction      Insurance (Authorized # of Visits):   8     Authorizing Physician: Dr. Zully Leon  Next MD visit: none scheduled  Fall Risk: standard         Precautions: n/a             Subjective:  R ankle sore around the outside of t

## 2021-07-08 ENCOUNTER — OFFICE VISIT (OUTPATIENT)
Dept: PHYSICAL THERAPY | Age: 86
End: 2021-07-08
Attending: PODIATRIST
Payer: MEDICARE

## 2021-07-08 PROCEDURE — 97110 THERAPEUTIC EXERCISES: CPT

## 2021-07-08 NOTE — PROGRESS NOTES
Dx: B post tib tendon dysfunction      Insurance (Authorized # of Visits):   8     Authorizing Physician: Dr. Tobi Negron  Next MD visit: none scheduled  Fall Risk: standard         Precautions: n/a             Subjective:   R ankle lateral is sore in the am. mins LE only  Rocker board 10 x 2   HHA    Lateral side walking in bars     STM R lateral ankle 5 mins  GTB R DF/EV 10 x each   US 7 mins R lateral ankle                                  HEP:  Stretches for home     Charges:  EX 3       Total Timed Treatme

## 2021-07-13 ENCOUNTER — OFFICE VISIT (OUTPATIENT)
Dept: PHYSICAL THERAPY | Age: 86
End: 2021-07-13
Attending: PODIATRIST
Payer: MEDICARE

## 2021-07-13 PROCEDURE — 97110 THERAPEUTIC EXERCISES: CPT

## 2021-07-13 NOTE — PROGRESS NOTES
Dx: B post tib tendon dysfunction      Insurance (Authorized # of Visits):   8     Authorizing Physician: Dr. Rhonda Barba  Next MD visit: none scheduled  Fall Risk: standard         Precautions: n/a             Subjective:   L pain - 1/10. R side 1/10.  Sorenes in bars     STM R lateral ankle 5 mins  GTB R DF/EV 10 x each   US 7 mins R lateral ankle          nustep 5 mins   Rocker board 10 x   M/L and A/P     Lateral side walking RTB in bars  PF in bars 10 x 2     PROM R ankle- US to  the post tib  YTB 10 x flex

## 2021-07-15 ENCOUNTER — OFFICE VISIT (OUTPATIENT)
Dept: PHYSICAL THERAPY | Age: 86
End: 2021-07-15
Attending: PODIATRIST
Payer: MEDICARE

## 2021-07-15 PROCEDURE — 97110 THERAPEUTIC EXERCISES: CPT

## 2021-07-15 NOTE — PROGRESS NOTES
Dx: B post tib tendon dysfunction      Insurance (Authorized # of Visits):   8     Authorizing Physician: Dr. Evi Cornelius  Next MD visit: none scheduled  Fall Risk: standard         Precautions: n/a             Subjective:   R lateral ankle pain 2/10, knee mukund mins  GTB R DF/EV 10 x each   US 7 mins R lateral ankle          nustep 5 mins   Rocker board 10 x   M/L and A/P     Lateral side walking RTB in bars  PF in bars 10 x 2     PROM R ankle- US to  the post tib  YTB 10 x flex and ext      nustep 5 mins   Rocke

## 2021-07-22 ENCOUNTER — OFFICE VISIT (OUTPATIENT)
Dept: PHYSICAL THERAPY | Age: 86
End: 2021-07-22
Attending: PODIATRIST
Payer: MEDICARE

## 2021-07-22 PROCEDURE — 97110 THERAPEUTIC EXERCISES: CPT

## 2021-07-22 NOTE — PROGRESS NOTES
Dx: B post tib tendon dysfunction      Insurance (Authorized # of Visits):   8     Authorizing Physician: Dr. Kallie Alcantar  Next MD visit: none scheduled  Fall Risk: standard         Precautions: n/a             Subjective:  I  Think my L foot is healed the rig DF/EV 10 x each   US 7 mins R lateral ankle          nustep 5 mins   Rocker board 10 x   M/L and A/P     Lateral side walking RTB in bars  PF in bars 10 x 2     PROM R ankle- US to  the post tib  YTB 10 x flex and ext      nustep 5 mins   Rocker board 10 x

## 2021-07-27 ENCOUNTER — OFFICE VISIT (OUTPATIENT)
Dept: PHYSICAL THERAPY | Age: 86
End: 2021-07-27
Attending: PODIATRIST
Payer: MEDICARE

## 2021-07-27 PROCEDURE — 97110 THERAPEUTIC EXERCISES: CPT

## 2021-07-27 NOTE — PROGRESS NOTES
Dx: B post tib tendon dysfunction      Insurance (Authorized # of Visits):   8     Authorizing Physician: Dr. Kallie Alcantar  Next MD visit: none scheduled  Fall Risk: standard         Precautions: n/a             Subjective:  I was really sore after the last otoniel toes on the R   RTB DF/EV 10   X 2  nustep 5 mins LE only  Rocker board 10 x 2   HHA    Lateral side walking in bars     STM R lateral ankle 5 mins  GTB R DF/EV 10 x each   US 7 mins R lateral ankle          nustep 5 mins   Rocker board 10 x   M/L and A/P

## 2021-07-30 ENCOUNTER — OFFICE VISIT (OUTPATIENT)
Dept: PHYSICAL THERAPY | Age: 86
End: 2021-07-30
Attending: PODIATRIST
Payer: MEDICARE

## 2021-07-30 PROCEDURE — 97110 THERAPEUTIC EXERCISES: CPT

## 2021-07-30 NOTE — PROGRESS NOTES
Dx: B post tib tendon dysfunction      Insurance (Authorized # of Visits):   8     Authorizing Physician: Dr. Lucy Olivo  Next MD visit: none scheduled  Fall Risk: standard         Precautions: n/a            progress Summary  Pt has attended 10  visits in  Standing gastroc stretch 2 x 20 secs   On wall and bars   nustep 5 mins   PF 10 x in bars with HHA  Rocker board 10 x   Lateral side walking   in bars with HHA  RTB 10 x 2 DF and toes on the R   RTB DF/EV 10   X 2  nustep 5 mins LE only  Rocker board 10

## 2021-08-03 ENCOUNTER — HOSPITAL ENCOUNTER (INPATIENT)
Facility: HOSPITAL | Age: 86
LOS: 1 days | Discharge: HOME | DRG: 690 | End: 2021-08-05
Attending: EMERGENCY MEDICINE | Admitting: HOSPITALIST
Payer: MEDICARE

## 2021-08-03 ENCOUNTER — TELEPHONE (OUTPATIENT)
Dept: FAMILY MEDICINE CLINIC | Facility: CLINIC | Age: 86
End: 2021-08-03

## 2021-08-03 ENCOUNTER — APPOINTMENT (OUTPATIENT)
Dept: CT IMAGING | Facility: HOSPITAL | Age: 86
DRG: 690 | End: 2021-08-03
Payer: MEDICARE

## 2021-08-03 ENCOUNTER — HOSPITAL ENCOUNTER (OUTPATIENT)
Age: 86
Discharge: ANOTHER HOSPITAL | DRG: 690 | End: 2021-08-03
Attending: EMERGENCY MEDICINE
Payer: MEDICARE

## 2021-08-03 ENCOUNTER — APPOINTMENT (OUTPATIENT)
Dept: GENERAL RADIOLOGY | Age: 86
DRG: 690 | End: 2021-08-03
Attending: EMERGENCY MEDICINE
Payer: MEDICARE

## 2021-08-03 ENCOUNTER — APPOINTMENT (OUTPATIENT)
Dept: GENERAL RADIOLOGY | Facility: HOSPITAL | Age: 86
DRG: 690 | End: 2021-08-03
Attending: EMERGENCY MEDICINE
Payer: MEDICARE

## 2021-08-03 VITALS
BODY MASS INDEX: 33 KG/M2 | OXYGEN SATURATION: 96 % | HEART RATE: 82 BPM | TEMPERATURE: 98 F | RESPIRATION RATE: 17 BRPM | SYSTOLIC BLOOD PRESSURE: 124 MMHG | DIASTOLIC BLOOD PRESSURE: 58 MMHG | WEIGHT: 174 LBS

## 2021-08-03 DIAGNOSIS — R35.0 URINARY FREQUENCY: Primary | ICD-10-CM

## 2021-08-03 DIAGNOSIS — N30.00 ACUTE CYSTITIS WITHOUT HEMATURIA: Primary | ICD-10-CM

## 2021-08-03 DIAGNOSIS — R41.82 ALTERED MENTAL STATUS, UNSPECIFIED ALTERED MENTAL STATUS TYPE: ICD-10-CM

## 2021-08-03 DIAGNOSIS — A41.9 SEPSIS SECONDARY TO UTI (HCC): ICD-10-CM

## 2021-08-03 DIAGNOSIS — N39.0 SEPSIS SECONDARY TO UTI (HCC): ICD-10-CM

## 2021-08-03 DIAGNOSIS — R29.6 FALLING: Primary | ICD-10-CM

## 2021-08-03 LAB
#MXD IC: 0.6 X10ˆ3/UL (ref 0.1–1)
ALBUMIN SERPL-MCNC: 3.5 G/DL (ref 3.4–5)
ALBUMIN/GLOB SERPL: 1 {RATIO} (ref 1–2)
ALP LIVER SERPL-CCNC: 74 U/L
ALT SERPL-CCNC: 22 U/L
ANION GAP SERPL CALC-SCNC: 7 MMOL/L (ref 0–18)
AST SERPL-CCNC: 16 U/L (ref 15–37)
BASOPHILS # BLD AUTO: 0.04 X10(3) UL (ref 0–0.2)
BASOPHILS NFR BLD AUTO: 0.3 %
BILIRUB SERPL-MCNC: 0.6 MG/DL (ref 0.1–2)
BILIRUB UR QL STRIP.AUTO: NEGATIVE
BUN BLD-MCNC: 22 MG/DL (ref 7–18)
CALCIUM BLD-MCNC: 9.3 MG/DL (ref 8.5–10.1)
CHLORIDE SERPL-SCNC: 104 MMOL/L (ref 98–112)
CLARITY UR REFRACT.AUTO: CLEAR
CO2 SERPL-SCNC: 26 MMOL/L (ref 21–32)
CREAT BLD-MCNC: 1.3 MG/DL
CREAT BLD-MCNC: 1.39 MG/DL
EOSINOPHIL # BLD AUTO: 0 X10(3) UL (ref 0–0.7)
EOSINOPHIL NFR BLD AUTO: 0 %
ERYTHROCYTE [DISTWIDTH] IN BLOOD BY AUTOMATED COUNT: 13.4 %
GLOBULIN PLAS-MCNC: 3.6 G/DL (ref 2.8–4.4)
GLUCOSE BLD-MCNC: 114 MG/DL (ref 70–99)
GLUCOSE BLD-MCNC: 97 MG/DL (ref 70–99)
GLUCOSE UR STRIP.AUTO-MCNC: NEGATIVE MG/DL
HCT VFR BLD AUTO: 43 %
HCT VFR BLD AUTO: 43.4 %
HGB BLD-MCNC: 14.1 G/DL
HGB BLD-MCNC: 14.4 G/DL
IMM GRANULOCYTES # BLD AUTO: 0.05 X10(3) UL (ref 0–1)
IMM GRANULOCYTES NFR BLD: 0.4 %
ISTAT BUN: 23 MG/DL (ref 7–18)
ISTAT CHLORIDE: 100 MMOL/L (ref 98–112)
ISTAT HEMATOCRIT: 44 %
ISTAT IONIZED CALCIUM FOR CHEM 8: 1.23 MMOL/L (ref 1.12–1.32)
ISTAT POTASSIUM: 3.6 MMOL/L (ref 3.6–5.1)
ISTAT SODIUM: 136 MMOL/L (ref 136–145)
ISTAT TCO2: 23 MMOL/L (ref 21–32)
KETONES UR STRIP.AUTO-MCNC: NEGATIVE MG/DL
LACTATE SERPL-SCNC: 1.3 MMOL/L (ref 0.4–2)
LYMPHOCYTES # BLD AUTO: 1.6 X10ˆ3/UL (ref 1–4)
LYMPHOCYTES # BLD AUTO: 1.98 X10(3) UL (ref 1–4)
LYMPHOCYTES NFR BLD AUTO: 11.6 %
LYMPHOCYTES NFR BLD AUTO: 14.4 %
M PROTEIN MFR SERPL ELPH: 7.1 G/DL (ref 6.4–8.2)
MCH RBC QN AUTO: 30 PG (ref 26–34)
MCH RBC QN AUTO: 30 PG (ref 26–34)
MCHC RBC AUTO-ENTMCNC: 32.8 G/DL (ref 31–37)
MCHC RBC AUTO-ENTMCNC: 33.2 G/DL (ref 31–37)
MCV RBC AUTO: 90.4 FL
MCV RBC AUTO: 91.5 FL (ref 80–100)
MIXED CELL %: 4.7 %
MONOCYTES # BLD AUTO: 0.63 X10(3) UL (ref 0.1–1)
MONOCYTES NFR BLD AUTO: 4.6 %
NEUTROPHILS # BLD AUTO: 11.08 X10 (3) UL (ref 1.5–7.7)
NEUTROPHILS # BLD AUTO: 11.08 X10(3) UL (ref 1.5–7.7)
NEUTROPHILS # BLD AUTO: 11.2 X10ˆ3/UL (ref 1.5–7.7)
NEUTROPHILS NFR BLD AUTO: 80.3 %
NEUTROPHILS NFR BLD AUTO: 83.7 %
NITRITE UR QL STRIP.AUTO: NEGATIVE
OSMOLALITY SERPL CALC.SUM OF ELEC: 287 MOSM/KG (ref 275–295)
PH UR STRIP.AUTO: 6 [PH] (ref 5–8)
PLATELET # BLD AUTO: 230 10(3)UL (ref 150–450)
PLATELET # BLD AUTO: 252 X10ˆ3/UL (ref 150–450)
POCT BILIRUBIN URINE: NEGATIVE
POCT GLUCOSE URINE: NEGATIVE MG/DL
POCT KETONE URINE: NEGATIVE MG/DL
POCT LEUKOCYTE ESTERASE URINE: NEGATIVE
POCT NITRITE URINE: NEGATIVE
POCT PH URINE: 5.5 (ref 5–8)
POCT PROTEIN URINE: 30 MG/DL
POCT SPECIFIC GRAVITY URINE: 1.02
POCT UROBILINOGEN URINE: 0.2 MG/DL
POTASSIUM SERPL-SCNC: 3.8 MMOL/L (ref 3.5–5.1)
PROCALCITONIN SERPL-MCNC: 2.41 NG/ML (ref ?–0.16)
PROT UR STRIP.AUTO-MCNC: NEGATIVE MG/DL
RBC # BLD AUTO: 4.7 X10ˆ6/UL
RBC # BLD AUTO: 4.8 X10(6)UL
SARS-COV-2 RNA RESP QL NAA+PROBE: NOT DETECTED
SODIUM SERPL-SCNC: 137 MMOL/L (ref 136–145)
SP GR UR STRIP.AUTO: 1 (ref 1–1.03)
TROPONIN I SERPL-MCNC: <0.045 NG/ML (ref ?–0.04)
UROBILINOGEN UR STRIP.AUTO-MCNC: <2 MG/DL
WBC # BLD AUTO: 13.4 X10ˆ3/UL (ref 4–11)
WBC # BLD AUTO: 13.8 X10(3) UL (ref 4–11)

## 2021-08-03 PROCEDURE — 87086 URINE CULTURE/COLONY COUNT: CPT | Performed by: EMERGENCY MEDICINE

## 2021-08-03 PROCEDURE — 71045 X-RAY EXAM CHEST 1 VIEW: CPT | Performed by: EMERGENCY MEDICINE

## 2021-08-03 PROCEDURE — 80047 BASIC METABLC PNL IONIZED CA: CPT

## 2021-08-03 PROCEDURE — 99223 1ST HOSP IP/OBS HIGH 75: CPT | Performed by: INTERNAL MEDICINE

## 2021-08-03 PROCEDURE — 93010 ELECTROCARDIOGRAM REPORT: CPT

## 2021-08-03 PROCEDURE — 99221 1ST HOSP IP/OBS SF/LOW 40: CPT | Performed by: NURSE PRACTITIONER

## 2021-08-03 PROCEDURE — 70450 CT HEAD/BRAIN W/O DYE: CPT

## 2021-08-03 PROCEDURE — 93005 ELECTROCARDIOGRAM TRACING: CPT

## 2021-08-03 PROCEDURE — 85025 COMPLETE CBC W/AUTO DIFF WBC: CPT | Performed by: EMERGENCY MEDICINE

## 2021-08-03 PROCEDURE — 99215 OFFICE O/P EST HI 40 MIN: CPT

## 2021-08-03 PROCEDURE — 81002 URINALYSIS NONAUTO W/O SCOPE: CPT | Performed by: EMERGENCY MEDICINE

## 2021-08-03 PROCEDURE — 71046 X-RAY EXAM CHEST 2 VIEWS: CPT | Performed by: EMERGENCY MEDICINE

## 2021-08-03 PROCEDURE — 36415 COLL VENOUS BLD VENIPUNCTURE: CPT

## 2021-08-03 NOTE — TELEPHONE ENCOUNTER
Called and talked to patient son and told him if she has been falling she needs to be seen at urgent care so they will take them to the [facility]

## 2021-08-03 NOTE — TELEPHONE ENCOUNTER
Pt has been having uti sx since Monday. Daughter in law states she has had urinary frequency, confusion and tired. No appt available this week. Routing to triage.

## 2021-08-03 NOTE — TELEPHONE ENCOUNTER
This AM noticed she was confused and had nauseous and a urinary accident they feel she has a UTI. Talked to the son and and they could get sample and take it to the lab.

## 2021-08-03 NOTE — ED PROVIDER NOTES
Patient Seen in: [facility]      History   Patient presents with:  Urinary Symptoms    Stated Complaint: UTI    HPI/Subjective:   HPI    81-year-old woman with a history of CAD/CABG, hypertension, and hypothyroidism who comes into the imm Use      Vaping Use: Never used    Alcohol use: Not Currently      Alcohol/week: 0.0 - 1.0 standard drinks      Comment: 1 per month    Drug use: No             Review of Systems    Positive for stated complaint: UTI  Other systems are as noted in HPI.   Co EKG Report.   Rate: 79  Rhythm: Sinus Rhythm  Reading: Right bundle branch block and inferior Q waves that were seen on 4/20/2018             Chest x-ray upon my review shows COPD changes and no pulmonary edema     Creatinine is 1.3 which is consistent with

## 2021-08-03 NOTE — TELEPHONE ENCOUNTER
Called and talked to son and he will go to lab at [address] and book to get the urine cup and hat.  Then take the sample back to the lab

## 2021-08-03 NOTE — TELEPHONE ENCOUNTER
Wife says that mom has fallen three times fever now and just not feeling well she's dizzy and has a headache.  I did instruct her that t

## 2021-08-04 ENCOUNTER — APPOINTMENT (OUTPATIENT)
Dept: PHYSICAL THERAPY | Age: 86
End: 2021-08-04
Attending: PODIATRIST
Payer: MEDICARE

## 2021-08-04 PROBLEM — A41.9 SEPSIS SECONDARY TO UTI (HCC): Status: ACTIVE | Noted: 2021-08-04

## 2021-08-04 PROBLEM — N39.0 SEPSIS SECONDARY TO UTI: Status: ACTIVE | Noted: 2021-08-04

## 2021-08-04 PROBLEM — N39.0 SEPSIS SECONDARY TO UTI  (HCC): Status: ACTIVE | Noted: 2021-08-04

## 2021-08-04 PROBLEM — A41.9 SEPSIS SECONDARY TO UTI  (HCC): Status: ACTIVE | Noted: 2021-08-04

## 2021-08-04 PROBLEM — N39.0 SEPSIS SECONDARY TO UTI (HCC): Status: ACTIVE | Noted: 2021-08-04

## 2021-08-04 PROBLEM — A41.9 SEPSIS SECONDARY TO UTI: Status: ACTIVE | Noted: 2021-08-04

## 2021-08-04 LAB
ANION GAP SERPL CALC-SCNC: 5 MMOL/L (ref 0–18)
ATRIAL RATE: 75 BPM
ATRIAL RATE: 79 BPM
BASOPHILS # BLD AUTO: 0.02 X10(3) UL (ref 0–0.2)
BASOPHILS NFR BLD AUTO: 0.2 %
BUN BLD-MCNC: 19 MG/DL (ref 7–18)
CALCIUM BLD-MCNC: 8.8 MG/DL (ref 8.5–10.1)
CHLORIDE SERPL-SCNC: 109 MMOL/L (ref 98–112)
CO2 SERPL-SCNC: 25 MMOL/L (ref 21–32)
CREAT BLD-MCNC: 1.08 MG/DL
EOSINOPHIL # BLD AUTO: 0.06 X10(3) UL (ref 0–0.7)
EOSINOPHIL NFR BLD AUTO: 0.7 %
ERYTHROCYTE [DISTWIDTH] IN BLOOD BY AUTOMATED COUNT: 13.4 %
GLUCOSE BLD-MCNC: 83 MG/DL (ref 70–99)
GLUCOSE BLD-MCNC: 91 MG/DL (ref 70–99)
HCT VFR BLD AUTO: 40 %
HGB BLD-MCNC: 13 G/DL
IMM GRANULOCYTES # BLD AUTO: 0.03 X10(3) UL (ref 0–1)
IMM GRANULOCYTES NFR BLD: 0.3 %
LYMPHOCYTES # BLD AUTO: 2.09 X10(3) UL (ref 1–4)
LYMPHOCYTES NFR BLD AUTO: 23.2 %
MCH RBC QN AUTO: 30 PG (ref 26–34)
MCHC RBC AUTO-ENTMCNC: 32.5 G/DL (ref 31–37)
MCV RBC AUTO: 92.2 FL
MONOCYTES # BLD AUTO: 0.39 X10(3) UL (ref 0.1–1)
MONOCYTES NFR BLD AUTO: 4.3 %
NEUTROPHILS # BLD AUTO: 6.41 X10 (3) UL (ref 1.5–7.7)
NEUTROPHILS # BLD AUTO: 6.41 X10(3) UL (ref 1.5–7.7)
NEUTROPHILS NFR BLD AUTO: 71.3 %
OSMOLALITY SERPL CALC.SUM OF ELEC: 289 MOSM/KG (ref 275–295)
P AXIS: 68 DEGREES
P AXIS: 68 DEGREES
P-R INTERVAL: 176 MS
P-R INTERVAL: 186 MS
PLATELET # BLD AUTO: 213 10(3)UL (ref 150–450)
POTASSIUM SERPL-SCNC: 3.4 MMOL/L (ref 3.5–5.1)
Q-T INTERVAL: 414 MS
Q-T INTERVAL: 442 MS
QRS DURATION: 136 MS
QRS DURATION: 140 MS
QTC CALCULATION (BEZET): 474 MS
QTC CALCULATION (BEZET): 493 MS
R AXIS: -17 DEGREES
R AXIS: -23 DEGREES
RBC # BLD AUTO: 4.34 X10(6)UL
SODIUM SERPL-SCNC: 139 MMOL/L (ref 136–145)
T AXIS: 24 DEGREES
T AXIS: 44 DEGREES
VENTRICULAR RATE: 75 BPM
VENTRICULAR RATE: 79 BPM
WBC # BLD AUTO: 9 X10(3) UL (ref 4–11)

## 2021-08-04 PROCEDURE — 99232 SBSQ HOSP IP/OBS MODERATE 35: CPT | Performed by: HOSPITALIST

## 2021-08-04 RX ORDER — SODIUM CHLORIDE 9 MG/ML
INJECTION, SOLUTION INTRAVENOUS CONTINUOUS
Status: DISCONTINUED | OUTPATIENT
Start: 2021-08-04 | End: 2021-08-05

## 2021-08-04 RX ORDER — SODIUM CHLORIDE 9 MG/ML
INJECTION, SOLUTION INTRAVENOUS CONTINUOUS
Status: DISCONTINUED | OUTPATIENT
Start: 2021-08-04 | End: 2021-08-04

## 2021-08-04 RX ORDER — KETOROLAC TROMETHAMINE 15 MG/ML
15 INJECTION, SOLUTION INTRAMUSCULAR; INTRAVENOUS ONCE
Status: DISCONTINUED | OUTPATIENT
Start: 2021-08-04 | End: 2021-08-05

## 2021-08-04 RX ORDER — EZETIMIBE 10 MG/1
10 TABLET ORAL DAILY
Status: DISCONTINUED | OUTPATIENT
Start: 2021-08-04 | End: 2021-08-05

## 2021-08-04 RX ORDER — ROSUVASTATIN CALCIUM 20 MG/1
40 TABLET, COATED ORAL DAILY
Status: DISCONTINUED | OUTPATIENT
Start: 2021-08-04 | End: 2021-08-05

## 2021-08-04 RX ORDER — ACETAMINOPHEN 325 MG/1
650 TABLET ORAL EVERY 6 HOURS PRN
Status: DISCONTINUED | OUTPATIENT
Start: 2021-08-04 | End: 2021-08-04

## 2021-08-04 RX ORDER — POTASSIUM CHLORIDE 20 MEQ/1
40 TABLET, EXTENDED RELEASE ORAL ONCE
Status: COMPLETED | OUTPATIENT
Start: 2021-08-04 | End: 2021-08-04

## 2021-08-04 RX ORDER — PHENAZOPYRIDINE HYDROCHLORIDE 100 MG/1
100 TABLET, FILM COATED ORAL
Status: DISCONTINUED | OUTPATIENT
Start: 2021-08-04 | End: 2021-08-05

## 2021-08-04 RX ORDER — METOCLOPRAMIDE HYDROCHLORIDE 5 MG/ML
5 INJECTION INTRAMUSCULAR; INTRAVENOUS EVERY 8 HOURS PRN
Status: DISCONTINUED | OUTPATIENT
Start: 2021-08-04 | End: 2021-08-05

## 2021-08-04 RX ORDER — POLYETHYLENE GLYCOL 3350 17 G/17G
17 POWDER, FOR SOLUTION ORAL DAILY PRN
Status: DISCONTINUED | OUTPATIENT
Start: 2021-08-04 | End: 2021-08-05

## 2021-08-04 RX ORDER — ONDANSETRON 2 MG/ML
4 INJECTION INTRAMUSCULAR; INTRAVENOUS EVERY 6 HOURS PRN
Status: DISCONTINUED | OUTPATIENT
Start: 2021-08-04 | End: 2021-08-05

## 2021-08-04 RX ORDER — POTASSIUM CHLORIDE 20 MEQ/1
40 TABLET, EXTENDED RELEASE ORAL ONCE
Status: DISCONTINUED | OUTPATIENT
Start: 2021-08-04 | End: 2021-08-04

## 2021-08-04 RX ORDER — LEVOTHYROXINE SODIUM 88 UG/1
88 TABLET ORAL
Status: DISCONTINUED | OUTPATIENT
Start: 2021-08-04 | End: 2021-08-05

## 2021-08-04 RX ORDER — ACETAMINOPHEN 500 MG
1000 TABLET ORAL EVERY 6 HOURS PRN
Status: DISCONTINUED | OUTPATIENT
Start: 2021-08-04 | End: 2021-08-05

## 2021-08-04 RX ORDER — PANTOPRAZOLE SODIUM 40 MG/1
40 TABLET, DELAYED RELEASE ORAL
Status: DISCONTINUED | OUTPATIENT
Start: 2021-08-04 | End: 2021-08-05

## 2021-08-04 RX ORDER — MELATONIN
3 NIGHTLY PRN
Status: DISCONTINUED | OUTPATIENT
Start: 2021-08-04 | End: 2021-08-05

## 2021-08-04 RX ORDER — ASPIRIN 81 MG/1
81 TABLET, CHEWABLE ORAL DAILY
Status: DISCONTINUED | OUTPATIENT
Start: 2021-08-04 | End: 2021-08-05

## 2021-08-04 RX ORDER — HEPARIN SODIUM 5000 [USP'U]/ML
5000 INJECTION, SOLUTION INTRAVENOUS; SUBCUTANEOUS EVERY 8 HOURS SCHEDULED
Status: DISCONTINUED | OUTPATIENT
Start: 2021-08-04 | End: 2021-08-05

## 2021-08-04 NOTE — PROGRESS NOTES
[facility] Note:  Renal Dose Adjustment for Ketorolac (TORADOL)    Sharene Stage has been prescribed Ketorolac (TORADOL) 30 mg IV x 1 for pain. Estimated Creatinine Clearance: 28.7 mL/min (A) (based on SCr of 1.08 mg/dL (H)).     Calculated creatinin

## 2021-08-04 NOTE — PLAN OF CARE
NURSING ADMISSION NOTE      Patient admitted via Cart. Oriented to room. Safety precautions initiated. Bed in low position. Call light in reach. Pt arrived to unit in stable condition. Upon assessment pt is A&O x4, but can be forgetful.  VSS, afe

## 2021-08-04 NOTE — ED PROVIDER NOTES
Patient Seen in: [facility] 3nw-a      History   Patient presents with: Incontinence  Altered Mental Status  Fatigue    Stated Complaint: Incontinence; AMS;  Fatigue    HPI/Subjective:   HPI    Patient is an 26[facility] Alcohol/week: 0.0 - 1.0 standard drinks      Comment: 1 per month    Drug use: No             Review of Systems    Positive for stated complaint: Incontinence; AMS; Fatigue  Other systems are as noted in HPI. Constitutional and vital signs reviewed. following components:    Procalcitonin 2.41 (*)     All other components within normal limits    Narrative:     Resulted by: batch: CO2, CL, NA, ALB, TBIL, ALT, ALP, CA, CREA, BUN, GLUC,           Resulted by: [id number]: K, AST,    CBC W/ DIFFERENTIAL - Abnor fluids. Procalcitonin is 2.41. Admission disposition: 8/3/2021 10:28 PM       Urine with 21-50 whites, will be cultured cultured. White count 13.8    Lactate is normal.  Creatinine essentially at line. Rapid Covid negative.   Patient has been vaccinat

## 2021-08-04 NOTE — PROGRESS NOTES
[facility]  Progress Note     Washington Palomo Patient Status:  Inpatient    3/15/1935 MRN [id number]   [facility] 3NW-A Attending Mitchell Lara MD   Hosp Day # 0 PCP Patricia To MD     Chief Complaint:  Fall, fatigue, AMS , Valentina this afternoon   5. CT head shows ventricular dilation out of proportion to cortical atrophy with mild hydrocephalus not excluded, will have neurosurgery evaluate given falls, confusion and incontinence at times- will need outpt f/u   6.  PT/OT- fo

## 2021-08-04 NOTE — PROGRESS NOTES
[facility]  Progress Note     Aydin Jang Patient Status:  Inpatient    3/15/1935 MRN [id number]   [facility] [place] 3NW-A Attending Annie Roca MD   Hosp Day # 0 PCP Garry Calvo MD     Chief Complaint: uti   S:  Had bad dys 08/03/21  2111   TROP <0.045       Creatinine Kinase  No results for input(s): CK in the last 168 hours. Inflammatory Markers  No results for input(s): CRP, CORBY, LDH, DDIMER in the last 168 hours. Imaging: Imaging data reviewed in Epic.   Medications:

## 2021-08-04 NOTE — OCCUPATIONAL THERAPY NOTE
OCCUPATIONAL THERAPY QUICK EVALUATION - INPATIENT    Room Number: 321/321-A  Evaluation Date: 8/4/2021     Type of Evaluation: Initial  Presenting Problem: acute cystitis    Physician Order: IP Consult to Occupational Therapy  Reason for Therapy:  ADL/IADL retired  Hand Dominance: Right  Drives: Yes  Patient Regularly Uses: Glasses    Prior Level of Function: Pt typically ind with all self care and mobility.  Pt reports recently attending OP PT for tendinitis in feet, but was nearing end of session due to [facility] Therapy Provided: Pt was received supine in bed for session, pt t/f to EOB with supervision, therapist completed MMT and ROM on pt, pt completed sit to stand with supervision, pt was able to amb x1 in room and hallway and bathroom with supervision, therapi goals:  Patient able to toilet transfer: at previous functional level  Patient able to dress lower extremities: at previous functional level  Patient/Caregiver able to demonstrate safety with ADLS: at previous functional level

## 2021-08-04 NOTE — H&P
[facility]  History and Physical     Gala Deep Patient Status:  Emergency    3/15/1935 MRN [id number]   Location [facility] Attending Celeste Dubon MD   Hosp Day # 0 PCP Peg Gee MD     Chief Complain UNLISTED  05/2002    angioplasty of right leg   • SURGICAL STENT      Coronary Artery Stent   • TONSILLECTOMY  1941   • TOTAL ABDOM HYSTERECTOMY  1985     Social History:  reports that she quit smoking about 26 years ago.  She has never used smokeless tobac (78.9 kg)   SpO2 98%   BMI 32.88 kg/m²   General: No acute distress. Awake and alert  HEENT: Normocephalic atraumatic. Moist mucous membranes. EOM-I. PERRLA. Anicteric. Neck: Trachea midline. No JVD. No carotid bruits.   Respiratory: Clear to auscultation IVF  4. Follow cultures  5. CT head shows ventricular dilation out of proportion to cortical atrophy with mild hydrocephalus not excluded, will have neurosurgery evaluate given falls, confusion and incontinence at times  6. PT/OT  2.  Leukocytosis due to ab

## 2021-08-04 NOTE — CONSULTS
[facility]  Neurosurgery Consult    Charma Crigler Patient Status:  Inpatient    3/15/1935 MRN [id number]   [facility] 3NW-A Attending Belia Grissom MD   Hosp Day # 0 PCP Jason Noel MD     REASON FOR CONSULTATION:  Hydrocephal She reports that she does not use drugs. ALLERGIES:  No Known Allergies    MEDICATIONS:  Polymyxin B-Trimethoprim 57733-2.1 UNIT/ML-% Ophthalmic Solution, Place 1 drop into both eyes every 4 (four) hours. , Disp: 10 mL, Rfl: 0, Past Month at Unknown time 5,000 Units, 5,000 Units, Subcutaneous, Q8H [address]  melatonin tab 3 mg, 3 mg, Oral, Nightly PRN  ondansetron (ZOFRAN) injection 4 mg, 4 mg, Intravenous, Q6H PRN  metoclopramide (REGLAN) injection 5 mg, 5 mg, Intravenous, Q8H PRN  PEG 3350 (MIRALAX) powder pac dimension 3rd ventricle.  No midline shift, mass effect, herniation.  Chronic ischemic white    matter changes.  No acute bleed.  No CT features indicating acute infarct.                        Impression   CONCLUSION:  Ventricular dilation somewhat out of

## 2021-08-04 NOTE — ED INITIAL ASSESSMENT (HPI)
PT TO ED IC WITH C/O AMS, PER DAUGHTER PT WITH MORE FREQUENT FALLS, STANDING AND URINATING ON THE FLOOR, + URINARY FREQUENCY, INCREASED FATIGUE. TODAY PT TOLD DAUGHTER SHE DIDN'T KNOW WHAT WAS DOING OR WHAT WAS GOING ON, JUST STARING INTO SPACE.  INCREASED

## 2021-08-04 NOTE — PLAN OF CARE
Patient is alert, oriented x4, forgetful at times. Vitals stable, afebrile. Stable on room air. Tolerating diet, denies any nausea/vomiting/diarrhea. Incontinent at times. Purewick in place. Last BM 8/2.  Verbalizes burning pain with urination- managing wit strengthening/mobility  - Encourage toileting schedule  Outcome: Progressing     Problem: DISCHARGE PLANNING  Goal: Discharge to home or other facility with appropriate resources  Description: INTERVENTIONS:  - Identify barriers to discharge w/pt and careg

## 2021-08-04 NOTE — PLAN OF CARE
Problem: Patient/Family Goals  Goal: Patient/Family Long Term Goal  Description: Patient's Long Term Goal: discharge home    Interventions:  - neuro consult  - abx  - blood cultures  - See additional Care Plan goals for specific interventions  Outcome: Pro interpreters to assist at discharge as needed  - Consider post-discharge preferences of patient/family/discharge partner  - Complete POLST form as appropriate  - Assess patient's ability to be responsible for managing their own health  - Refer to [facility]

## 2021-08-04 NOTE — PHYSICAL THERAPY NOTE
PHYSICAL THERAPY EVALUATION - INPATIENT     Room Number: 321/321-A  Evaluation Date: 8/4/2021  Type of Evaluation: Initial  Physician Order: PT Eval and Treat    Presenting Problem: acute cystitis  Reason for Therapy: Mobility Dysfunction and Dischar level  Stairs to Enter : 2  Railing: Yes  Stairs to Bedroom: 0       Lives With: Alone (spouse passed 2 months ago)  Drives: Yes  Patient Owned Equipment: Rolling walker;Cane (adjustable bed)  Patient Regularly Uses: Glasses    Prior Level of Darke: bed to a chair (including a wheelchair)?: A Little   -   Need to walk in hospital room?: A Little   -   Climbing 3-5 steps with a railing?: A Little       AM-PAC Score:  Raw Score: 20   Approx Degree of Impairment: 35.83%   Standardized Score (AM-PAC Scale completed and this patient is demonstrating a 35.83% degree of impairment in mobility. Research supports that patients with this level of impairment may benefit from OP PT for generalized strengthening and balance training.  Recommend supervision in home du

## 2021-08-05 VITALS
HEIGHT: 61 IN | HEART RATE: 66 BPM | BODY MASS INDEX: 32.85 KG/M2 | DIASTOLIC BLOOD PRESSURE: 57 MMHG | OXYGEN SATURATION: 97 % | SYSTOLIC BLOOD PRESSURE: 134 MMHG | TEMPERATURE: 98 F | WEIGHT: 174 LBS | RESPIRATION RATE: 16 BRPM

## 2021-08-05 LAB — POTASSIUM SERPL-SCNC: 4 MMOL/L (ref 3.5–5.1)

## 2021-08-05 PROCEDURE — 99239 HOSP IP/OBS DSCHRG MGMT >30: CPT | Performed by: HOSPITALIST

## 2021-08-05 RX ORDER — CEFDINIR 300 MG/1
300 CAPSULE ORAL 2 TIMES DAILY
Qty: 10 CAPSULE | Refills: 0 | Status: SHIPPED | OUTPATIENT
Start: 2021-08-05 | End: 2021-08-10

## 2021-08-05 RX ORDER — PHENAZOPYRIDINE HYDROCHLORIDE 100 MG/1
100 TABLET, FILM COATED ORAL
Qty: 15 TABLET | Refills: 0 | Status: SHIPPED | OUTPATIENT
Start: 2021-08-05 | End: 2021-08-10

## 2021-08-05 NOTE — CM/SW NOTE
08/05/21 1000   Discharge disposition   Expected discharge disposition Home or [facility]   Outpatient services Outpatient rehab services   Discharge transportation Private car

## 2021-08-05 NOTE — PLAN OF CARE
NURSING DISCHARGE NOTE    Pt A&Ox4. Discharge instructions given. All questions answered to pt satisfaction. IV removed and intact. Discharged Home via Wheelchair. Accompanied by Family member and Support staff  Belongings Taken by patient/family.

## 2021-08-05 NOTE — CM/SW NOTE
08/05/21 1000   CM/SW Referral Data   Referral Source Physician   Reason for Referral Discharge planning   Informant Patient   Pertinent Medical Hx   Does patient have an established PCP?  Yes   Patient Info   Patient's Current Mental Status at Time of A

## 2021-08-05 NOTE — PLAN OF CARE
Upon assessment pt is A&O x4, but can be forgetful. VSS, afebrile. RA, . SCDs/ heparin. Pt denies pain at this time. Pt tolerating regular diet, denies n/v. Purewick in place. Pt reports burning with urination.  IV toradol given for pain with adequate rel facility with appropriate resources  Description: INTERVENTIONS:  - Identify barriers to discharge w/pt and caregiver  - Include patient/family/discharge partner in discharge planning  - Arrange for needed discharge resources and transportation as appropri

## 2021-08-05 NOTE — DISCHARGE SUMMARY
[facility] [place] [facility]  DISCHARGE SUMMARY     Diann Close Patient Status:  Inpatient    3/15/1935 MRN [id number]   [facility] 3NW-A Attending No att. providers found   Hosp Day # 1 PCP Radha Hardy MD     Date of Admission: 8/3/2021  Da baseline. Mental status improved in ER. Brief Synopsis: Patient was given IV antibiotics and Pyridium with improvement in symptoms. Mental status returned to baseline without issue. Patient continued to defervesce throughout the hospital course.   Brijesh Skinner BREAKFAST   Quantity: 90 tablet  Refills: 1     metoprolol tartrate 25 MG Tabs  Commonly known as: LOPRESSOR      1 po q day   Quantity: 180 tablet  Refills: 0     OCUVITE PRESERVISION OR      Take 1 tablet by mouth daily.    Refills: 0     One-Daily Multi

## 2021-08-05 NOTE — PLAN OF CARE
Problem: Patient/Family Goals  Goal: Patient/Family Long Term Goal  Description: Patient's Long Term Goal: discharge home    Interventions:  - neuro consult  - abx  - blood cultures  - See additional Care Plan goals for specific interventions  Outcome: P interpreters to assist at discharge as needed  - Consider post-discharge preferences of patient/family/discharge partner  - Complete POLST form as appropriate  - Assess patient's ability to be responsible for managing their own health  - Refer to [facility]

## 2021-08-05 NOTE — PROGRESS NOTES
Patient seen and examined  Chest: CTA B/L  CVS: S1, S2, RRR  ABD: Soft, NT, ND, BS+  EXT: No c/c    DC home on pyridium and cephalosporin  Clinically improving on rocephin, UCx seems never collected from ED  Ravinder Mcgarry MD

## 2021-08-06 ENCOUNTER — TELEPHONE (OUTPATIENT)
Dept: FAMILY MEDICINE CLINIC | Facility: CLINIC | Age: 86
End: 2021-08-06

## 2021-08-06 ENCOUNTER — PATIENT OUTREACH (OUTPATIENT)
Dept: CASE MANAGEMENT | Age: 86
End: 2021-08-06

## 2021-08-06 DIAGNOSIS — Z02.9 ENCOUNTERS FOR UNSPECIFIED ADMINISTRATIVE PURPOSE: ICD-10-CM

## 2021-08-06 PROCEDURE — 1111F DSCHRG MED/CURRENT MED MERGE: CPT

## 2021-08-06 NOTE — TELEPHONE ENCOUNTER
Spoke to the pt today for TCM to follow up on the recent hospitalization for UTI/Sepsis. The patient currently has a TCM-HFU appointment scheduled for 8/16/2021, however, a TCM/HFU appt is recommended by 8/12/2021, as the pt is a high risk for readmission.

## 2021-08-06 NOTE — PROGRESS NOTES
Initial Post Discharge Follow Up   Discharge Date: 8/5/21  Contact Date: 8/6/2021    Consent Verification:  Assessment Completed With: Patient  HIPAA Verified? Yes    Discharge Dx:      Falls and confusion likely due to UTI. AMS improved in hospital- at to your hospital stay (dressing, bathing, ambulating to the bathroom, etc)? yes; NCM did review/stress the importance of fall precautions.    • (NCM) Was patient given a different diet per AVS? no    Medications:   Current Outpatient Medications   Medicatio  your discharge medications when you left the hospital? Yes  • May I go over your medications with you to make sure we are not missing anything? yes  • Are there any reasons that keep you from taking your medication as prescribed?  No  Are you having and ask that patients do not bring anyone with them to their appointment unless clinically required.         Sep 03, 2021  1:30 PM CDT Physical Therapy Ortho Treatment with Drake Ferro PT [facility] in [place] ([address] signs/symptoms to monitor, and reviewed when to contact providers vs go to the ED/call 911. Appointments were reviewed and stressed the importance of a close follow up with providers. A TCM-HFU appointment was scheduled.  A TE was sent to the PCP office for

## 2021-08-16 ENCOUNTER — OFFICE VISIT (OUTPATIENT)
Dept: FAMILY MEDICINE CLINIC | Facility: CLINIC | Age: 86
End: 2021-08-16
Payer: MEDICARE

## 2021-08-16 VITALS
HEART RATE: 106 BPM | HEIGHT: 61 IN | WEIGHT: 177 LBS | BODY MASS INDEX: 33.42 KG/M2 | SYSTOLIC BLOOD PRESSURE: 122 MMHG | OXYGEN SATURATION: 99 % | RESPIRATION RATE: 17 BRPM | DIASTOLIC BLOOD PRESSURE: 70 MMHG | TEMPERATURE: 98 F

## 2021-08-16 DIAGNOSIS — G91.9 HYDROCEPHALUS, UNSPECIFIED TYPE (HCC): ICD-10-CM

## 2021-08-16 DIAGNOSIS — N30.01 ACUTE CYSTITIS WITH HEMATURIA: Primary | ICD-10-CM

## 2021-08-16 DIAGNOSIS — R39.15 URINARY URGENCY: ICD-10-CM

## 2021-08-16 DIAGNOSIS — A41.9 SEPSIS WITHOUT ACUTE ORGAN DYSFUNCTION, DUE TO UNSPECIFIED ORGANISM (HCC): ICD-10-CM

## 2021-08-16 DIAGNOSIS — R35.1 NOCTURIA: ICD-10-CM

## 2021-08-16 PROCEDURE — 99495 TRANSJ CARE MGMT MOD F2F 14D: CPT | Performed by: FAMILY MEDICINE

## 2021-08-16 NOTE — PROGRESS NOTES
Patient presents with: Follow - Up: [facility] Visit    HPI:    Jennifer Holland is a 81year old female here today for hospital follow up.    She was discharged from Inpatient hospital, [facility] to Home   Admission Date: 8/3/21   Discharge Date: 8 Place 1 drop into both eyes every 4 (four) hours.   PANTOPRAZOLE SODIUM 40 MG Oral Tab EC, TAKE 1 TABLET(40 MG) BY MOUTH DAILY  metoprolol Tartrate 25 MG Oral Tab, 1 po q day  LEVOTHYROXINE SODIUM 88 MCG Oral Tab, TAKE 1 TABLET BY MOUTH DAILY BEFORE BREAKFA double vision  HEENT: denies nasal congestion, sinus pain or ST  LUNGS: denies shortness of breath with exertion  CARDIOVASCULAR: denies chest pain on exertion or palpitations  GI: denies abdominal pain, denies heartburn, denies diarrhea  MUSCULOSKELETAL: Negative Negative    Ketones Urine   Negative mg/dL Negative    Blood Urine   Negative ModerateAbnormal     pH Urine   5.0 - 8.0 6.0    Protein Urine   Negative mg/dL Negative    Urobilinogen Urine   <2.0 mg/dL <2.0    Nitrite Urine   Negative Negative Teddy Graham MD, 8/16/2021

## 2021-08-19 ENCOUNTER — OFFICE VISIT (OUTPATIENT)
Dept: PHYSICAL THERAPY | Age: 86
End: 2021-08-19
Attending: HOSPITALIST
Payer: MEDICARE

## 2021-08-19 PROCEDURE — 97110 THERAPEUTIC EXERCISES: CPT

## 2021-08-19 NOTE — PROGRESS NOTES
Dx: B post tib tendon dysfunction      Insurance (Authorized # of Visits):   8     Authorizing Physician: Dr. Ju Fabian  Next MD visit: none scheduled  Fall Risk: standard         Precautions: n/a             .    Subjective:     Had a UTI - fell and had to go board 10 x   Lateral side walking   in bars with HHA  RTB 10 x 2 DF and toes on the R   RTB DF/EV 10   X 2  nustep 5 mins LE only  Rocker board 10 x 2   HHA    Lateral side walking in bars     STM R lateral ankle 5 mins  GTB R DF/EV 10 x each   US 7 mins R

## 2021-08-21 ENCOUNTER — HOSPITAL ENCOUNTER (EMERGENCY)
Facility: HOSPITAL | Age: 86
Discharge: HOME | End: 2021-08-21
Attending: EMERGENCY MEDICINE
Payer: MEDICARE

## 2021-08-21 VITALS
DIASTOLIC BLOOD PRESSURE: 69 MMHG | TEMPERATURE: 98 F | OXYGEN SATURATION: 95 % | WEIGHT: 175 LBS | RESPIRATION RATE: 17 BRPM | SYSTOLIC BLOOD PRESSURE: 154 MMHG | HEIGHT: 66 IN | HEART RATE: 70 BPM | BODY MASS INDEX: 28.13 KG/M2

## 2021-08-21 DIAGNOSIS — N28.9 ACUTE RENAL INSUFFICIENCY: Primary | ICD-10-CM

## 2021-08-21 LAB
ALBUMIN SERPL-MCNC: 3.2 G/DL (ref 3.4–5)
ALBUMIN/GLOB SERPL: 0.9 {RATIO} (ref 1–2)
ALP LIVER SERPL-CCNC: 69 U/L
ALT SERPL-CCNC: 21 U/L
ANION GAP SERPL CALC-SCNC: 2 MMOL/L (ref 0–18)
AST SERPL-CCNC: 29 U/L (ref 15–37)
BASOPHILS # BLD AUTO: 0.05 X10(3) UL (ref 0–0.2)
BASOPHILS NFR BLD AUTO: 1.1 %
BILIRUB SERPL-MCNC: 0.5 MG/DL (ref 0.1–2)
BILIRUB UR QL STRIP.AUTO: NEGATIVE
BUN BLD-MCNC: 22 MG/DL (ref 7–18)
CALCIUM BLD-MCNC: 9.1 MG/DL (ref 8.5–10.1)
CHLORIDE SERPL-SCNC: 107 MMOL/L (ref 98–112)
CLARITY UR REFRACT.AUTO: CLEAR
CO2 SERPL-SCNC: 30 MMOL/L (ref 21–32)
CREAT BLD-MCNC: 1.31 MG/DL
EOSINOPHIL # BLD AUTO: 0.12 X10(3) UL (ref 0–0.7)
EOSINOPHIL NFR BLD AUTO: 2.5 %
ERYTHROCYTE [DISTWIDTH] IN BLOOD BY AUTOMATED COUNT: 14.2 %
GLOBULIN PLAS-MCNC: 3.6 G/DL (ref 2.8–4.4)
GLUCOSE BLD-MCNC: 82 MG/DL (ref 70–99)
GLUCOSE UR STRIP.AUTO-MCNC: NEGATIVE MG/DL
HCT VFR BLD AUTO: 41.8 %
HGB BLD-MCNC: 13.9 G/DL
IMM GRANULOCYTES # BLD AUTO: 0.01 X10(3) UL (ref 0–1)
IMM GRANULOCYTES NFR BLD: 0.2 %
KETONES UR STRIP.AUTO-MCNC: NEGATIVE MG/DL
LEUKOCYTE ESTERASE UR QL STRIP.AUTO: NEGATIVE
LYMPHOCYTES # BLD AUTO: 1.56 X10(3) UL (ref 1–4)
LYMPHOCYTES NFR BLD AUTO: 32.9 %
M PROTEIN MFR SERPL ELPH: 6.8 G/DL (ref 6.4–8.2)
MCH RBC QN AUTO: 30.3 PG (ref 26–34)
MCHC RBC AUTO-ENTMCNC: 33.3 G/DL (ref 31–37)
MCV RBC AUTO: 91.1 FL
MONOCYTES # BLD AUTO: 0.46 X10(3) UL (ref 0.1–1)
MONOCYTES NFR BLD AUTO: 9.7 %
NEUTROPHILS # BLD AUTO: 2.54 X10 (3) UL (ref 1.5–7.7)
NEUTROPHILS # BLD AUTO: 2.54 X10(3) UL (ref 1.5–7.7)
NEUTROPHILS NFR BLD AUTO: 53.6 %
NITRITE UR QL STRIP.AUTO: NEGATIVE
OSMOLALITY SERPL CALC.SUM OF ELEC: 290 MOSM/KG (ref 275–295)
PH UR STRIP.AUTO: 6 [PH] (ref 5–8)
PLATELET # BLD AUTO: 291 10(3)UL (ref 150–450)
POTASSIUM SERPL-SCNC: 5.1 MMOL/L (ref 3.5–5.1)
PROT UR STRIP.AUTO-MCNC: NEGATIVE MG/DL
RBC # BLD AUTO: 4.59 X10(6)UL
SODIUM SERPL-SCNC: 139 MMOL/L (ref 136–145)
SP GR UR STRIP.AUTO: 1 (ref 1–1.03)
UROBILINOGEN UR STRIP.AUTO-MCNC: <2 MG/DL
WBC # BLD AUTO: 4.7 X10(3) UL (ref 4–11)

## 2021-08-21 PROCEDURE — 96361 HYDRATE IV INFUSION ADD-ON: CPT

## 2021-08-21 PROCEDURE — 99284 EMERGENCY DEPT VISIT MOD MDM: CPT

## 2021-08-21 PROCEDURE — 96360 HYDRATION IV INFUSION INIT: CPT

## 2021-08-21 PROCEDURE — 81001 URINALYSIS AUTO W/SCOPE: CPT

## 2021-08-21 PROCEDURE — 80053 COMPREHEN METABOLIC PANEL: CPT | Performed by: EMERGENCY MEDICINE

## 2021-08-21 PROCEDURE — 81001 URINALYSIS AUTO W/SCOPE: CPT | Performed by: EMERGENCY MEDICINE

## 2021-08-21 PROCEDURE — 85025 COMPLETE CBC W/AUTO DIFF WBC: CPT | Performed by: EMERGENCY MEDICINE

## 2021-08-21 RX ORDER — SODIUM CHLORIDE 9 MG/ML
INJECTION, SOLUTION INTRAVENOUS CONTINUOUS
Status: DISCONTINUED | OUTPATIENT
Start: 2021-08-21 | End: 2021-08-22

## 2021-08-21 NOTE — ED PROVIDER NOTES
Patient Seen in: [facility] Emergency Department      History   Patient presents with:  Urinary Symptoms  Altered Mental Status    Stated Complaint: admitted UTI 2 weeks ago, all UTI symptoms returned, frequent urine, itching, c*    HPI/Subjective: drinks      Comment: 1 per month    Drug use: No             Review of Systems    Positive for stated complaint: admitted UTI 2 weeks ago, all UTI symptoms returned, frequent urine, itching, c*  Other systems are as noted in HPI.   Constitutional and vital components within normal limits   CBC WITH DIFFERENTIAL WITH PLATELET    Narrative: The following orders were created for panel order CBC With Differential With Platelet.   Procedure                               Abnormality         Status

## 2021-08-21 NOTE — ED INITIAL ASSESSMENT (HPI)
Pt to ED with complaints of up last night urinating frequently. Pt also reports feeling itching and discomfort to groin, recent UTI, concerns for UTI again.

## 2021-08-24 ENCOUNTER — APPOINTMENT (OUTPATIENT)
Dept: PHYSICAL THERAPY | Age: 86
End: 2021-08-24
Payer: MEDICARE

## 2021-08-27 ENCOUNTER — OFFICE VISIT (OUTPATIENT)
Dept: PHYSICAL THERAPY | Age: 86
End: 2021-08-27
Attending: HOSPITALIST
Payer: MEDICARE

## 2021-08-27 PROCEDURE — 97110 THERAPEUTIC EXERCISES: CPT

## 2021-08-27 NOTE — PROGRESS NOTES
Dx: B post tib tendon dysfunction      Insurance (Authorized # of Visits):   8     Authorizing Physician: Dr. Jero Calderon  Next MD visit: none scheduled  Fall Risk: standard         Precautions: n/a             . Subjective:       Wore my braces yesterday and in bars with HHA  Rocker board 10 x   Lateral side walking   in bars with HHA  RTB 10 x 2 DF and toes on the R   RTB DF/EV 10   X 2  nustep 5 mins LE only  Rocker board 10 x 2   HHA    Lateral side walking in bars     STM R lateral ankle 5 mins  GTB R DF/E Charges:  EX 3       Total Timed Treatment: 45 min  Total Treatment Time: 45 min

## 2021-09-07 DIAGNOSIS — E03.9 ACQUIRED HYPOTHYROIDISM: ICD-10-CM

## 2021-09-07 RX ORDER — LEVOTHYROXINE SODIUM 88 UG/1
TABLET ORAL
Qty: 90 TABLET | Refills: 1 | Status: SHIPPED | OUTPATIENT
Start: 2021-09-07

## 2021-09-07 NOTE — TELEPHONE ENCOUNTER
Requested Prescriptions     Pending Prescriptions Disp Refills   • LEVOTHYROXINE 88 MCG Oral Tab [Pharmacy Med Name: LEVOTHYROXINE 0.088MG (88MCG) TAB] 90 tablet 1     Sig: TAKE 1 TABLET BY MOUTH DAILY BEFORE BREAKFAST     LOV 8/16/2021     Patient was ask

## 2021-09-28 NOTE — H&P
HPI:     Ashley Reynolds is a 81year old female with a PMH of HTN, HL, DM, hypothyroid, CAD s/p CABG x 5 and stent, GERD. She presents as a consult from Dr Matteo Serna office with urinary frequency, urgency/UTI, urinary incontinence.     Admitted from 8/3- materials for this. She will double or triple water intake, start 10 mg oxybutynin ER, start Kegels, CT scan. She is also interested in PFT with Trina Simmons if possible. F/u in a few weeks with Torie Olivier for check-up.  May consider myrbetriq but not sure if he Tab TAKE 1 TABLET BY MOUTH DAILY BEFORE BREAKFAST 90 tablet 1   • metoprolol tartrate 25 MG Oral Tab 1 po q day 90 tablet 3   • rosuvastatin 40 MG Oral Tab Take 1 tablet (40 mg total) by mouth daily.  90 tablet 3   • ezetimibe 10 MG Oral Tab Take 1 tablet ( will cover. Thank you very much for this consult. I will do my best to keep you informed of all significant urological developments.       Christian Hood MD, [facility]  Urologist  [facility]  Office: [phone]

## 2021-10-06 ENCOUNTER — OFFICE VISIT (OUTPATIENT)
Dept: PHYSICAL THERAPY | Age: 86
End: 2021-10-06
Attending: PODIATRIST
Payer: MEDICARE

## 2021-10-06 ENCOUNTER — OFFICE VISIT (OUTPATIENT)
Dept: SURGERY | Facility: CLINIC | Age: 86
End: 2021-10-06
Payer: MEDICARE

## 2021-10-06 DIAGNOSIS — R39.15 URINARY URGENCY: ICD-10-CM

## 2021-10-06 DIAGNOSIS — R31.21 ASYMPTOMATIC MICROSCOPIC HEMATURIA: Primary | ICD-10-CM

## 2021-10-06 DIAGNOSIS — N39.41 URGE INCONTINENCE: ICD-10-CM

## 2021-10-06 PROCEDURE — 97110 THERAPEUTIC EXERCISES: CPT

## 2021-10-06 PROCEDURE — 99204 OFFICE O/P NEW MOD 45 MIN: CPT | Performed by: UROLOGY

## 2021-10-06 RX ORDER — OXYBUTYNIN CHLORIDE 10 MG/1
10 TABLET, EXTENDED RELEASE ORAL DAILY
Qty: 90 TABLET | Refills: 6 | Status: SHIPPED | OUTPATIENT
Start: 2021-10-06

## 2021-10-06 NOTE — PATIENT INSTRUCTIONS
1. Increase water intake to 40-60 ounces (2 liters) per day or enough to keep urine clear to pale yellow. 2. Cut back on dietary irritants such as coffee, tea, soda, juice. Read through dietary irritant handout. 3. Start oxybutynin  4.  Start Kegel exerci

## 2021-10-06 NOTE — PROGRESS NOTES
Dx: B post tib tendon dysfunction      Insurance (Authorized # of Visits):   8     Authorizing Physician: Dr. Leonie Hammond  Next MD visit: none scheduled  Fall Risk: standard         Precautions: n/a             . Subjective:  Pt is feeling pretty good.  I did DF and toes on the R   RTB DF/EV 10   X 2  nustep 5 mins LE only  Rocker board 10 x 2   HHA    Lateral side walking in bars     STM R lateral ankle 5 mins  GTB R DF/EV 10 x each   US 7 mins R lateral ankle          nustep 5 mins   Rocker board 10 x   M/L a w/cm2  PROM R ankle  R ankle BTB all planes 10 x                                              HEP:  Stretches for home     Charges:  EX 3       Total Timed Treatment: 45 min  Total Treatment Time: 45 min

## 2021-10-20 ENCOUNTER — TELEPHONE (OUTPATIENT)
Dept: FAMILY MEDICINE CLINIC | Facility: CLINIC | Age: 86
End: 2021-10-20

## 2021-10-25 ENCOUNTER — OFFICE VISIT (OUTPATIENT)
Dept: FAMILY MEDICINE CLINIC | Facility: CLINIC | Age: 86
End: 2021-10-25
Payer: MEDICARE

## 2021-10-25 ENCOUNTER — TELEPHONE (OUTPATIENT)
Dept: FAMILY MEDICINE CLINIC | Facility: CLINIC | Age: 86
End: 2021-10-25

## 2021-10-25 VITALS
OXYGEN SATURATION: 98 % | DIASTOLIC BLOOD PRESSURE: 70 MMHG | HEIGHT: 60 IN | BODY MASS INDEX: 33.7 KG/M2 | TEMPERATURE: 99 F | RESPIRATION RATE: 16 BRPM | HEART RATE: 64 BPM | SYSTOLIC BLOOD PRESSURE: 124 MMHG | WEIGHT: 171.63 LBS

## 2021-10-25 DIAGNOSIS — E78.2 MIXED HYPERLIPIDEMIA: ICD-10-CM

## 2021-10-25 DIAGNOSIS — R35.0 URINARY FREQUENCY: ICD-10-CM

## 2021-10-25 DIAGNOSIS — I25.10 CORONARY ARTERY DISEASE INVOLVING NATIVE HEART WITHOUT ANGINA PECTORIS, UNSPECIFIED VESSEL OR LESION TYPE: ICD-10-CM

## 2021-10-25 DIAGNOSIS — E03.9 ACQUIRED HYPOTHYROIDISM: ICD-10-CM

## 2021-10-25 DIAGNOSIS — Z95.1 S/P CABG X 5: ICD-10-CM

## 2021-10-25 DIAGNOSIS — Z13.31 DEPRESSION SCREENING: ICD-10-CM

## 2021-10-25 DIAGNOSIS — I10 PRIMARY HYPERTENSION: ICD-10-CM

## 2021-10-25 DIAGNOSIS — Z00.00 ENCOUNTER FOR ANNUAL HEALTH EXAMINATION: Primary | ICD-10-CM

## 2021-10-25 PROCEDURE — 90662 IIV NO PRSV INCREASED AG IM: CPT | Performed by: FAMILY MEDICINE

## 2021-10-25 PROCEDURE — G0439 PPPS, SUBSEQ VISIT: HCPCS | Performed by: FAMILY MEDICINE

## 2021-10-25 PROCEDURE — G0444 DEPRESSION SCREEN ANNUAL: HCPCS | Performed by: FAMILY MEDICINE

## 2021-10-25 PROCEDURE — G0008 ADMIN INFLUENZA VIRUS VAC: HCPCS | Performed by: FAMILY MEDICINE

## 2021-10-25 NOTE — PROGRESS NOTES
HPI:   Jorge A Culver is a 81year old female who presents for a Medicare Subsequent Annual Wellness visit (Pt already had Initial Annual Wellness). Preventative  Breast: 3/2019. Colon: n/a  Pap: n/a  Immunizations: COVID UTD x 2. Flu annually.   PN explanation and discussion of advance directives standard forms performed Face to Face with patient and Family/surrogate (if present), and forms available to patient in AVS       She smoked tobacco in the past but quit greater than 12 months ago.   Social H Allergies. CURRENT MEDICATIONS:   oxybutynin 10 MG Oral Tablet 24 Hr, Take 1 tablet (10 mg total) by mouth daily.   LEVOTHYROXINE 88 MCG Oral Tab, TAKE 1 TABLET BY MOUTH DAILY BEFORE BREAKFAST  metoprolol tartrate 25 MG Oral Tab, 1 po q day  rosuvastatin frequency  Neurological: negative      EXAM:   /70   Pulse 64   Temp 98.6 °F (37 °C) (Tympanic)   Resp 16   Ht 5' (1.524 m)   Wt 171 lb 9.6 oz (77.8 kg)   SpO2 98%   BMI 33.51 kg/m²  Estimated body mass index is 33.51 kg/m² as calculated from the fol (45922) 10/08/2019, 10/09/2020, 10/25/2021   • FLUAD High Dose 65 yr and older (83154) 10/03/2017, 10/08/2018   • FLUZONE 6 months and older PFS 0.5 ml (67611) 10/01/2014   • Fluzone Vaccine Medicare () 10/08/2019, 10/09/2020   • Influenza 09/22/2010, the plan. Reinforced healthy diet, lifestyle, and exercise. Return in 6 months (on 4/25/2022).      Stephanie MD Macario, 10/25/2021     General Health     In the past six months, have you lost more than 10 pounds without trying?: 2 - No  Has your appeti following:    Colonoscopy   Covered every 10 years    Covered every 2 years if patient is at high risk or previous colonoscopy was abnormal 08/24/2015    Colonoscopy due on 08/24/2020    Flexible Sigmoidoscopy   Covered every 4 years -    Fecal Occult Bloo

## 2021-10-25 NOTE — PATIENT INSTRUCTIONS
[address] SCREENING SCHEDULE   Tests on this list are recommended by your physician but may not be covered, or covered at this frequency, by your insurer. Please check with your insurance carrier before scheduling to verify coverage.    Michelle Lanier 02/20/2018      No recommendations at this time   Pap and Pelvic    Pap   Covered every 2 years for women at normal risk;  Annually if at high risk -  No recommendations at this time    Chlamydia Annually if high risk -  No recommendations at this time   Sc regarding Advance Directives.

## 2021-10-29 ENCOUNTER — HOSPITAL ENCOUNTER (OUTPATIENT)
Dept: CT IMAGING | Facility: HOSPITAL | Age: 86
Discharge: HOME | End: 2021-10-29
Attending: UROLOGY
Payer: MEDICARE

## 2021-10-29 ENCOUNTER — LAB ENCOUNTER (OUTPATIENT)
Dept: LAB | Facility: HOSPITAL | Age: 86
End: 2021-10-29
Attending: UROLOGY
Payer: MEDICARE

## 2021-10-29 ENCOUNTER — TELEPHONE (OUTPATIENT)
Dept: SURGERY | Facility: CLINIC | Age: 86
End: 2021-10-29

## 2021-10-29 DIAGNOSIS — R35.0 URINARY FREQUENCY: ICD-10-CM

## 2021-10-29 DIAGNOSIS — I10 PRIMARY HYPERTENSION: ICD-10-CM

## 2021-10-29 DIAGNOSIS — E03.9 ACQUIRED HYPOTHYROIDISM: ICD-10-CM

## 2021-10-29 DIAGNOSIS — R31.21 ASYMPTOMATIC MICROSCOPIC HEMATURIA: ICD-10-CM

## 2021-10-29 DIAGNOSIS — E78.2 MIXED HYPERLIPIDEMIA: ICD-10-CM

## 2021-10-29 PROCEDURE — 81001 URINALYSIS AUTO W/SCOPE: CPT

## 2021-10-29 PROCEDURE — 36415 COLL VENOUS BLD VENIPUNCTURE: CPT

## 2021-10-29 PROCEDURE — 80053 COMPREHEN METABOLIC PANEL: CPT

## 2021-10-29 PROCEDURE — 80061 LIPID PANEL: CPT

## 2021-10-29 PROCEDURE — 87086 URINE CULTURE/COLONY COUNT: CPT

## 2021-10-29 PROCEDURE — 74178 CT ABD&PLV WO CNTR FLWD CNTR: CPT | Performed by: UROLOGY

## 2021-10-29 PROCEDURE — 76377 3D RENDER W/INTRP POSTPROCES: CPT | Performed by: UROLOGY

## 2021-10-29 PROCEDURE — 84439 ASSAY OF FREE THYROXINE: CPT

## 2021-10-29 PROCEDURE — 84443 ASSAY THYROID STIM HORMONE: CPT

## 2021-10-29 PROCEDURE — 82565 ASSAY OF CREATININE: CPT

## 2021-10-29 NOTE — TELEPHONE ENCOUNTER
My chart message sent to pt with Dr. Akil Gonzalez comments regarding CT urogram    Results reviewed. CT shows no significant urologic abnormalities. Has very tiny stones which are < 1 mm and would be too small to treat and would not be source of any symptoms.  Sh

## 2021-10-29 NOTE — PROGRESS NOTES
Results reviewed. CT shows no significant urologic abnormalities. Has very tiny stones which are < 1 mm and would be too small to treat and would not be source of any symptoms. She should drink plenty of water in the future to prevent growth.  No changes to

## 2021-10-29 NOTE — TELEPHONE ENCOUNTER
----- Message from Aguilar Martinez MD sent at 10/29/2021  8:50 AM CDT -----  Results reviewed. CT shows no significant urologic abnormalities. Has very tiny stones which are < 1 mm and would be too small to treat and would not be source of any symptoms.  She

## 2021-11-02 ENCOUNTER — OFFICE VISIT (OUTPATIENT)
Dept: SURGERY | Facility: CLINIC | Age: 86
End: 2021-11-02
Payer: MEDICARE

## 2021-11-02 DIAGNOSIS — N39.41 URGE INCONTINENCE: ICD-10-CM

## 2021-11-02 DIAGNOSIS — N32.81 OAB (OVERACTIVE BLADDER): Primary | ICD-10-CM

## 2021-11-02 PROCEDURE — 99213 OFFICE O/P EST LOW 20 MIN: CPT | Performed by: PHYSICIAN ASSISTANT

## 2021-11-02 NOTE — PROGRESS NOTES
Subjective:   Arcelia Dugan is a 81year old female with a PMH of HTN, HL, DM, hypothyroid, CAD s/p CABG x 5 and stent, GERD, OAB with UUI who presents today for follow up.    Was seen in consultation 3 weeks ago with Dr. Ethel Green and she was started on oxybu times a week. • DM-guaiFENesin ER  MG Oral Tablet 12 Hr Take 1 tablet by mouth daily. • aspirin 81 MG Oral Tab Take 81 mg by mouth daily. Review of Systems:  Pertinent items are noted in HPI. Objective:    There were no vitals t 10/24/2019    URINECUL >100,000 CFU/ML Gram positive javi 04/09/2015       Imaging  CT UROGRAM(W+WO) W/3D(CPT=74178/45894)    Result Date: 10/29/2021  PROCEDURE:  CT UROGRAM(W+WO) W/3D (CPT=74178/91121)  COMPARISON:  None.   INDICATIONS:  R31.21 Asymptom ectasia of the infrarenal abdominal aorta measuring 2.1 x 2.0 cm. Atheromatous calcifications of the aorta. BONES:  Multilevel degenerative disc disease greatest at L4-L5 level. CONCLUSION:  1.  No CT findings to correlate with patient's hematu

## 2021-11-02 NOTE — PATIENT INSTRUCTIONS
Continue oxybutynin daily. Monitor for side effects of constipation or dry mouth. Longterm use can cause confusion. Proceed with pelvic floor therapy. Follow up in 6 months or sooner if needed.      *check with pharmacist about Myrbetriq*    Future Appo

## 2021-11-18 ENCOUNTER — OFFICE VISIT (OUTPATIENT)
Dept: PHYSICAL THERAPY | Age: 86
End: 2021-11-18
Attending: UROLOGY
Payer: MEDICARE

## 2021-11-18 DIAGNOSIS — N39.41 URGE INCONTINENCE: ICD-10-CM

## 2021-11-18 PROCEDURE — 97161 PT EVAL LOW COMPLEX 20 MIN: CPT

## 2021-11-18 NOTE — PROGRESS NOTES
MUSCULOSKELETAL AND PELVIC FLOOR EVALUATION:   Referring Physician: Dr. Nidhi Kang  Diagnosis:  Urinary incontinence  Date of Service: 11/18/2021     PATIENT Rhonda Barba is a 81year old female  who presents to therapy today with complaints of l time  URINARY HABITS  Types of symptoms: stress incontinence  Events associated with the onset of urinary complaints:  Loss throughout the day, better since starting a new medication  Abdominal/Vaginal Pressure complaints: none   Urinary Frequency: normal pt ambulates on level ground with assistive device of quad cane. carrying it with her today .      External Palpation:  No pain   Scars (location/surgery):  None - hysterectomy scar  Abdominal wall   Abdominal Wall Integrity:  Weak   Diastasis Recti:   NA instructed in and issued a HEP for: diet/bladder/bowel diary     Charges: PT Eval Low Complexity,        Total Timed Treatment: 45 min     Total Treatment Time: 45 min     Based on clinical rationale and outcome measures, this evaluation involved Low Compl

## 2021-11-23 ENCOUNTER — APPOINTMENT (OUTPATIENT)
Dept: PHYSICAL THERAPY | Age: 86
End: 2021-11-23
Attending: UROLOGY
Payer: MEDICARE

## 2021-12-02 ENCOUNTER — OFFICE VISIT (OUTPATIENT)
Dept: PHYSICAL THERAPY | Age: 86
End: 2021-12-02
Attending: UROLOGY
Payer: MEDICARE

## 2021-12-02 PROCEDURE — 97110 THERAPEUTIC EXERCISES: CPT

## 2021-12-02 NOTE — PROGRESS NOTES
Dx:        Insurance (Authorized # of Visits):   Urinary incontinence          Authorizing Physician: Dr. Dinesh Emerson MD visit: none scheduled  Fall Risk: standard         Precautions: n/a             Subjective:  Pt is feeling increased constipation since

## 2021-12-09 ENCOUNTER — OFFICE VISIT (OUTPATIENT)
Dept: PHYSICAL THERAPY | Age: 86
End: 2021-12-09
Attending: UROLOGY
Payer: MEDICARE

## 2021-12-09 PROCEDURE — 97110 THERAPEUTIC EXERCISES: CPT

## 2021-12-09 NOTE — PROGRESS NOTES
Dx:        Insurance (Authorized # of Visits):   Urinary incontinence          Authorizing Physician: Dr. Trae Soriano  Next MD visit: none scheduled  Fall Risk: standard         Precautions: n/a             Subjective:  I do not think I got up last night to [place] sitting, add and abd review    Charges:  EX 3        Total Timed Treatment: 45 min  Total Treatment Time: 45 min

## 2021-12-09 NOTE — PROGRESS NOTES
12/9/21- pt has not been taking the medication for the bladder due to increased constipation. Still not having as much loss of urine.

## 2022-01-06 ENCOUNTER — OFFICE VISIT (OUTPATIENT)
Dept: PHYSICAL THERAPY | Age: 87
End: 2022-01-06
Attending: UROLOGY
Payer: MEDICARE

## 2022-01-06 PROCEDURE — 97110 THERAPEUTIC EXERCISES: CPT

## 2022-01-06 NOTE — PROGRESS NOTES
Dx:        Insurance (Authorized # of Visits):   Urinary incontinence          Authorizing Physician: Dr. Ayo Emerson MD visit: none scheduled  Fall Risk: standard         Precautions: n/a             Subjective:   Pt is back from her trip.  Does report R kn BTB      nustep 5 mins LE only   Lateral side stepping with GTB with PF contractions HHA in bars    Front walking GTB in bars HHA with PF contractions  Seated GTB abd with PF contractions 10 x   Marching GTB 10 x with PF contractions  Bridge 10  X with PF

## 2022-01-13 ENCOUNTER — OFFICE VISIT (OUTPATIENT)
Dept: PHYSICAL THERAPY | Age: 87
End: 2022-01-13
Attending: UROLOGY
Payer: MEDICARE

## 2022-01-13 PROCEDURE — 97110 THERAPEUTIC EXERCISES: CPT

## 2022-01-13 NOTE — PROGRESS NOTES
Dx:        Insurance (Authorized # of Visits):   Urinary incontinence          Authorizing Physician: Dr. Mary Ann Emerson MD visit: none scheduled  Fall Risk: standard         Precautions: n/a             Subjective:    Feeling ok, have to change my pad 2 x a d contractions  Bridge 10  X with PF contractions   SB flex R knee   ITB on the R in side lying with roller 5 mins  Add with towel 10 x with PF contractions                Lateral side stepping with PF contraction Red band in bars 20 x   Flex and ext Red ban

## 2022-01-20 ENCOUNTER — APPOINTMENT (OUTPATIENT)
Dept: PHYSICAL THERAPY | Age: 87
End: 2022-01-20
Attending: UROLOGY
Payer: MEDICARE

## 2022-01-26 ENCOUNTER — OFFICE VISIT (OUTPATIENT)
Dept: PHYSICAL THERAPY | Age: 87
End: 2022-01-26
Attending: UROLOGY
Payer: MEDICARE

## 2022-01-26 PROCEDURE — 97110 THERAPEUTIC EXERCISES: CPT

## 2022-02-04 ENCOUNTER — OFFICE VISIT (OUTPATIENT)
Dept: FAMILY MEDICINE CLINIC | Facility: CLINIC | Age: 87
End: 2022-02-04
Payer: MEDICARE

## 2022-02-04 VITALS
TEMPERATURE: 98 F | RESPIRATION RATE: 16 BRPM | HEIGHT: 60 IN | WEIGHT: 166 LBS | SYSTOLIC BLOOD PRESSURE: 118 MMHG | BODY MASS INDEX: 32.59 KG/M2 | DIASTOLIC BLOOD PRESSURE: 60 MMHG | OXYGEN SATURATION: 98 % | HEART RATE: 72 BPM

## 2022-02-04 DIAGNOSIS — Z86.19 HISTORY OF SHINGLES: ICD-10-CM

## 2022-02-04 DIAGNOSIS — M77.50 FOOT TENDINITIS: Primary | ICD-10-CM

## 2022-02-04 PROCEDURE — 99213 OFFICE O/P EST LOW 20 MIN: CPT | Performed by: FAMILY MEDICINE

## 2022-04-05 RX ORDER — LEVOTHYROXINE SODIUM 88 UG/1
TABLET ORAL
Qty: 90 TABLET | Refills: 0 | Status: SHIPPED | OUTPATIENT
Start: 2022-04-05

## 2022-05-04 ENCOUNTER — OFFICE VISIT (OUTPATIENT)
Dept: SURGERY | Facility: CLINIC | Age: 87
End: 2022-05-04
Payer: MEDICARE

## 2022-05-04 DIAGNOSIS — N32.81 OAB (OVERACTIVE BLADDER): Primary | ICD-10-CM

## 2022-05-04 DIAGNOSIS — N39.41 URGE INCONTINENCE: ICD-10-CM

## 2022-05-04 PROCEDURE — 99213 OFFICE O/P EST LOW 20 MIN: CPT | Performed by: PHYSICIAN ASSISTANT

## 2022-05-06 ENCOUNTER — OFFICE VISIT (OUTPATIENT)
Dept: FAMILY MEDICINE CLINIC | Facility: CLINIC | Age: 87
End: 2022-05-06
Payer: MEDICARE

## 2022-05-06 VITALS
OXYGEN SATURATION: 98 % | HEART RATE: 70 BPM | RESPIRATION RATE: 16 BRPM | SYSTOLIC BLOOD PRESSURE: 132 MMHG | DIASTOLIC BLOOD PRESSURE: 72 MMHG | HEIGHT: 60 IN | WEIGHT: 163.19 LBS | TEMPERATURE: 97 F | BODY MASS INDEX: 32.04 KG/M2

## 2022-05-06 DIAGNOSIS — I25.10 CORONARY ARTERY DISEASE INVOLVING NATIVE HEART WITHOUT ANGINA PECTORIS, UNSPECIFIED VESSEL OR LESION TYPE: ICD-10-CM

## 2022-05-06 DIAGNOSIS — Z95.1 S/P CABG X 5: ICD-10-CM

## 2022-05-06 DIAGNOSIS — I10 PRIMARY HYPERTENSION: ICD-10-CM

## 2022-05-06 DIAGNOSIS — E03.9 ACQUIRED HYPOTHYROIDISM: Primary | ICD-10-CM

## 2022-05-06 DIAGNOSIS — R41.3 MEMORY DEFICIT: ICD-10-CM

## 2022-05-06 PROCEDURE — 99214 OFFICE O/P EST MOD 30 MIN: CPT | Performed by: FAMILY MEDICINE

## 2022-06-28 ENCOUNTER — TELEPHONE (OUTPATIENT)
Dept: FAMILY MEDICINE CLINIC | Facility: CLINIC | Age: 87
End: 2022-06-28

## 2022-06-28 ENCOUNTER — TELEPHONE (OUTPATIENT)
Dept: SURGERY | Facility: CLINIC | Age: 87
End: 2022-06-28

## 2022-06-28 DIAGNOSIS — R39.15 URINARY URGENCY: ICD-10-CM

## 2022-06-28 NOTE — TELEPHONE ENCOUNTER
Called and talked to patient and told her she should have refills sent her to discuss this with the pharmacy

## 2022-06-28 NOTE — TELEPHONE ENCOUNTER
Pt would like to discuss the medication that she takes for her bladder. She is also going to try to reach the urologist. Please call.  Ph [phone]

## 2022-06-28 NOTE — TELEPHONE ENCOUNTER
Per pt calling for oxybutynin, states she never picked it up and needs a new prescription.  Please advise

## 2022-06-30 RX ORDER — OXYBUTYNIN CHLORIDE 10 MG/1
10 TABLET, EXTENDED RELEASE ORAL DAILY
Qty: 90 TABLET | Refills: 3 | Status: SHIPPED | OUTPATIENT
Start: 2022-06-30

## 2022-07-06 DIAGNOSIS — E03.9 ACQUIRED HYPOTHYROIDISM: ICD-10-CM

## 2022-07-07 RX ORDER — LEVOTHYROXINE SODIUM 88 UG/1
TABLET ORAL
Qty: 90 TABLET | Refills: 0 | Status: SHIPPED | OUTPATIENT
Start: 2022-07-07

## 2022-07-16 DIAGNOSIS — E03.9 ACQUIRED HYPOTHYROIDISM: ICD-10-CM

## 2022-07-20 RX ORDER — LEVOTHYROXINE SODIUM 88 UG/1
TABLET ORAL
Qty: 90 TABLET | Refills: 0 | Status: SHIPPED | OUTPATIENT
Start: 2022-07-20

## 2022-10-03 ENCOUNTER — TELEPHONE (OUTPATIENT)
Dept: FAMILY MEDICINE CLINIC | Facility: CLINIC | Age: 87
End: 2022-10-03

## 2022-10-07 ENCOUNTER — OFFICE VISIT (OUTPATIENT)
Dept: FAMILY MEDICINE CLINIC | Facility: CLINIC | Age: 87
End: 2022-10-07
Payer: MEDICARE

## 2022-10-07 VITALS
RESPIRATION RATE: 16 BRPM | TEMPERATURE: 99 F | HEIGHT: 60 IN | BODY MASS INDEX: 30.82 KG/M2 | HEART RATE: 63 BPM | WEIGHT: 157 LBS | SYSTOLIC BLOOD PRESSURE: 136 MMHG | OXYGEN SATURATION: 98 % | DIASTOLIC BLOOD PRESSURE: 82 MMHG

## 2022-10-07 DIAGNOSIS — R73.01 ELEVATED FASTING GLUCOSE: ICD-10-CM

## 2022-10-07 DIAGNOSIS — N39.41 URGE INCONTINENCE: ICD-10-CM

## 2022-10-07 DIAGNOSIS — I10 PRIMARY HYPERTENSION: ICD-10-CM

## 2022-10-07 DIAGNOSIS — E03.9 ACQUIRED HYPOTHYROIDISM: ICD-10-CM

## 2022-10-07 DIAGNOSIS — Z00.00 ENCOUNTER FOR ANNUAL HEALTH EXAMINATION: Primary | ICD-10-CM

## 2022-10-07 DIAGNOSIS — I25.10 CORONARY ARTERY DISEASE INVOLVING NATIVE HEART WITHOUT ANGINA PECTORIS, UNSPECIFIED VESSEL OR LESION TYPE: ICD-10-CM

## 2022-10-07 DIAGNOSIS — F02.B0 MODERATE ALZHEIMER'S DEMENTIA WITHOUT BEHAVIORAL DISTURBANCE, PSYCHOTIC DISTURBANCE, MOOD DISTURBANCE, OR ANXIETY, UNSPECIFIED TIMING OF DEMENTIA ONSET (HCC): ICD-10-CM

## 2022-10-07 DIAGNOSIS — E78.2 MIXED HYPERLIPIDEMIA: ICD-10-CM

## 2022-10-07 DIAGNOSIS — G30.9 MODERATE ALZHEIMER'S DEMENTIA WITHOUT BEHAVIORAL DISTURBANCE, PSYCHOTIC DISTURBANCE, MOOD DISTURBANCE, OR ANXIETY, UNSPECIFIED TIMING OF DEMENTIA ONSET (HCC): ICD-10-CM

## 2022-10-07 PROCEDURE — 90662 IIV NO PRSV INCREASED AG IM: CPT | Performed by: FAMILY MEDICINE

## 2022-10-07 PROCEDURE — 1125F AMNT PAIN NOTED PAIN PRSNT: CPT | Performed by: FAMILY MEDICINE

## 2022-10-07 PROCEDURE — G0008 ADMIN INFLUENZA VIRUS VAC: HCPCS | Performed by: FAMILY MEDICINE

## 2022-10-07 PROCEDURE — G0439 PPPS, SUBSEQ VISIT: HCPCS | Performed by: FAMILY MEDICINE

## 2022-10-07 RX ORDER — ROSUVASTATIN CALCIUM 40 MG/1
40 TABLET, COATED ORAL DAILY
Qty: 90 TABLET | Refills: 3 | Status: SHIPPED | OUTPATIENT
Start: 2022-10-07

## 2022-10-07 RX ORDER — EZETIMIBE 10 MG/1
10 TABLET ORAL DAILY
Qty: 90 TABLET | Refills: 3 | Status: SHIPPED | OUTPATIENT
Start: 2022-10-07

## 2022-10-07 RX ORDER — DONEPEZIL HYDROCHLORIDE 5 MG/1
5 TABLET, FILM COATED ORAL NIGHTLY
Qty: 90 TABLET | Refills: 1 | Status: SHIPPED | OUTPATIENT
Start: 2022-10-07

## 2022-11-02 ENCOUNTER — TELEPHONE (OUTPATIENT)
Dept: FAMILY MEDICINE CLINIC | Facility: CLINIC | Age: 87
End: 2022-11-02

## 2022-11-02 NOTE — TELEPHONE ENCOUNTER
Pt would like to review her med list with a nurse. She is not sure what she is suppose to take and not take.

## 2022-11-04 DIAGNOSIS — K21.9 GASTROESOPHAGEAL REFLUX DISEASE: ICD-10-CM

## 2022-11-04 DIAGNOSIS — R39.15 URINARY URGENCY: ICD-10-CM

## 2022-11-04 DIAGNOSIS — E03.9 ACQUIRED HYPOTHYROIDISM: ICD-10-CM

## 2022-11-04 RX ORDER — PANTOPRAZOLE SODIUM 40 MG/1
40 TABLET, DELAYED RELEASE ORAL DAILY
Qty: 90 TABLET | Refills: 3 | Status: SHIPPED | OUTPATIENT
Start: 2022-11-04

## 2022-11-04 RX ORDER — LEVOTHYROXINE SODIUM 88 UG/1
88 TABLET ORAL
Qty: 90 TABLET | Refills: 0 | Status: SHIPPED | OUTPATIENT
Start: 2022-11-04

## 2022-11-04 NOTE — TELEPHONE ENCOUNTER
Pt calling to request refills on Oxybutynin, Euthyrox, Pantoprazole. She will be running out in the next couple days.  Please send refills to [facility] on [address]

## 2022-11-04 NOTE — TELEPHONE ENCOUNTER
Unable to refill Euthyrox per protocol. Has not updated lab work. Oxybutinin ordered by urology #90/3 on 6/30/22 to [facility] [place]. Patient may transfer medication. No protocol for pantoprazole. Patient notified and will go for lab work once she has transportation. [id number] [address] for refill of pending? ([address])    LOV 10/7/22 with Dr. Ced Kc.

## 2022-12-08 ENCOUNTER — TELEPHONE (OUTPATIENT)
Dept: ORTHOPEDICS CLINIC | Facility: CLINIC | Age: 87
End: 2022-12-08

## 2022-12-08 DIAGNOSIS — M79.661 PAIN IN RIGHT LOWER LEG: Primary | ICD-10-CM

## 2022-12-08 NOTE — TELEPHONE ENCOUNTER
If appropriate, please enter an Xray RX for a RT Lower Leg for  this patient's upcoming appointment, as the patient has not had any imaging completed. Of note, the patient saw Dr. Shahrzad Carter for a similar issue May of 2021. Patient was notified that she may need to get X-rays before appt. PLEASE FORWARD THIS MESSAGE back to this  pool if Xray RXs are entered so that we can schedule the Xray appt and notify the patient to come in early. Thank you, in advance!     Future Appointments   Date Time Provider Lissette Romero   12/13/2022  8:40 AM ZULEIKA Guadalupe [id number]

## 2022-12-08 NOTE — TELEPHONE ENCOUNTER
Reviewed patients chart, xray orders are required. Order placed for rt lower leg xrays.  Please contact patient advise to arrive 30 mins prior to patients appt to complete x-ray order and schedule patients xray appt-Thank you

## 2022-12-13 ENCOUNTER — HOSPITAL ENCOUNTER (OUTPATIENT)
Dept: GENERAL RADIOLOGY | Age: 87
Discharge: HOME | End: 2022-12-13
Attending: PHYSICIAN ASSISTANT
Payer: MEDICARE

## 2022-12-13 ENCOUNTER — OFFICE VISIT (OUTPATIENT)
Dept: ORTHOPEDICS CLINIC | Facility: CLINIC | Age: 87
End: 2022-12-13
Payer: MEDICARE

## 2022-12-13 VITALS — WEIGHT: 157 LBS | BODY MASS INDEX: 30.82 KG/M2 | HEIGHT: 60 IN

## 2022-12-13 DIAGNOSIS — M17.11 PRIMARY OSTEOARTHRITIS OF RIGHT KNEE: Primary | ICD-10-CM

## 2022-12-13 DIAGNOSIS — M79.661 PAIN IN RIGHT LOWER LEG: ICD-10-CM

## 2022-12-13 PROBLEM — G30.9 ALZHEIMER'S DISEASE, UNSPECIFIED (HCC): Status: ACTIVE | Noted: 2022-12-13

## 2022-12-13 PROBLEM — F02.80 ALZHEIMER'S DISEASE, UNSPECIFIED (HCC): Status: ACTIVE | Noted: 2022-12-13

## 2022-12-13 PROCEDURE — 1125F AMNT PAIN NOTED PAIN PRSNT: CPT | Performed by: PHYSICIAN ASSISTANT

## 2022-12-13 PROCEDURE — 73590 X-RAY EXAM OF LOWER LEG: CPT | Performed by: PHYSICIAN ASSISTANT

## 2022-12-13 PROCEDURE — 99204 OFFICE O/P NEW MOD 45 MIN: CPT | Performed by: PHYSICIAN ASSISTANT

## 2022-12-13 PROCEDURE — 20610 DRAIN/INJ JOINT/BURSA W/O US: CPT | Performed by: PHYSICIAN ASSISTANT

## 2022-12-13 RX ORDER — TRIAMCINOLONE ACETONIDE 40 MG/ML
40 INJECTION, SUSPENSION INTRA-ARTICULAR; INTRAMUSCULAR ONCE
Status: COMPLETED | OUTPATIENT
Start: 2022-12-13 | End: 2022-12-13

## 2022-12-13 RX ADMIN — TRIAMCINOLONE ACETONIDE 40 MG: 40 INJECTION, SUSPENSION INTRA-ARTICULAR; INTRAMUSCULAR at 11:46:00

## 2022-12-13 NOTE — PROCEDURES
Right Knee Intra-articular Injection    Name: Jim Crane   MRN: [id number]  Date: 12/13/2022     Clinical Indications:   Knee Osteoarthritis with symptoms refractory to conservative measures. After informed consent, the injection site was marked, sterilized with topical chlorhexidine antiseptic, and locally anesthetized with skin refrigerant. The patient was situation in a comfortable position. Using sterile technique: 2 mL of 0.5% Bupivicaine, 2 mL of 1% Lidocaine, and 1 mL of 40 mg/ml Triamcinolone was injected utilizing anterolateral approach with a 21 gauge needle. A band-aid was applied. The patient tolerated the procedure well. Disposition:   8 weeks. KHADAR Hardy, KARTHIKEYAN Orthopedic Surgery / Sports Medicine Specialist  [facility]  [address]   [email or web address]  [email or web address]  t: [phone]  o: [phone]  f: [phone]          This note was dictated using Dragon software. While it was briefly proofread prior to completion, some grammatical, spelling, and word choice errors due to dictation may still occur.

## 2023-01-23 ENCOUNTER — OFFICE VISIT (OUTPATIENT)
Dept: ORTHOPEDICS CLINIC | Facility: CLINIC | Age: 88
End: 2023-01-23
Payer: MEDICARE

## 2023-01-23 DIAGNOSIS — M17.11 PRIMARY OSTEOARTHRITIS OF RIGHT KNEE: Primary | ICD-10-CM

## 2023-01-23 PROCEDURE — 99215 OFFICE O/P EST HI 40 MIN: CPT | Performed by: PHYSICIAN ASSISTANT

## 2023-01-23 PROCEDURE — 1125F AMNT PAIN NOTED PAIN PRSNT: CPT | Performed by: PHYSICIAN ASSISTANT

## 2023-02-24 PROCEDURE — 83036 HEMOGLOBIN GLYCOSYLATED A1C: CPT | Performed by: FAMILY MEDICINE

## 2023-02-24 PROCEDURE — 80053 COMPREHEN METABOLIC PANEL: CPT | Performed by: FAMILY MEDICINE

## 2023-02-24 PROCEDURE — 84439 ASSAY OF FREE THYROXINE: CPT | Performed by: FAMILY MEDICINE

## 2023-02-24 PROCEDURE — 80061 LIPID PANEL: CPT | Performed by: FAMILY MEDICINE

## 2023-02-24 PROCEDURE — 84443 ASSAY THYROID STIM HORMONE: CPT | Performed by: FAMILY MEDICINE

## 2023-03-03 ENCOUNTER — TELEPHONE (OUTPATIENT)
Dept: FAMILY MEDICINE CLINIC | Facility: CLINIC | Age: 88
End: 2023-03-03

## 2023-03-03 NOTE — TELEPHONE ENCOUNTER
Called and talked to patient went over medication with her, the assisted care center would like to give her pills all in the morning and then they would only need to do this once a day. So they wanted to know if she can take the donepezil in AM instead of at night.

## 2023-03-03 NOTE — TELEPHONE ENCOUNTER
Pt is calling because she has questions about her meds.  Has confusion about them  donepezil 10 MG Oral Tab  levothyroxine 88 MCG Oral Tab  pantoprazole 40 MG Oral Tab EC  ezetimibe 10 MG Oral Tab  metoprolol tartrate 25 MG Oral Tab  rosuvastatin 40 MG Oral Tab

## 2023-03-03 NOTE — TELEPHONE ENCOUNTER
Patient called back I talked to staff and they did not want to give this in the morning but she was self managing her meds and taking it in the morning.  The order and the son wanted her to take it at night so I told her to let the home manage her medication and she agreed to do this

## 2023-03-13 ENCOUNTER — TELEPHONE (OUTPATIENT)
Dept: FAMILY MEDICINE CLINIC | Facility: CLINIC | Age: 88
End: 2023-03-13

## 2023-03-13 NOTE — TELEPHONE ENCOUNTER
For long term management might recommend she go through urology, but in the immdiate time frame, try taking 2 at bed to see if this helps

## 2023-03-13 NOTE — TELEPHONE ENCOUNTER
Pt is calling because she is taking oxybutynin ER 10 MG Oral Tablet 24 Hr but the last few nights shes had to get up 3-4 times and is struggling with it

## 2023-03-13 NOTE — TELEPHONE ENCOUNTER
Medication managed by urology. LOV with Mauro Quiñones 5/4/22. Call made to patient to discuss. Reports the past 2 nights getting up to urinate 4-5 times in the night. Most times she is urinating on the way to the bathroom. Denies any burning, pain or blood. States Dr. Jorge Mary was original prescriber of medication. Please advise.

## 2023-03-29 ENCOUNTER — TELEPHONE (OUTPATIENT)
Dept: ORTHOPEDICS CLINIC | Facility: CLINIC | Age: 88
End: 2023-03-29

## 2023-03-29 DIAGNOSIS — M25.561 RIGHT KNEE PAIN, UNSPECIFIED CHRONICITY: Primary | ICD-10-CM

## 2023-04-03 ENCOUNTER — HOSPITAL ENCOUNTER (OUTPATIENT)
Dept: GENERAL RADIOLOGY | Age: 88
Discharge: HOME | End: 2023-04-03
Attending: ORTHOPAEDIC SURGERY
Payer: MEDICARE

## 2023-04-03 ENCOUNTER — OFFICE VISIT (OUTPATIENT)
Dept: ORTHOPEDICS CLINIC | Facility: CLINIC | Age: 88
End: 2023-04-03
Payer: MEDICARE

## 2023-04-03 VITALS — BODY MASS INDEX: 29.45 KG/M2 | WEIGHT: 150 LBS | HEIGHT: 60 IN

## 2023-04-03 DIAGNOSIS — M25.561 RIGHT KNEE PAIN, UNSPECIFIED CHRONICITY: ICD-10-CM

## 2023-04-03 DIAGNOSIS — M17.11 PRIMARY OSTEOARTHRITIS OF RIGHT KNEE: Primary | ICD-10-CM

## 2023-04-03 PROCEDURE — 73564 X-RAY EXAM KNEE 4 OR MORE: CPT | Performed by: ORTHOPAEDIC SURGERY

## 2023-04-19 ENCOUNTER — TELEPHONE (OUTPATIENT)
Dept: PAIN CLINIC | Facility: CLINIC | Age: 88
End: 2023-04-19

## 2023-04-19 ENCOUNTER — OFFICE VISIT (OUTPATIENT)
Dept: PAIN CLINIC | Facility: CLINIC | Age: 88
End: 2023-04-19
Payer: MEDICARE

## 2023-04-19 VITALS
DIASTOLIC BLOOD PRESSURE: 58 MMHG | BODY MASS INDEX: 29 KG/M2 | HEART RATE: 68 BPM | WEIGHT: 150 LBS | SYSTOLIC BLOOD PRESSURE: 108 MMHG | OXYGEN SATURATION: 98 %

## 2023-04-19 DIAGNOSIS — M17.11 PRIMARY OSTEOARTHRITIS OF RIGHT KNEE: Primary | ICD-10-CM

## 2023-04-19 DIAGNOSIS — M25.561 CHRONIC PAIN OF RIGHT KNEE: Primary | ICD-10-CM

## 2023-04-19 DIAGNOSIS — G89.29 CHRONIC PAIN OF RIGHT KNEE: Primary | ICD-10-CM

## 2023-04-19 PROCEDURE — 99204 OFFICE O/P NEW MOD 45 MIN: CPT | Performed by: ANESTHESIOLOGY

## 2023-04-19 NOTE — TELEPHONE ENCOUNTER
Question Answer   Anesthesia Type Local   Provider [facility] Procedure Lab   CPT (Hit enter after each entry) DRAIN/INJECT LARGE JOINT/BURSA   Medical clearance requested (will send to Pain Navigator) No   Patient has Medicare coverage?  Yes   Comments (Please list entire procedure name here.) right knee injection with synvisc

## 2023-04-27 ENCOUNTER — HOSPITAL ENCOUNTER (OUTPATIENT)
Facility: HOSPITAL | Age: 88
Setting detail: HOSPITAL OUTPATIENT SURGERY
Discharge: HOME | End: 2023-04-27
Attending: ANESTHESIOLOGY | Admitting: ANESTHESIOLOGY
Payer: MEDICARE

## 2023-04-27 ENCOUNTER — APPOINTMENT (OUTPATIENT)
Dept: GENERAL RADIOLOGY | Facility: HOSPITAL | Age: 88
End: 2023-04-27
Attending: ANESTHESIOLOGY
Payer: MEDICARE

## 2023-04-27 VITALS
WEIGHT: 150 LBS | SYSTOLIC BLOOD PRESSURE: 148 MMHG | TEMPERATURE: 98 F | BODY MASS INDEX: 29.45 KG/M2 | HEIGHT: 60 IN | RESPIRATION RATE: 18 BRPM | OXYGEN SATURATION: 98 % | DIASTOLIC BLOOD PRESSURE: 55 MMHG | HEART RATE: 62 BPM

## 2023-04-27 PROCEDURE — 20610 DRAIN/INJ JOINT/BURSA W/O US: CPT | Performed by: ANESTHESIOLOGY

## 2023-04-27 PROCEDURE — 3E0U3BZ INTRODUCTION OF ANESTHETIC AGENT INTO JOINTS, PERCUTANEOUS APPROACH: ICD-10-PCS | Performed by: ANESTHESIOLOGY

## 2023-04-27 PROCEDURE — 77002 NEEDLE LOCALIZATION BY XRAY: CPT | Performed by: ANESTHESIOLOGY

## 2023-04-27 PROCEDURE — 3E0U33Z INTRODUCTION OF ANTI-INFLAMMATORY INTO JOINTS, PERCUTANEOUS APPROACH: ICD-10-PCS | Performed by: ANESTHESIOLOGY

## 2023-04-27 RX ORDER — INSULIN ASPART 100 [IU]/ML
3 INJECTION, SOLUTION INTRAVENOUS; SUBCUTANEOUS ONCE
Status: DISCONTINUED | OUTPATIENT
Start: 2023-04-27 | End: 2023-04-27

## 2023-04-27 RX ORDER — NICOTINE POLACRILEX 4 MG
15 LOZENGE BUCCAL
Status: DISCONTINUED | OUTPATIENT
Start: 2023-04-27 | End: 2023-04-27

## 2023-04-27 RX ORDER — METHYLPREDNISOLONE ACETATE 40 MG/ML
INJECTION, SUSPENSION INTRA-ARTICULAR; INTRALESIONAL; INTRAMUSCULAR; SOFT TISSUE
Status: DISCONTINUED | OUTPATIENT
Start: 2023-04-27 | End: 2023-04-27

## 2023-04-27 RX ORDER — DEXTROSE MONOHYDRATE 25 G/50ML
50 INJECTION, SOLUTION INTRAVENOUS
Status: DISCONTINUED | OUTPATIENT
Start: 2023-04-27 | End: 2023-04-27

## 2023-04-27 RX ORDER — NICOTINE POLACRILEX 4 MG
30 LOZENGE BUCCAL
Status: DISCONTINUED | OUTPATIENT
Start: 2023-04-27 | End: 2023-04-27

## 2023-04-27 RX ORDER — LIDOCAINE HYDROCHLORIDE 10 MG/ML
INJECTION, SOLUTION EPIDURAL; INFILTRATION; INTRACAUDAL; PERINEURAL
Status: DISCONTINUED | OUTPATIENT
Start: 2023-04-27 | End: 2023-04-27

## 2023-04-27 NOTE — DISCHARGE INSTRUCTIONS
Home Care Instructions Following Your Pain Procedure     Jamey Hanna,  It has been a pleasure to have you as our patient. To help you at home, you must follow these general discharge instructions. We will review these with you before you are discharged. It is our hope that you have a complete and uneventful recovery from our procedure. General Instructions:  What to Expect:  Bandages from your procedure today can be removed when you get home. Please avoid soaking and/or swimming for 24 hours. Showering is okay  It is normal to have increased pain symptoms and/or pain at injection site for up to 3-5 days after procedure, you can use heat or ice (20 minutes on 20 minutes off) for comfort. You may experience some temporary side effects which may include restlessness or insomnia, flushing of the face, or heart palpitations. Please contact the provider if these symptoms do not resolve within 3-4 days. Lightheadedness or nausea may occur and should resolve within 24 to 48 hours. If you develop a headache after treatment, rest, drink fluids (with caffeine, if possible) and take mild over-the-counter pain medication. If the headache does not improve with the above treatment, contact the physician. Home Medications:  Resume all previously prescribed medication. Please avoid taking NSAIDs (Non-Steriodal Anti-Inflammatory Drugs) such as:  Ibuprofen ( Advil, Motrin) Aleve (Naproxen), Diclofenac, Meloxicam for 6 hours after procedure. If you are on Coumadin (Warfarin) or any other anti-coagulant (or \"blood thinning\") medication such as Plavix (Clopidogrel), Xarelto (Rivaroxaban), Eliquis (Apixaban), Effient (Prasugrel) etc., restart on the following day from the procedure unless otherwise directed by your provider. If you are a diabetic, please increase the frequency of your glucose monitoring after the procedure as steroids may cause a temporary (2-3 day) increase in your blood sugar.   Contact your primary care physician if your blood sugar remains elevated as you may require some medication adjustment. Diet:  Resume your regular diet as tolerated. Activity: We recommend that you relax and rest during the rest of your procedure day. If you feel weakness in your arms or legs do not drive. Follow-up Appointment  Please schedule a follow-up visit within 3 to 4 weeks after your last procedure date. Question or Concerns:  Feel free to call our office with any questions or concerns at [phone] (option #2)    Ailyn Bonilla  Thank you for coming to [facility] for your procedure. The nurses try very hard to make sure you receive the best care possible. Your trust in them as well as us is greatly appreciated.     Thanks so much,   Dr. Boateng

## 2023-04-27 NOTE — OPERATIVE REPORT
[facility]  Operative Report  2023     Tate Martinez Patient Status:  Hospital Outpatient Surgery    3/15/1935 MRN [id number]   Location [address] [address] Attending James Alfred MD   Hosp Day # 0 PCP Meg Felton MD     Indication: Marga Goldmann is a 81year old female with osteoarthritis of the knee    Imaging: Knee x-ray reviewed with end-stage osteoarthritis    Preoperative Diagnosis:  Primary osteoarthritis of right knee [M17.11]    Postoperative Diagnosis: Same as above. Procedure performed: [facility] JOINT INJECTION RIGHT KNEE with local     Anesthesia: Local .    EBL: Less than 1 ml. Procedure Description:   After reviewing the patient's history and performing a focused physical examination, the diagnosis was confirmed and contraindications such as infection and coagulopathy were ruled out. Following review of potential side effects and complications, including but not necessarily limited to infection, allergic reaction, local tissue breakdown, nerve injury, and paresis, the patient indicated they understood and agreed to proceed. After obtaining the informed consent, the patient was brought to the procedure room and monitored. The patient was brought to the procedure room and positioned supine with affected knee supported. Fluoroscopy of the knee joint was taken in AP view. The lateral aspect of the patellar tendon was marked. A skin wheel was raised with 1% lidocaine. Subsequently a 22-gauge 3.5 Quincke spinal needle was introduced under direct fluoroscopy under AP view. The needle's position was confirmed by AP and lateral fluoroscopic view. Thereafter, 1 mL Omnipaque 180 was injected. After a good arthrogram was obtained, 2 mL of Synvisc was injected after repeated aspiration. The needle was then flushed with 1 mL 1% lidocaine. The patient tolerated the procedure very well and was discharged home after recovery criteria were met.       Complications: None.    Follow up:  In clinic as needed    Roxanne Huntley MD

## 2023-04-28 ENCOUNTER — TELEPHONE (OUTPATIENT)
Dept: PAIN CLINIC | Facility: CLINIC | Age: 88
End: 2023-04-28

## 2023-04-28 NOTE — TELEPHONE ENCOUNTER
Camilla called placed to patient for post procedure follow up. Patient's daughter, Scooter Cabrera, answered phone and stated that she is unsure how pt is doing because pt lives in assisted living facility but Scooter Cabrera assumes pt is doing well because if not she would have called her. Scooter Cabrera stated that Pt's son Meek Swanson is going to visit pt later today. Advised to give our office a call if any questions or concerns arise.     Procedure: [facility] JOINT INJECTION RIGHT KNEE with local  Date: 4/27/23  Follow up Visit Scheduled: 5/12/23

## 2023-05-04 ENCOUNTER — OFFICE VISIT (OUTPATIENT)
Dept: SURGERY | Facility: CLINIC | Age: 88
End: 2023-05-04

## 2023-05-04 DIAGNOSIS — R82.90 URINE FINDING: ICD-10-CM

## 2023-05-04 DIAGNOSIS — N39.41 URGE INCONTINENCE: ICD-10-CM

## 2023-05-04 DIAGNOSIS — N32.81 OAB (OVERACTIVE BLADDER): Primary | ICD-10-CM

## 2023-05-04 LAB
APPEARANCE: CLEAR
BILIRUBIN: NEGATIVE
GLUCOSE (URINE DIPSTICK): NEGATIVE MG/DL
KETONES (URINE DIPSTICK): NEGATIVE MG/DL
LEUKOCYTES: NEGATIVE
MULTISTIX LOT#: ABNORMAL NUMERIC
NITRITE, URINE: NEGATIVE
PH, URINE: 6.5 (ref 4.5–8)
PROTEIN (URINE DIPSTICK): 100 MG/DL
SPECIFIC GRAVITY: 1.02 (ref 1–1.03)
URINE-COLOR: YELLOW
UROBILINOGEN,SEMI-QN: 0.2 MG/DL (ref 0–1.9)

## 2023-05-04 PROCEDURE — 99213 OFFICE O/P EST LOW 20 MIN: CPT | Performed by: PHYSICIAN ASSISTANT

## 2023-05-04 PROCEDURE — 81003 URINALYSIS AUTO W/O SCOPE: CPT | Performed by: PHYSICIAN ASSISTANT

## 2023-05-16 ENCOUNTER — TELEPHONE (OUTPATIENT)
Dept: SURGERY | Facility: CLINIC | Age: 88
End: 2023-05-16

## 2023-05-16 ENCOUNTER — TELEPHONE (OUTPATIENT)
Dept: FAMILY MEDICINE CLINIC | Facility: CLINIC | Age: 88
End: 2023-05-16

## 2023-05-22 ENCOUNTER — TELEPHONE (OUTPATIENT)
Dept: PAIN CLINIC | Facility: CLINIC | Age: 88
End: 2023-05-22

## 2023-05-22 ENCOUNTER — OFFICE VISIT (OUTPATIENT)
Dept: PAIN CLINIC | Facility: CLINIC | Age: 88
End: 2023-05-22
Payer: MEDICARE

## 2023-05-22 VITALS
SYSTOLIC BLOOD PRESSURE: 132 MMHG | DIASTOLIC BLOOD PRESSURE: 68 MMHG | WEIGHT: 150 LBS | OXYGEN SATURATION: 97 % | BODY MASS INDEX: 29 KG/M2 | HEART RATE: 54 BPM

## 2023-05-22 DIAGNOSIS — M25.561 CHRONIC PAIN OF RIGHT KNEE: ICD-10-CM

## 2023-05-22 DIAGNOSIS — M17.11 PRIMARY OSTEOARTHRITIS OF RIGHT KNEE: Primary | ICD-10-CM

## 2023-05-22 DIAGNOSIS — G89.29 CHRONIC PAIN OF RIGHT KNEE: ICD-10-CM

## 2023-05-22 PROCEDURE — 99214 OFFICE O/P EST MOD 30 MIN: CPT | Performed by: ANESTHESIOLOGY

## 2023-05-22 NOTE — TELEPHONE ENCOUNTER
Question Answer   Anesthesia Type Local   Provider [facility] Procedure Lab   CPT (Hit enter after each entry) NJX AA&/STRD Uus 6 clearance requested (will send to Pain Navigator) No   Patient has Medicare coverage?  Yes   Comments (Please list entire procedure name here.) right genicular nerve block

## 2023-05-22 NOTE — PROGRESS NOTES
Last procedure: SYNVISC JOINT INJECTION RIGHT KNEE with local  Date: 04/27/23  Percentage of relief obtained: 100%  Duration of relief: 1 week    Current Pain Score: 8-9

## 2023-06-06 ENCOUNTER — APPOINTMENT (OUTPATIENT)
Dept: GENERAL RADIOLOGY | Facility: HOSPITAL | Age: 88
End: 2023-06-06
Attending: ANESTHESIOLOGY
Payer: MEDICARE

## 2023-06-06 ENCOUNTER — HOSPITAL ENCOUNTER (OUTPATIENT)
Facility: HOSPITAL | Age: 88
Setting detail: HOSPITAL OUTPATIENT SURGERY
Discharge: HOME | End: 2023-06-06
Attending: ANESTHESIOLOGY | Admitting: ANESTHESIOLOGY
Payer: MEDICARE

## 2023-06-06 VITALS
WEIGHT: 150 LBS | HEART RATE: 62 BPM | SYSTOLIC BLOOD PRESSURE: 151 MMHG | TEMPERATURE: 97 F | RESPIRATION RATE: 17 BRPM | DIASTOLIC BLOOD PRESSURE: 62 MMHG | BODY MASS INDEX: 29.45 KG/M2 | HEIGHT: 60 IN | OXYGEN SATURATION: 96 %

## 2023-06-06 PROCEDURE — 3E0T33Z INTRODUCTION OF ANTI-INFLAMMATORY INTO PERIPHERAL NERVES AND PLEXI, PERCUTANEOUS APPROACH: ICD-10-PCS | Performed by: ANESTHESIOLOGY

## 2023-06-06 PROCEDURE — 3E0T3BZ INTRODUCTION OF ANESTHETIC AGENT INTO PERIPHERAL NERVES AND PLEXI, PERCUTANEOUS APPROACH: ICD-10-PCS | Performed by: ANESTHESIOLOGY

## 2023-06-06 PROCEDURE — 64454 NJX AA&/STRD GNCLR NRV BRNCH: CPT | Performed by: ANESTHESIOLOGY

## 2023-06-06 RX ORDER — BUPIVACAINE HYDROCHLORIDE 5 MG/ML
INJECTION, SOLUTION EPIDURAL; INTRACAUDAL
Status: DISCONTINUED | OUTPATIENT
Start: 2023-06-06 | End: 2023-06-06

## 2023-06-06 RX ORDER — METHYLPREDNISOLONE ACETATE 40 MG/ML
INJECTION, SUSPENSION INTRA-ARTICULAR; INTRALESIONAL; INTRAMUSCULAR; SOFT TISSUE
Status: DISCONTINUED | OUTPATIENT
Start: 2023-06-06 | End: 2023-06-06

## 2023-06-06 RX ORDER — LIDOCAINE HYDROCHLORIDE 10 MG/ML
INJECTION, SOLUTION EPIDURAL; INFILTRATION; INTRACAUDAL; PERINEURAL
Status: DISCONTINUED | OUTPATIENT
Start: 2023-06-06 | End: 2023-06-06

## 2023-06-06 NOTE — DISCHARGE INSTRUCTIONS
Home Care Instructions Following Your Pain Procedure     Reid Bradley,  It has been a pleasure to have you as our patient. To help you at home, you must follow these general discharge instructions. We will review these with you before you are discharged. It is our hope that you have a complete and uneventful recovery from our procedure. General Instructions:  What to Expect:  Bandages from your procedure today can be removed when you get home. Please avoid soaking and/or swimming for 24 hours. Showering is okay  It is normal to have increased pain symptoms and/or pain at injection site for up to 3-5 days after procedure, you can use heat or ice (20 minutes on 20 minutes off) for comfort. You may experience some temporary side effects which may include restlessness or insomnia, flushing of the face, or heart palpitations. Please contact the provider if these symptoms do not resolve within 3-4 days. Lightheadedness or nausea may occur and should resolve within 24 to 48 hours. If you develop a headache after treatment, rest, drink fluids (with caffeine, if possible) and take mild over-the-counter pain medication. If the headache does not improve with the above treatment, contact the physician. Home Medications:  Resume all previously prescribed medication. Please avoid taking NSAIDs (Non-Steriodal Anti-Inflammatory Drugs) such as:  Ibuprofen ( Advil, Motrin) Aleve (Naproxen), Diclofenac, Meloxicam for 6 hours after procedure. If you are on Coumadin (Warfarin) or any other anti-coagulant (or \"blood thinning\") medication such as Plavix (Clopidogrel), Xarelto (Rivaroxaban), Eliquis (Apixaban), Effient (Prasugrel) etc., restart on the following day from the procedure unless otherwise directed by your provider. If you are a diabetic, please increase the frequency of your glucose monitoring after the procedure as steroids may cause a temporary (2-3 day) increase in your blood sugar.   Contact your primary care physician if your blood sugar remains elevated as you may require some medication adjustment. Diet:  Resume your regular diet as tolerated. Activity: We recommend that you relax and rest during the rest of your procedure day. If you feel weakness in your arms or legs do not drive. Follow-up Appointment  Please schedule a follow-up visit within 3 to 4 weeks after your last procedure date. Question or Concerns:  Feel free to call our office with any questions or concerns at [phone] (option #2)    Charlotte Torrez  Thank you for coming to [facility] for your procedure. The nurses try very hard to make sure you receive the best care possible. Your trust in them as well as us is greatly appreciated.     Thanks so much,   Dr. Triana Favor

## 2023-06-06 NOTE — OPERATIVE REPORT
[facility]  Operative Report  2023     Luly Feliz Patient Status:  Hospital Outpatient Surgery    3/15/1935 MRN [id number]   Location [address] [address] Attending Marie Hendricks MD   [address] [facility] Day # 0 PCP Uriel Duran MD     Indication: Caitlin Madrid is a 81year old female with osteoarthritis of the right knee    Imaging: Knee x-ray reviewed    Preoperative Diagnosis:  Primary osteoarthritis of right knee [M17.11]    Postoperative Diagnosis: Same as above. Procedure performed: RIGHT GENICULAR NERVE BLOCK WITH LOCAL    Anesthesia: Local     EBL: Less than 1 ml. Procedure Description:  After reviewing the patient's history and performing a focused physical examination, the diagnosis and positive previous diagnostic tests were confirmed and contraindications such as infection and coagulopathy were ruled out. Following review of allergies and potential side effects and complications, including but not necessarily limited to infection, allergic reaction, local tissue breakdown, nerve injury,  and paresis, the patient consented for the procedure. The patient was brought to the procedure room in the supine position. The operative knee was then identified and prepped and draped in the usual sterile fashion. AP imaging was used to identify the path of the superior medial, superior lateral, inferior medial genicular nerves. The overlying soft tissue was then anesthetized with 3 cc 1% lidocaine at each location. Subsequently, a 3.5 inch 22-gauge spinal needle was advanced imaging guidance to contact bone along the path of the superior medial, superior lateral, and inferior medial genicular nerves. Needle position was confirmed on both AP and lateral views. After negative aspiration for heme, total mixture of 40 mg of Depo-Medrol with 9 cc of 0.5% bupivacaine was then evenly divided amongst the 3 sites.   The needle was then flushed with 1 cc 1% lidocaine, re-styletted and removed with tip intact. The patient tolerated the procedure very well without significant immediate complication. Complications: None. Follow up: The patient was followed in the pain clinic as needed basis.           Luca Savage MD

## 2023-06-07 ENCOUNTER — TELEPHONE (OUTPATIENT)
Dept: PAIN CLINIC | Facility: CLINIC | Age: 88
End: 2023-06-07

## 2023-06-07 NOTE — TELEPHONE ENCOUNTER
Courtesy called placed to patient for post procedure follow up. Patient stated she does not have any questions and she is doing ok. Pt verbalized understanding to call with any questions or concerns.       Procedure: R Genicular NB  Date: 6/6/23  Follow up Visit Scheduled: 6/21/23 @ 9040 w/ Dr Jorge Kasper

## 2023-06-21 ENCOUNTER — TELEPHONE (OUTPATIENT)
Dept: PAIN CLINIC | Facility: CLINIC | Age: 88
End: 2023-06-21

## 2023-06-21 ENCOUNTER — OFFICE VISIT (OUTPATIENT)
Dept: PAIN CLINIC | Facility: CLINIC | Age: 88
End: 2023-06-21
Payer: MEDICARE

## 2023-06-21 VITALS
SYSTOLIC BLOOD PRESSURE: 124 MMHG | BODY MASS INDEX: 29 KG/M2 | HEART RATE: 78 BPM | WEIGHT: 150 LBS | OXYGEN SATURATION: 97 % | DIASTOLIC BLOOD PRESSURE: 78 MMHG

## 2023-06-21 DIAGNOSIS — M25.561 CHRONIC PAIN OF RIGHT KNEE: Primary | ICD-10-CM

## 2023-06-21 DIAGNOSIS — G89.29 CHRONIC PAIN OF RIGHT KNEE: Primary | ICD-10-CM

## 2023-06-21 PROCEDURE — 99214 OFFICE O/P EST MOD 30 MIN: CPT | Performed by: ANESTHESIOLOGY

## 2023-06-21 NOTE — PROGRESS NOTES
Last procedure: RIGHT GENICULAR NERVE BLOCK WITH LOCAL  Date: 06/06/23  Percentage of relief obtained: 100%  Duration of relief: 1 week    Current Pain Score: 5

## 2023-06-21 NOTE — TELEPHONE ENCOUNTER
Question Answer   Anesthesia Type Local   Provider [facility] Procedure Lab   CPT (Hit enter after each entry) DSTRJ HAILE [facility] NR   Medical clearance requested (will send to Pain Navigator) No   Patient has Medicare coverage?  Yes   Comments (Please list entire procedure name here.) right genicular nerve RFA

## 2023-08-01 ENCOUNTER — APPOINTMENT (OUTPATIENT)
Dept: GENERAL RADIOLOGY | Facility: HOSPITAL | Age: 88
End: 2023-08-01
Attending: ANESTHESIOLOGY
Payer: MEDICARE

## 2023-08-01 ENCOUNTER — HOSPITAL ENCOUNTER (OUTPATIENT)
Facility: HOSPITAL | Age: 88
Setting detail: HOSPITAL OUTPATIENT SURGERY
Discharge: HOME | End: 2023-08-01
Attending: ANESTHESIOLOGY | Admitting: ANESTHESIOLOGY
Payer: MEDICARE

## 2023-08-01 VITALS
HEART RATE: 70 BPM | TEMPERATURE: 99 F | SYSTOLIC BLOOD PRESSURE: 163 MMHG | OXYGEN SATURATION: 95 % | DIASTOLIC BLOOD PRESSURE: 64 MMHG | RESPIRATION RATE: 18 BRPM

## 2023-08-01 PROCEDURE — 64454 NJX AA&/STRD GNCLR NRV BRNCH: CPT | Performed by: ANESTHESIOLOGY

## 2023-08-01 PROCEDURE — 015D3ZZ DESTRUCTION OF FEMORAL NERVE, PERCUTANEOUS APPROACH: ICD-10-PCS | Performed by: ANESTHESIOLOGY

## 2023-08-01 RX ORDER — NICOTINE POLACRILEX 4 MG
15 LOZENGE BUCCAL
Status: DISCONTINUED | OUTPATIENT
Start: 2023-08-01 | End: 2023-08-01

## 2023-08-01 RX ORDER — NICOTINE POLACRILEX 4 MG
30 LOZENGE BUCCAL
Status: DISCONTINUED | OUTPATIENT
Start: 2023-08-01 | End: 2023-08-01

## 2023-08-01 RX ORDER — METHYLPREDNISOLONE ACETATE 40 MG/ML
INJECTION, SUSPENSION INTRA-ARTICULAR; INTRALESIONAL; INTRAMUSCULAR; SOFT TISSUE
Status: DISCONTINUED | OUTPATIENT
Start: 2023-08-01 | End: 2023-08-01

## 2023-08-01 RX ORDER — INSULIN ASPART 100 [IU]/ML
3 INJECTION, SOLUTION INTRAVENOUS; SUBCUTANEOUS ONCE
Status: DISCONTINUED | OUTPATIENT
Start: 2023-08-01 | End: 2023-08-01

## 2023-08-01 RX ORDER — LIDOCAINE HYDROCHLORIDE 10 MG/ML
INJECTION, SOLUTION EPIDURAL; INFILTRATION; INTRACAUDAL; PERINEURAL
Status: DISCONTINUED | OUTPATIENT
Start: 2023-08-01 | End: 2023-08-01

## 2023-08-01 RX ORDER — BUPIVACAINE HYDROCHLORIDE 5 MG/ML
INJECTION, SOLUTION EPIDURAL; INTRACAUDAL
Status: DISCONTINUED | OUTPATIENT
Start: 2023-08-01 | End: 2023-08-01

## 2023-08-01 RX ORDER — DEXTROSE MONOHYDRATE 25 G/50ML
50 INJECTION, SOLUTION INTRAVENOUS
Status: DISCONTINUED | OUTPATIENT
Start: 2023-08-01 | End: 2023-08-01

## 2023-08-01 NOTE — DISCHARGE INSTRUCTIONS
Home Care Instructions Following Your Pain Procedure     Jamey Hanna,  It has been a pleasure to have you as our patient. To help you at home, you must follow these general discharge instructions. We will review these with you before you are discharged. It is our hope that you have a complete and uneventful recovery from our procedure. General Instructions:  What to Expect:  Bandages from your procedure today can be removed when you get home. Please avoid soaking and/or swimming for 24 hours. Showering is okay  It is normal to have increased pain symptoms and/or pain at injection site for up to 3-5 days after procedure, you can use heat or ice (20 minutes on 20 minutes off) for comfort. You may experience some temporary side effects which may include restlessness or insomnia, flushing of the face, or heart palpitations. Please contact the provider if these symptoms do not resolve within 3-4 days. Lightheadedness or nausea may occur and should resolve within 24 to 48 hours. If you develop a headache after treatment, rest, drink fluids (with caffeine, if possible) and take mild over-the-counter pain medication. If the headache does not improve with the above treatment, contact the physician. Home Medications:  Resume all previously prescribed medication. Please avoid taking NSAIDs (Non-Steriodal Anti-Inflammatory Drugs) such as:  Ibuprofen ( Advil, Motrin) Aleve (Naproxen), Diclofenac, Meloxicam for 6 hours after procedure. If you are on Coumadin (Warfarin) or any other anti-coagulant (or \"blood thinning\") medication such as Plavix (Clopidogrel), Xarelto (Rivaroxaban), Eliquis (Apixaban), Effient (Prasugrel) etc., restart on the following day from the procedure unless otherwise directed by your provider. If you are a diabetic, please increase the frequency of your glucose monitoring after the procedure as steroids may cause a temporary (2-3 day) increase in your blood sugar.   Contact your primary care physician if your blood sugar remains elevated as you may require some medication adjustment. Diet:  Resume your regular diet as tolerated. Activity: We recommend that you relax and rest during the rest of your procedure day. If you feel weakness in your arms or legs do not drive. Follow-up Appointment  Please schedule a follow-up visit within 3 to 4 weeks after your last procedure date. Question or Concerns:  Feel free to call our office with any questions or concerns at [phone] (option #2)    Earnestine Chirinos  Thank you for coming to [facility] for your procedure. The nurses try very hard to make sure you receive the best care possible. Your trust in them as well as us is greatly appreciated.     Thanks so much,   Dr. Elspeth Hammans

## 2023-08-01 NOTE — H&P
History & Physical Examination    Patient Name: Kathia Cox  MRN: [id number]  CSN: [id number]  YOB: 1935    Pre-Operative Diagnosis:  Chronic pain of right knee [M25.561, G89.29]    Present Illness: Patient with chronic knee pain    Mirabegron ER (MYRBETRIQ) 50 MG Oral Tablet 24 Hr, Take 1 tablet (50 mg total) by mouth daily. , Disp: 90 tablet, Rfl: 3, 8/1/2023  donepezil 10 MG Oral Tab, Take 1 tablet (10 mg total) by mouth nightly., Disp: 90 tablet, Rfl: 3, 8/1/2023  levothyroxine 88 MCG Oral Tab, Take 1 tablet (88 mcg total) by mouth before breakfast., Disp: 90 tablet, Rfl: 1, 8/1/2023  pantoprazole 40 MG Oral Tab EC, Take 1 tablet (40 mg total) by mouth in the morning., Disp: 90 tablet, Rfl: 3, 8/1/2023  ezetimibe 10 MG Oral Tab, Take 1 tablet (10 mg total) by mouth daily. , Disp: 90 tablet, Rfl: 3, 7/31/2023  metoprolol tartrate 25 MG Oral Tab, 1 po q day, Disp: 90 tablet, Rfl: 3, 8/1/2023  rosuvastatin 40 MG Oral Tab, Take 1 tablet (40 mg total) by mouth daily. , Disp: 90 tablet, Rfl: 3, 7/31/2023  Multiple Vitamin (ONE-DAILY MULTI VITAMINS) Oral Tab, Take 1 tablet by mouth 3 (three) times a week., Disp: , Rfl: , 7/31/2023  aspirin 81 MG Oral Tab, Take 1 tablet (81 mg total) by mouth daily. , Disp: , Rfl: , 8/1/2023      insulin aspart (NovoLOG) 100 Units/mL vial 3 Units, 3 Units, Subcutaneous, Once  glucose (Dex4) 15 GM/59ML oral liquid 15 g, 15 g, Oral, Q15 Min PRN   Or  glucose (Glutose) 40% oral gel 15 g, 15 g, Oral, Q15 Min PRN   Or  glucose-vitamin C (Dex-4) chewable tab 4 tablet, 4 tablet, Oral, Q15 Min PRN   Or  dextrose 50% injection 50 mL, 50 mL, Intravenous, Q15 Min PRN   Or  glucose (Dex4) 15 GM/59ML oral liquid 30 g, 30 g, Oral, Q15 Min PRN   Or  glucose (Glutose) 40% oral gel 30 g, 30 g, Oral, Q15 Min PRN   Or  glucose-vitamin C (Dex-4) chewable tab 8 tablet, 8 tablet, Oral, Q15 Min PRN        Allergies: No Known Allergies    Past Medical History:   Diagnosis Date    Atherosclerosis of coronary artery     Bursitis of shoulder, right     Chest pain     Coronary atherosclerosis  2012    Disorder of thyroid     Esophageal reflux 2012    Essential hypertension     Hyperlipidemia 2012    Hypertension, benign     Hypothyroidism 2012    S/P CABG x 5     Type II  diabetes mellitus  2012    Unspecified vitamin D deficiency 2012     Past Surgical History:   Procedure Laterality Date    ANGIOPLASTY (CORONARY)  02    RCA graft stent    ANGIOPLASTY (CORONARY)  04    stent RCA graft, IABP    CABG  1995    x5    COLONOSCOPY  2004    HYSTERECTOMY  1985    partial hystero. LEG/ANKLE SURGERY PROC UNLISTED      angioplasty of left leg    LEG/ANKLE SURGERY PROC UNLISTED  2002    angioplasty of right leg    SURGICAL STENT      Coronary Artery Stent    TONSILLECTOMY  89    TOTAL ABDOM HYSTERECTOMY  1985     Family History   Problem Relation Age of Onset    Heart Disease Brother         Triple CABG    Heart Disease Mother      Social History    Tobacco Use      Smoking status: Former        Types: Cigarettes        Quit date: 1995        Years since quittin.6      Smokeless tobacco: Never    Alcohol use: Not Currently      Alcohol/week: 0.0 - 1.0 standard drinks of alcohol      Comment: 1 per month      SYSTEM Check if Review is Normal Check if Physical Exam is Normal If not normal, please explain:   HEENT [x ] [x ]    NECK & BACK [x ] [x ]    HEART [x ] [x ]    LUNGS [x ] [x ]    ABDOMEN [x ] [x ]    UROGENITAL [x ] [x ]    EXTREMITIES [x ] [x ]    OTHER        [ x ] I have discussed the risks and benefits and alternatives with the patient/family. They understand and agree to proceed with plan of care. [ x ] I have reviewed the History and Physical done within the last 30 days. Any changes noted above.     Shantel Foster MD

## 2023-08-01 NOTE — OPERATIVE REPORT
[facility]  Operative Report  2023     Tate Martinez Patient Status:  Hospital Outpatient Surgery    3/15/1935 MRN [id number]   Location [address] [address] Attending James Alfred MD   Hosp Day # 0 PCP Meg Felton MD     Indication: Marga Goldmann is a 81year old female with chronic knee pain    Imaging: X-ray reviewed    Preoperative Diagnosis:  Chronic pain of right knee [M25.561, G89.29]    Postoperative Diagnosis: Same as above. Procedure performed: RIGHT GENICULAR NERVE RFA  with local     Anesthesia: Local and IV Sedation. EBL: Less than 1 ml. Procedure Description:  After reviewing the patient's history and performing a focused physical examination, the diagnosis and positive previous diagnostic tests were confirmed and contraindications such as infection and coagulopathy were ruled out. Following review of allergies and potential side effects and complications, including but not necessarily limited to infection, allergic reaction, local tissue breakdown, nerve injury,  and paresis, the patient consented for the procedure. The patient was brought to the procedure room in the supine position. The operative knee was then identified and prepped and draped in the usual sterile fashion. AP imaging was used to identify the path of the superior medial, superior lateral, inferior medial genicular nerves. The overlying soft tissue was then anesthetized with 3 cc 1% lidocaine at each location. Subsequently, a 10 mm active tip radiofrequency ablation needle was advanced imaging guidance to contact bone along the path of the superior medial, superior lateral, and inferior medial genicular nerves. Needle position was confirmed on both AP and lateral views. Motor testing was conducted which was negative for motor involvement. Subsequently, radiofrequency ablation of the genicular nerves were conducted at [de-identified] [facility] for 90 seconds.   After negative aspiration for heme, total mixture of 40 mg of Depo-Medrol with 9 cc of 0.5% bupivacaine was then evenly divided amongst the 3 sites. The needle was then flushed with 1 cc 1% lidocaine, re-styletted and removed with tip intact. The patient tolerated the procedure very well without significant immediate complication. Complications: None. Follow up: The patient was followed in the pain clinic as needed basis.           Soni Beavers MD

## 2023-08-02 ENCOUNTER — TELEPHONE (OUTPATIENT)
Dept: PAIN CLINIC | Facility: CLINIC | Age: 88
End: 2023-08-02

## 2023-08-02 NOTE — TELEPHONE ENCOUNTER
Follow-up call post pain procedure.      Mailbox not set up     Procedure: RIGHT GENICULAR NERVE RFA  with local   Date: 08/01/23  Follow up Visit Scheduled:

## 2023-08-18 DIAGNOSIS — E03.9 ACQUIRED HYPOTHYROIDISM: ICD-10-CM

## 2023-08-22 RX ORDER — LEVOTHYROXINE SODIUM 88 UG/1
88 TABLET ORAL
Qty: 90 TABLET | Refills: 1 | Status: SHIPPED | OUTPATIENT
Start: 2023-08-22

## 2023-08-22 NOTE — TELEPHONE ENCOUNTER
Requested Prescriptions     Pending Prescriptions Disp Refills    LEVOTHYROXINE 88 MCG Oral Tab [Pharmacy Med Name: LEVOTHYROXINE 0.088MG (88MCG) TAB] 90 tablet 1     Sig: TAKE 1 TABLET(88 MCG) BY MOUTH BEFORE BREAKFAST     LOV 6/23/2023     Patient was asked to follow-up in: Follow-up not documented in note    Appointment scheduled: 10/9/2023 Sandra Guallpa MD     Medication refilled per protocol.

## 2023-08-25 ENCOUNTER — VIRTUAL PHONE E/M (OUTPATIENT)
Dept: PAIN CLINIC | Facility: CLINIC | Age: 88
End: 2023-08-25
Payer: MEDICARE

## 2023-08-25 DIAGNOSIS — M25.561 CHRONIC PAIN OF RIGHT KNEE: Primary | ICD-10-CM

## 2023-08-25 DIAGNOSIS — G89.29 CHRONIC PAIN OF RIGHT KNEE: Primary | ICD-10-CM

## 2023-08-25 PROCEDURE — 99443 PHONE E/M BY PHYS 21-30 MIN: CPT | Performed by: ANESTHESIOLOGY

## 2023-08-25 NOTE — PROGRESS NOTES
Virtual Telephone Check-In    Farrah Hussein verbally consents to a Virtual/Telephone Check-In visit on 08/25/23. Patient has been referred to the [facility] website at [email or web address] to review the yearly Consent to Treat document. Patient understands and accepts financial responsibility for any deductible, co-insurance and/or co-pays associated with this service. Duration of the service: 30 minutes      Summary of topics discussed: The patient is a 28-year-old female with moderate to severe lateral compartment disease on the x-ray. Patient is following up after genicular nerve block. States that some days are better than others but overall feels that her pain symptoms significantly improved. Given that she has failed injections and genicular nerve RFA, definitive therapy would be total knee arthroplasty. However, patient's son expressed concerns with her ability to perform PT afterwards especially given findings of dementia. I agree that TKA is not absolutely necessary. Patient has osteoarthritic changes in the knee which is failed conservative management. Will defer to patient and family regarding neck steps.       Payton Chauhan MD

## 2023-08-29 ENCOUNTER — TELEPHONE (OUTPATIENT)
Dept: FAMILY MEDICINE CLINIC | Facility: CLINIC | Age: 88
End: 2023-08-29

## 2023-09-19 ENCOUNTER — TELEPHONE (OUTPATIENT)
Dept: SURGERY | Facility: CLINIC | Age: 88
End: 2023-09-19

## 2023-09-19 ENCOUNTER — OFFICE VISIT (OUTPATIENT)
Dept: SURGERY | Facility: CLINIC | Age: 88
End: 2023-09-19

## 2023-09-19 DIAGNOSIS — R32 URINARY INCONTINENCE, UNSPECIFIED TYPE: Primary | ICD-10-CM

## 2023-09-19 PROCEDURE — 51798 US URINE CAPACITY MEASURE: CPT | Performed by: PHYSICIAN ASSISTANT

## 2023-09-19 PROCEDURE — 99213 OFFICE O/P EST LOW 20 MIN: CPT | Performed by: PHYSICIAN ASSISTANT

## 2023-09-19 NOTE — PATIENT INSTRUCTIONS
No fluids 3 hours prior to bed. Start taking the myrbetriq at ~12    Please call me if symptoms are not improving in the next 2-3 weeks.

## 2023-09-20 ENCOUNTER — TELEPHONE (OUTPATIENT)
Dept: FAMILY MEDICINE CLINIC | Facility: CLINIC | Age: 88
End: 2023-09-20

## 2023-09-20 RX ORDER — OXYBUTYNIN CHLORIDE 10 MG/1
10 TABLET, EXTENDED RELEASE ORAL DAILY
COMMUNITY
Start: 2023-08-18

## 2023-09-20 NOTE — TELEPHONE ENCOUNTER
RN called daughter in response to her call. Note, patient was last seen on 9/19/23. All questions answered and she agreed to update if she has questions. \" Last seen in May 2023 for worsening leakage over ~2 months prior. Urine culture via straight cath was negative. Was started on Myrbetriq 50mg daily (stopped oxybutynin). \"    Patient should continue with Donepezil as prescribed by Dr Lucy Alfred. Donepezil has no bladder side effect. Instructed to avoid fluids after dinner, sip of water with meds at bedtime, empty bladder before bedtime and double void if needed.  She agreed

## 2023-09-20 NOTE — TELEPHONE ENCOUNTER
Called and talked to daughter went over some non specific medication questions and suggested they make sure to update the HIPAA form when she comes in to be seen.

## 2023-10-02 ENCOUNTER — TELEPHONE (OUTPATIENT)
Dept: FAMILY MEDICINE CLINIC | Facility: CLINIC | Age: 88
End: 2023-10-02

## 2023-10-02 DIAGNOSIS — E78.2 MIXED HYPERLIPIDEMIA: ICD-10-CM

## 2023-10-02 DIAGNOSIS — R73.01 ELEVATED FASTING GLUCOSE: ICD-10-CM

## 2023-10-02 DIAGNOSIS — E03.9 ACQUIRED HYPOTHYROIDISM: Primary | ICD-10-CM

## 2023-10-02 NOTE — TELEPHONE ENCOUNTER
Please enter lab orders for the patient's upcoming physical appointment. Physical scheduled: Your appointments       Date & Time Appointment Department [facility])    Oct 09, 2023 10:00 AM CDT Medicare Annual Well Visit with Clare Olivier MD [address], [address] [address], [place] ([address])              [address], [address] [address], [facility] [address] [address] [id number]           [facility])     The patient has been notified to complete fasting labs prior to their physical appointment.

## 2023-10-09 ENCOUNTER — OFFICE VISIT (OUTPATIENT)
Dept: FAMILY MEDICINE CLINIC | Facility: CLINIC | Age: 88
End: 2023-10-09
Payer: MEDICARE

## 2023-10-09 VITALS
SYSTOLIC BLOOD PRESSURE: 120 MMHG | OXYGEN SATURATION: 97 % | DIASTOLIC BLOOD PRESSURE: 60 MMHG | RESPIRATION RATE: 16 BRPM | HEIGHT: 60 IN | HEART RATE: 70 BPM | WEIGHT: 150 LBS | BODY MASS INDEX: 29.45 KG/M2

## 2023-10-09 DIAGNOSIS — Z00.00 ENCOUNTER FOR ANNUAL HEALTH EXAMINATION: Primary | ICD-10-CM

## 2023-10-09 DIAGNOSIS — E78.2 MIXED HYPERLIPIDEMIA: ICD-10-CM

## 2023-10-09 DIAGNOSIS — G89.29 CHRONIC PAIN OF RIGHT KNEE: ICD-10-CM

## 2023-10-09 DIAGNOSIS — G30.9 ALZHEIMER'S DISEASE, UNSPECIFIED (HCC): ICD-10-CM

## 2023-10-09 DIAGNOSIS — Z23 NEEDS FLU SHOT: ICD-10-CM

## 2023-10-09 DIAGNOSIS — I25.10 CORONARY ARTERY DISEASE INVOLVING NATIVE HEART WITHOUT ANGINA PECTORIS, UNSPECIFIED VESSEL OR LESION TYPE: ICD-10-CM

## 2023-10-09 DIAGNOSIS — I10 PRIMARY HYPERTENSION: ICD-10-CM

## 2023-10-09 DIAGNOSIS — F02.80 ALZHEIMER'S DISEASE, UNSPECIFIED (HCC): ICD-10-CM

## 2023-10-09 DIAGNOSIS — E03.9 ACQUIRED HYPOTHYROIDISM: ICD-10-CM

## 2023-10-09 DIAGNOSIS — M25.561 CHRONIC PAIN OF RIGHT KNEE: ICD-10-CM

## 2023-11-02 DIAGNOSIS — E78.2 MIXED HYPERLIPIDEMIA: ICD-10-CM

## 2023-11-03 RX ORDER — EZETIMIBE 10 MG/1
10 TABLET ORAL DAILY
Qty: 90 TABLET | Refills: 3 | Status: SHIPPED | OUTPATIENT
Start: 2023-11-03

## 2023-11-03 NOTE — TELEPHONE ENCOUNTER
Requested Prescriptions     Pending Prescriptions Disp Refills    EZETIMIBE 10 MG Oral Tab [Pharmacy Med Name: EZETIMIBE 10MG TABLETS] 90 tablet 3     Sig: TAKE 1 TABLET(10 MG) BY MOUTH DAILY    metoprolol tartrate 25 MG Oral Tab [Pharmacy Med Name: METOPROLOL TARTRATE 25MG TABLETS] 90 tablet 3     Sig: TAKE 1 TABLET BY MOUTH EVERY DAY     LOV 10/9/2023     Patient was asked to follow-up in: Follow-up not documented in note    Appointment scheduled: 10/7/2024 Ciaran Branch MD     Medication refilled per protocol.

## 2023-12-20 ENCOUNTER — OFFICE VISIT (OUTPATIENT)
Dept: SURGERY | Facility: CLINIC | Age: 88
End: 2023-12-20

## 2023-12-20 DIAGNOSIS — N39.41 URGE INCONTINENCE: ICD-10-CM

## 2023-12-20 DIAGNOSIS — N32.81 OAB (OVERACTIVE BLADDER): Primary | ICD-10-CM

## 2023-12-20 PROCEDURE — 99214 OFFICE O/P EST MOD 30 MIN: CPT | Performed by: PHYSICIAN ASSISTANT

## 2023-12-20 RX ORDER — TROSPIUM CHLORIDE ER 60 MG/1
60 CAPSULE ORAL DAILY
Qty: 90 CAPSULE | Refills: 3 | Status: SHIPPED | OUTPATIENT
Start: 2023-12-20

## 2023-12-20 RX ORDER — DOCUSATE SODIUM 100 MG/1
100 CAPSULE, LIQUID FILLED ORAL 2 TIMES DAILY PRN
Qty: 60 CAPSULE | Refills: 3 | Status: SHIPPED | OUTPATIENT
Start: 2023-12-20

## 2023-12-20 NOTE — PATIENT INSTRUCTIONS
Continue myrbetriq 50mg daily. Add trospium XR 60mg daily. Start stool softener 1 daily, may increase to two daily if needed to regular bowels. Follow up 3 months.

## 2023-12-28 ENCOUNTER — TELEPHONE (OUTPATIENT)
Dept: SURGERY | Facility: CLINIC | Age: 88
End: 2023-12-28

## 2023-12-28 RX ORDER — FESOTERODINE FUMARATE 8 MG/1
8 TABLET, EXTENDED RELEASE ORAL DAILY
Qty: 90 TABLET | Refills: 0 | Status: SHIPPED | OUTPATIENT
Start: 2023-12-28 | End: 2024-03-27

## 2023-12-28 NOTE — TELEPHONE ENCOUNTER
Trospium not covered. On Myrbetriq.  LOV 12/20/23  Preferred alternative: Belia Stanford, Tolterodine Tartrate ER, Fesoterodine   RN forwarded to PA

## 2023-12-29 ENCOUNTER — OFFICE VISIT (OUTPATIENT)
Dept: PODIATRY CLINIC | Facility: CLINIC | Age: 88
End: 2023-12-29

## 2023-12-29 DIAGNOSIS — R73.01 ELEVATED FASTING GLUCOSE: ICD-10-CM

## 2023-12-29 DIAGNOSIS — M79.674 TOE PAIN, BILATERAL: ICD-10-CM

## 2023-12-29 DIAGNOSIS — B35.1 ONYCHOMYCOSIS: Primary | ICD-10-CM

## 2023-12-29 DIAGNOSIS — M20.42 HAMMER TOES OF BOTH FEET: ICD-10-CM

## 2023-12-29 DIAGNOSIS — M20.41 HAMMER TOES OF BOTH FEET: ICD-10-CM

## 2023-12-29 DIAGNOSIS — M79.675 TOE PAIN, BILATERAL: ICD-10-CM

## 2023-12-29 PROCEDURE — 99203 OFFICE O/P NEW LOW 30 MIN: CPT | Performed by: STUDENT IN AN ORGANIZED HEALTH CARE EDUCATION/TRAINING PROGRAM

## 2023-12-29 PROCEDURE — 11721 DEBRIDE NAIL 6 OR MORE: CPT | Performed by: STUDENT IN AN ORGANIZED HEALTH CARE EDUCATION/TRAINING PROGRAM

## 2023-12-29 PROCEDURE — 1126F AMNT PAIN NOTED NONE PRSNT: CPT | Performed by: STUDENT IN AN ORGANIZED HEALTH CARE EDUCATION/TRAINING PROGRAM

## 2023-12-29 NOTE — PROGRESS NOTES
[id number] [facility] Podiatry  Progress Note    Jarvis Jacobs is a 81year old female. Chief Complaint   Patient presents with    Toenail Care     Nail care and foot check- ingrown toenails-          HPI:     Patient is a pleasant 81-year-old female presents to clinic for foot exam.  She has elongated thickened nails she has difficulty trimming on her own. They are incurvated and painful especially the right foot. She also has painful hammertoe deformities. She denies any open sores or other concerns. She has difficulty reaching her nails to trim them on her own. Past medical history, medications, allergies reviewed. Allergies: Patient has no known allergies. Current Outpatient Medications   Medication Sig Dispense Refill    Fesoterodine Fumarate ER (TOVIAZ) 8 MG Oral Tablet 24 Hr Take 1 tablet (8 mg total) by mouth daily. 90 tablet 0    docusate sodium 100 MG Oral Cap Take 1 capsule (100 mg total) by mouth 2 (two) times daily as needed for constipation. 60 capsule 3    EZETIMIBE 10 MG Oral Tab TAKE 1 TABLET(10 MG) BY MOUTH DAILY 90 tablet 3    metoprolol tartrate 25 MG Oral Tab TAKE 1 TABLET BY MOUTH EVERY DAY 90 tablet 3    LEVOTHYROXINE 88 MCG Oral Tab TAKE 1 TABLET(88 MCG) BY MOUTH BEFORE BREAKFAST 90 tablet 1    Mirabegron ER (MYRBETRIQ) 50 MG Oral Tablet 24 Hr Take 1 tablet (50 mg total) by mouth daily. 90 tablet 3    donepezil 10 MG Oral Tab Take 1 tablet (10 mg total) by mouth nightly. 90 tablet 3    pantoprazole 40 MG Oral Tab EC Take 1 tablet (40 mg total) by mouth in the morning. 90 tablet 3    rosuvastatin 40 MG Oral Tab Take 1 tablet (40 mg total) by mouth daily. 90 tablet 3    Multiple Vitamin (ONE-DAILY MULTI VITAMINS) Oral Tab Take 1 tablet by mouth 3 (three) times a week. aspirin 81 MG Oral Tab Take 1 tablet (81 mg total) by mouth daily.         Past Medical History:   Diagnosis Date    Atherosclerosis of coronary artery     Bursitis of shoulder, right     Chest pain     Coronary atherosclerosis  2012    Disorder of thyroid     Esophageal reflux 2012    Essential hypertension     Hyperlipidemia 2012    Hypertension, benign     Hypothyroidism 2012    S/P CABG x 5     Type II  diabetes mellitus  2012    Unspecified vitamin D deficiency 2012      Past Surgical History:   Procedure Laterality Date    ANGIOPLASTY (CORONARY)  02    RCA graft stent    ANGIOPLASTY (CORONARY)  04    stent RCA graft, IABP    CABG  1995    x5    COLONOSCOPY  2004    HYSTERECTOMY  1985    partial hystero. LEG/ANKLE SURGERY PROC UNLISTED      angioplasty of left leg    LEG/ANKLE SURGERY PROC UNLISTED  2002    angioplasty of right leg    SURGICAL STENT      Coronary Artery Stent    TONSILLECTOMY  194    TOTAL ABDOM HYSTERECTOMY  1985      Family History   Problem Relation Age of Onset    Heart Disease Brother         Triple CABG    Heart Disease Mother       Social History     Socioeconomic History    Marital status:      Number of children: 2   Occupational History    Occupation: retired   Tobacco Use    Smoking status: Former     Types: Cigarettes     Quit date: 1995     Years since quittin.0    Smokeless tobacco: Never   Vaping Use    Vaping Use: Never used   Substance and Sexual Activity    Alcohol use: Not Currently     Alcohol/week: 0.0 - 1.0 standard drinks of alcohol     Comment: 1 per month    Drug use: No   Other Topics Concern    Caffeine Concern Yes     Comment: 1-2 cups of coffee daily    Exercise No     Comment: active    Seat Belt Yes    Self-Exams No           REVIEW OF SYSTEMS:     Today reviewed systems as documented below  GENERAL HEALTH: feels well otherwise  SKIN: denies any unusual skin lesions or rashes  RESPIRATORY: denies shortness of breath with exertion  CARDIOVASCULAR: denies chest pain on exertion  GI: denies abdominal pain and denies heartburn  NEURO: denies headaches  MUSCULO: uses walker for ambulation      EXAM:   There were no vitals taken for this visit. GENERAL: well developed, well nourished, in no apparent distress  EXTREMITIES:   1. Integument: Normal skin temperature and turgor. Nails x 10 are elongated, thickened, dystrophic, and with subungual debris. Nails are also incurvated. 2. Vascular: Pedal pulses are palpable. Lower extremity edema noted. 3. Musculoskeletal: All muscle groups are graded 5 out of 5 in the foot and ankle. Flexion contracture of lesser digits. Pain noted ingrown nails. 4. Neurological: Normal sharp dull sensation; reflexes normal.          ASSESSMENT AND PLAN:   Diagnoses and all orders for this visit:    Onychomycosis    Hammer toes of both feet    Toe pain, bilateral    Elevated fasting glucose        Plan:    -Patient examined, chart history reviewed. -Discussed importance of proper pedal hygiene, regular foot checks.  -Sharply debrided nails x10 with a sterile nail nipper achieving a 20% reduction in thickness and length, without incident. Nails further smoothed with dremel. Slant back performed areas of ingrown nails. Pinpoint bleeding noted to right third toe that is dressed with bacitracin and Band-Aid. Patient to monitor for tingling.  -Ambulate with supportive shoes and inserts and avoid walking barefoot.  -Educated patient on acute signs of infection advised patient to seek immediate medical attention if symptoms arise. The patient indicates understanding of these issues and agrees to the plan. RTC 2 to 3 months. JOEY Spencer speech recognition software was used to prepare this note. Errors in word recognition may occur. Please contact me with any questions/concerns with this note.

## 2024-02-04 DIAGNOSIS — K21.9 GASTROESOPHAGEAL REFLUX DISEASE: ICD-10-CM

## 2024-02-04 DIAGNOSIS — E78.2 MIXED HYPERLIPIDEMIA: ICD-10-CM

## 2024-02-04 DIAGNOSIS — E03.9 ACQUIRED HYPOTHYROIDISM: ICD-10-CM

## 2024-02-07 ENCOUNTER — OFFICE VISIT (OUTPATIENT)
Dept: SURGERY | Facility: CLINIC | Age: 89
End: 2024-02-07

## 2024-02-07 DIAGNOSIS — K59.00 CONSTIPATION, UNSPECIFIED CONSTIPATION TYPE: ICD-10-CM

## 2024-02-07 DIAGNOSIS — N32.81 OAB (OVERACTIVE BLADDER): Primary | ICD-10-CM

## 2024-02-07 DIAGNOSIS — R32 URINARY INCONTINENCE, UNSPECIFIED TYPE: ICD-10-CM

## 2024-02-07 PROCEDURE — 99213 OFFICE O/P EST LOW 20 MIN: CPT | Performed by: PHYSICIAN ASSISTANT

## 2024-02-07 RX ORDER — ROSUVASTATIN CALCIUM 40 MG/1
40 TABLET, COATED ORAL DAILY
Qty: 90 TABLET | Refills: 3 | Status: SHIPPED | OUTPATIENT
Start: 2024-02-07

## 2024-02-07 RX ORDER — LEVOTHYROXINE SODIUM 88 UG/1
88 TABLET ORAL
Qty: 90 TABLET | Refills: 1 | Status: SHIPPED | OUTPATIENT
Start: 2024-02-07

## 2024-02-07 NOTE — PROGRESS NOTES
Subjective:   Abbi Perez is a 88 year old female with a PMH of HTN, HL, DM, hypothyroid, CAD s/p CABG x 5 and stent, GERD, OAB with UUI who presents today for follow up urinary incontinence.     Saw patient last in December 2023. She had been taking Myrbetriq 50mg without significant improvement. She elected to try dual therapy with Myrbetriq and Trospium daily. I also encouraged ongoing bowel regimen.     The trospium wasn't covered so we had to switch to Toviaz. She is unsure if she is still taking this or truly what she is taking, but she says that she has noticed improvement. No more \"pouring\" out of her. She notes that she wakes at 5am to void, voids again in the morning, and then won't void in the evening. She stopped doing timed voids because the urge isn't present like it was previously.     Bowel movements haven't changed. Only taking stool softener once daily.     Gross hematuria: none  Tobacco hx: 40 pack years, quit 1995  Kidney stone hx: none  Fam h/o  malignancy: none     Microhematuria - CT scan with tiny stone <1mm, no intervention. Other non  findings reviewed and advised non urgent but could discuss with PCP further if desired.     History/Other:    Chief Complaint Reviewed and Verified  Nursing Notes Reviewed and   Verified  Allergies Reviewed  Medications Reviewed         Current Outpatient Medications   Medication Sig Dispense Refill    Fesoterodine Fumarate ER (TOVIAZ) 8 MG Oral Tablet 24 Hr Take 1 tablet (8 mg total) by mouth daily. 90 tablet 0    docusate sodium 100 MG Oral Cap Take 1 capsule (100 mg total) by mouth 2 (two) times daily as needed for constipation. 60 capsule 3    EZETIMIBE 10 MG Oral Tab TAKE 1 TABLET(10 MG) BY MOUTH DAILY 90 tablet 3    metoprolol tartrate 25 MG Oral Tab TAKE 1 TABLET BY MOUTH EVERY DAY 90 tablet 3    LEVOTHYROXINE 88 MCG Oral Tab TAKE 1 TABLET(88 MCG) BY MOUTH BEFORE BREAKFAST 90 tablet 1    Mirabegron ER (MYRBETRIQ) 50 MG Oral Tablet 24 Hr Take  1 tablet (50 mg total) by mouth daily. 90 tablet 3    donepezil 10 MG Oral Tab Take 1 tablet (10 mg total) by mouth nightly. 90 tablet 3    pantoprazole 40 MG Oral Tab EC Take 1 tablet (40 mg total) by mouth in the morning. 90 tablet 3    rosuvastatin 40 MG Oral Tab Take 1 tablet (40 mg total) by mouth daily. 90 tablet 3    Multiple Vitamin (ONE-DAILY MULTI VITAMINS) Oral Tab Take 1 tablet by mouth 3 (three) times a week.      aspirin 81 MG Oral Tab Take 1 tablet (81 mg total) by mouth daily.         Review of Systems:  Pertinent items are noted in HPI.      Objective:   There were no vitals taken for this visit. Estimated body mass index is 29.29 kg/m² as calculated from the following:    Height as of 10/9/23: 5' (1.524 m).    Weight as of 10/9/23: 150 lb (68 kg).    No distress, forgetful  Non labored respirations    Laboratory Data:  Lab Results   Component Value Date    WBC 4.7 08/21/2021    HGB 13.9 08/21/2021    .0 08/21/2021     Lab Results   Component Value Date     02/24/2023    K 4.3 02/24/2023     02/24/2023    CO2 27.0 02/24/2023    BUN 18 02/24/2023     (H) 02/24/2023    GFRAA 36 (L) 10/29/2021    AST 35 02/24/2023    ALT 67 (H) 02/24/2023    TP 7.0 02/24/2023    ALB 3.6 02/24/2023    CA 9.9 02/24/2023       Urinalysis Results (last three years):  Recent Labs     08/03/21  1918 08/03/21  2110 08/21/21  1802 10/29/21  0730 05/04/23  1047   COLORUR  --  Straw Straw Straw  --    CLARITY  --  Clear Clear Clear  --    SPECGRAVITY 1.025 1.005 1.005 1.015 1.025   PHURINE  --  6.0 6.0 6.0 6.5   PROUR  --  Negative Negative Negative  --    GLUUR Negative Negative Negative Negative  --    KETUR  --  Negative Negative Negative  --    BILUR  --  Negative Negative Negative  --    BLOODURINE  --  Moderate* Small* Negative  --    NITRITE  --  Negative Negative Negative Negative   UROBILINOGEN  --  <2.0 <2.0 <2.0  --    LEUUR  --  Large* Negative Large*  --    WBCUR  --  21-50* 1-5 21-50*  --     RBCUR  --  0-2 None Seen None Seen  --    BACUR  --  1+* None Seen None Seen  --        Urine Culture Results (last three years):  Lab Results   Component Value Date    URINECUL No Growth 2 Days 05/04/2023    URINECUL No Growth at 18-24 hrs. 10/29/2021    URINECUL No Growth at 18-24 hrs. 08/03/2021    URINECUL >100,000 CFU/ML Escherichia coli (A) 10/24/2019    URINECUL >100,000 CFU/ML Gram positive javi 04/09/2015       Imaging  No results found.    Assessment & Plan:   OAB with UUI  Constipation    Reviewed again with patient potential contributing factors  Wrote down to continue both toviaz and myrbetriq daily for her bladder symptoms  Wrote down to continue colace twice daily.     Follow up in 6 months.     The above impression and plan were discussed in detail with the patient who verbalized understanding and all questions were answered.  A total of 20 minutes were spent on the visit including chart review in preparation for the visit, time with the patient, placing orders and communication with other providers where appropriate.      Dianne Wilson PA-C, 2/7/2024

## 2024-02-07 NOTE — PATIENT INSTRUCTIONS
These two medications are for your bladder, continue:  Fesoterodine Fumarate ER (TOVIAZ) 8 MG Oral Tablet 24 Hr  - > continue once daily.  Mirabegron ER (MYRBETRIQ) 50 MG Oral Tablet 24 H  -> continue once daily      You should take docusate sodium 100 MG Oral Cap twice daily - > this is for your bowels.       Follow up in 6 months.

## 2024-02-07 NOTE — TELEPHONE ENCOUNTER
Notified Allegra Chiquito that a medication has been ordered to the pharmacy.   Abbi verbalized understanding Zoya notified and verbalized understanding   She will call office back where to send lab slip

## 2024-02-07 NOTE — TELEPHONE ENCOUNTER
Refill request for:    Requested Prescriptions     Pending Prescriptions Disp Refills    PANTOPRAZOLE 40 MG Oral Tab EC [Pharmacy Med Name: PANTOPRAZOLE 40MG TABLETS] 90 tablet 3     Sig: TAKE 1 TABLET(40 MG) BY MOUTH IN THE MORNING     Signed Prescriptions Disp Refills    LEVOTHYROXINE 88 MCG Oral Tab 90 tablet 1     Sig: TAKE 1 TABLET BY MOUTH DAILY BEFORE BREAKFAST     Authorizing Provider: BRIANNA GONZALEZ     Ordering User: JOSE LUIS MIRZA    ROSUVASTATIN 40 MG Oral Tab 90 tablet 3     Sig: TAKE 1 TABLET(40 MG) BY MOUTH DAILY     Authorizing Provider: BRIANNA GONZALEZ     Ordering User: JOSE LUIS MIRZA        Last Prescribed Quantity Refills   11/4/22 90 3     LOV 10/9/2023     Patient was asked to follow-up in: one year    Appointment scheduled: 10/7/2024 Brianna Gonzalez MD    Medication not on protocol.     # 90 with 3 refills routed to Brianna Gonzalez MD for review      Component  Ref Range & Units 2/24/23 10:34 AM   TSH  0.358 - 3.740 mIU/mL 4.870 High        Cholesterol Medication Protocol [id number]/07/2024 03:06 PM   Protocol Details ALT < 80    ALT resulted within past year    Lipid panel within past 12 months    In person appointment or virtual visit in the past 12 mos or appointment in next 3 mos

## 2024-02-08 RX ORDER — PANTOPRAZOLE SODIUM 40 MG/1
40 TABLET, DELAYED RELEASE ORAL EVERY MORNING
Qty: 90 TABLET | Refills: 3 | Status: SHIPPED | OUTPATIENT
Start: 2024-02-08

## 2024-02-27 ENCOUNTER — OFFICE VISIT (OUTPATIENT)
Dept: FAMILY MEDICINE CLINIC | Facility: CLINIC | Age: 89
End: 2024-02-27
Payer: MEDICARE

## 2024-02-27 VITALS
DIASTOLIC BLOOD PRESSURE: 62 MMHG | OXYGEN SATURATION: 94 % | HEART RATE: 68 BPM | WEIGHT: 152.13 LBS | BODY MASS INDEX: 29.86 KG/M2 | RESPIRATION RATE: 16 BRPM | HEIGHT: 60 IN | SYSTOLIC BLOOD PRESSURE: 140 MMHG

## 2024-02-27 DIAGNOSIS — M17.11 ARTHRITIS OF RIGHT KNEE: Primary | ICD-10-CM

## 2024-02-27 DIAGNOSIS — K59.00 CONSTIPATION, UNSPECIFIED CONSTIPATION TYPE: ICD-10-CM

## 2024-02-27 PROCEDURE — 99213 OFFICE O/P EST LOW 20 MIN: CPT | Performed by: FAMILY MEDICINE

## 2024-02-27 RX ORDER — MELOXICAM 15 MG/1
15 TABLET ORAL DAILY PRN
Qty: 30 TABLET | Refills: 0 | Status: SHIPPED | OUTPATIENT
Start: 2024-02-27

## 2024-02-27 NOTE — PROGRESS NOTES
Chief Complaint   Patient presents with    Leg Pain     Patient here leg pain      HPI:   Abbi Perez is a 88 year old female who presents to the office for eval of leg pain.  Still having significant pain in the R knee.  Now having constant pain.    Pain increases when she is inactive.  Gets worse as the day goes on.    Has completes therapy, uses a brace.  This helps some.    Topical creams provide some relief, but not enough.    Does not keep here awake at night    Has had injections and other conservative interventions with only minimal relief.     Dr. Villanueva has offered the gel shot in the past and she is interested in pursuing this.  A friend of hers had this a year ago and got great success.      She has adjusted her gait to minimize the pain - now shuffles laterally.        GI - tends to be constipated.  Having a hard time passing the bowels, and needs to use her finger to relieve the stool burden.  The stool is coming out as hard rocks.  Currently on docusate.    Has used miralax in the past, but stopped when she was put on docusate.    REVIEW OF SYSTEMS:   Pertinent items are noted in HPI.  EXAM:   /62   Pulse 68   Resp 16   Ht 5' (1.524 m)   Wt 152 lb 2 oz (69 kg)   SpO2 94%   BMI 29.71 kg/m²     General appearance - alert, well appearing, and in no distress.  Using 2 wheel walker.  Walks with hips shifted toward the L side shuffling forward.    Musculoskeletal - no joint tenderness, deformity or swelling.  R knee in brace  Extremities - peripheral pulses normal, no pedal edema, no clubbing or cyanosis  ASSESSMENT AND PLAN:     Abbi Perez was seen in the office today:  had concerns including Leg Pain (Patient here leg pain).    1. Arthritis of right knee  Will do trial of PRN meloxicam  Need to see ortho for next step in therapy.  Interested in gel injection  - Ortho Referral - In Network  - Meloxicam 15 MG Oral Tab; Take 1 tablet (15 mg total) by mouth daily as needed for Pain.   Dispense: 30 tablet; Refill: 0    2. Constipation, unspecified constipation type  Stop docusate - ineffective  Go back to miralax as this was effective.    Start daily  If having diarrhea, may need to go to QOD      Michael Gonzalez M.D.   [facility] 3  02/27/24

## 2024-02-28 ENCOUNTER — OFFICE VISIT (OUTPATIENT)
Dept: PODIATRY CLINIC | Facility: CLINIC | Age: 89
End: 2024-02-28

## 2024-02-28 DIAGNOSIS — G30.9 ALZHEIMER'S DISEASE, UNSPECIFIED (HCC): ICD-10-CM

## 2024-02-28 DIAGNOSIS — B35.1 ONYCHOMYCOSIS: Primary | ICD-10-CM

## 2024-02-28 DIAGNOSIS — R73.01 ELEVATED FASTING GLUCOSE: ICD-10-CM

## 2024-02-28 DIAGNOSIS — M20.42 HAMMER TOES OF BOTH FEET: ICD-10-CM

## 2024-02-28 DIAGNOSIS — F02.80 ALZHEIMER'S DISEASE, UNSPECIFIED (HCC): ICD-10-CM

## 2024-02-28 DIAGNOSIS — M20.41 HAMMER TOES OF BOTH FEET: ICD-10-CM

## 2024-02-28 DIAGNOSIS — M79.675 TOE PAIN, BILATERAL: ICD-10-CM

## 2024-02-28 DIAGNOSIS — M79.674 TOE PAIN, BILATERAL: ICD-10-CM

## 2024-02-28 PROCEDURE — 99213 OFFICE O/P EST LOW 20 MIN: CPT | Performed by: STUDENT IN AN ORGANIZED HEALTH CARE EDUCATION/TRAINING PROGRAM

## 2024-02-28 NOTE — PROGRESS NOTES
[facility] Podiatry  Progress Note    Abbi Perez is a 88 year old female.   No chief complaint on file.        HPI:     Patient is a pleasant 88-year-old female presents to clinic for foot exam.  She has elongated thickened nails she has difficulty trimming on her own.  They are incurvated and painful especially the right foot.  She also has painful hammertoe deformities.  She denies any open sores or other concerns.  She has difficulty reaching her nails to trim them on her own.  Past medical history, medications, allergies reviewed.      Allergies: Patient has no known allergies.   Current Outpatient Medications   Medication Sig Dispense Refill    Meloxicam 15 MG Oral Tab Take 1 tablet (15 mg total) by mouth daily as needed for Pain. 30 tablet 0    pantoprazole 40 MG Oral Tab EC Take 1 tablet (40 mg total) by mouth every morning. 90 tablet 3    LEVOTHYROXINE 88 MCG Oral Tab TAKE 1 TABLET BY MOUTH DAILY BEFORE BREAKFAST 90 tablet 1    ROSUVASTATIN 40 MG Oral Tab TAKE 1 TABLET(40 MG) BY MOUTH DAILY 90 tablet 3    Fesoterodine Fumarate ER (TOVIAZ) 8 MG Oral Tablet 24 Hr Take 1 tablet (8 mg total) by mouth daily. 90 tablet 0    docusate sodium 100 MG Oral Cap Take 1 capsule (100 mg total) by mouth 2 (two) times daily as needed for constipation. 60 capsule 3    EZETIMIBE 10 MG Oral Tab TAKE 1 TABLET(10 MG) BY MOUTH DAILY 90 tablet 3    metoprolol tartrate 25 MG Oral Tab TAKE 1 TABLET BY MOUTH EVERY DAY 90 tablet 3    Mirabegron ER (MYRBETRIQ) 50 MG Oral Tablet 24 Hr Take 1 tablet (50 mg total) by mouth daily. 90 tablet 3    donepezil 10 MG Oral Tab Take 1 tablet (10 mg total) by mouth nightly. 90 tablet 3    Multiple Vitamin (ONE-DAILY MULTI VITAMINS) Oral Tab Take 1 tablet by mouth 3 (three) times a week.      aspirin 81 MG Oral Tab Take 1 tablet (81 mg total) by mouth daily.        Past Medical History:   Diagnosis Date    Atherosclerosis of coronary artery     Bursitis of shoulder, right     Chest pain      Coronary atherosclerosis  2012    Disorder of thyroid     Esophageal reflux 2012    Essential hypertension     Hyperlipidemia 2012    Hypertension, benign     Hypothyroidism 2012    S/P CABG x 5     Type II  diabetes mellitus  2012    Unspecified vitamin D deficiency 2012      Past Surgical History:   Procedure Laterality Date    ANGIOPLASTY (CORONARY)  02    RCA graft stent    ANGIOPLASTY (CORONARY)  04    stent RCA graft, IABP    CABG  1995    x5    COLONOSCOPY  2004    HYSTERECTOMY  1985    partial hystero.    LEG/ANKLE SURGERY PROC UNLISTED      angioplasty of left leg    LEG/ANKLE SURGERY PROC UNLISTED  2002    angioplasty of right leg    SURGICAL STENT      Coronary Artery Stent    TONSILLECTOMY  194    TOTAL ABDOM HYSTERECTOMY  1985      Family History   Problem Relation Age of Onset    Heart Disease Brother         Triple CABG    Heart Disease Mother       Social History     Socioeconomic History    Marital status:     Number of children: 2   Occupational History    Occupation: retired   Tobacco Use    Smoking status: Former     Types: Cigarettes     Quit date: 1995     Years since quittin.1    Smokeless tobacco: Never   Vaping Use    Vaping Use: Never used   Substance and Sexual Activity    Alcohol use: Not Currently     Alcohol/week: 0.0 - 1.0 standard drinks of alcohol     Comment: 1 per month    Drug use: No   Other Topics Concern    Caffeine Concern Yes     Comment: 1-2 cups of coffee daily    Exercise No     Comment: active    Seat Belt Yes    Self-Exams No           REVIEW OF SYSTEMS:     Today reviewed systems as documented below  GENERAL HEALTH: feels well otherwise  SKIN: denies any unusual skin lesions or rashes  RESPIRATORY: denies shortness of breath with exertion  CARDIOVASCULAR: denies chest pain on exertion  GI: denies abdominal pain and denies heartburn  NEURO: denies headaches  MUSCULO: uses walker for ambulation      EXAM:    There were no vitals taken for this visit.  GENERAL: well developed, well nourished, in no apparent distress  EXTREMITIES:   1. Integument: Normal skin temperature and turgor.  Nails x 10 are elongated, thickened, dystrophic, and with subungual debris.  Nails are also incurvated.  2. Vascular: Pedal pulses are palpable.  Lower extremity edema noted.   3. Musculoskeletal: All muscle groups are graded 5 out of 5 in the foot and ankle.  Flexion contracture of lesser digits.  Pain noted ingrown nails.   4. Neurological: Normal sharp dull sensation; reflexes normal.          ASSESSMENT AND PLAN:   Diagnoses and all orders for this visit:    Onychomycosis    Hammer toes of both feet    Toe pain, bilateral    Elevated fasting glucose    Alzheimer's disease, unspecified (HCC)        Plan:    -Patient examined, chart history reviewed.  -Discussed importance of proper pedal hygiene, regular foot checks.  -Sharply debrided nails x10 with a sterile nail nipper achieving a 20% reduction in thickness and length, without incident. Nails further smoothed with dremel.  Slant back performed areas of ingrown nails.  -Ambulate with supportive shoes and inserts and avoid walking barefoot.  -Educated patient on acute signs of infection advised patient to seek immediate medical attention if symptoms arise.     The patient indicates understanding of these issues and agrees to the plan.    RTC 2 to 3 months.    Desmond Solano DPM    Dragon speech recognition software was used to prepare this note.  Errors in word recognition may occur.  Please contact me with any questions/concerns with this note.

## 2024-03-25 RX ORDER — FESOTERODINE FUMARATE 8 MG/1
8 TABLET, FILM COATED, EXTENDED RELEASE ORAL DAILY
Qty: 90 TABLET | Refills: 0 | Status: SHIPPED | OUTPATIENT
Start: 2024-03-25 | End: 2024-06-23

## 2024-03-29 RX ORDER — FESOTERODINE FUMARATE 8 MG/1
8 TABLET, EXTENDED RELEASE ORAL DAILY
Qty: 90 TABLET | Refills: 0 | OUTPATIENT
Start: 2024-03-29

## 2024-04-20 DIAGNOSIS — G30.9 MODERATE ALZHEIMER'S DEMENTIA WITHOUT BEHAVIORAL DISTURBANCE, PSYCHOTIC DISTURBANCE, MOOD DISTURBANCE, OR ANXIETY, UNSPECIFIED TIMING OF DEMENTIA ONSET (HCC): ICD-10-CM

## 2024-04-20 DIAGNOSIS — F02.B0 MODERATE ALZHEIMER'S DEMENTIA WITHOUT BEHAVIORAL DISTURBANCE, PSYCHOTIC DISTURBANCE, MOOD DISTURBANCE, OR ANXIETY, UNSPECIFIED TIMING OF DEMENTIA ONSET (HCC): ICD-10-CM

## 2024-04-23 RX ORDER — MIRABEGRON 50 MG/1
50 TABLET, FILM COATED, EXTENDED RELEASE ORAL DAILY
Qty: 90 TABLET | Refills: 3 | Status: SHIPPED | OUTPATIENT
Start: 2024-04-23

## 2024-04-26 NOTE — TELEPHONE ENCOUNTER
Refill request for:    Requested Prescriptions     Pending Prescriptions Disp Refills    DONEPEZIL 10 MG Oral Tab [Pharmacy Med Name: DONEPEZIL 10MG TABLETS] 90 tablet 3     Sig: TAKE 1 TABLET(10 MG) BY MOUTH EVERY NIGHT        Last Prescribed Quantity Refills   2/24/23 90 3     LOV 2/27/2024     Patient was asked to follow-up in: Follow-up not documented in note    Appointment scheduled: 10/7/2024 Michael Gonzalez MD    Medication not on protocol.     # 90 with 3 refills routed to Michael Gonzalez MD for review

## 2024-04-27 RX ORDER — DONEPEZIL HYDROCHLORIDE 10 MG/1
10 TABLET, FILM COATED ORAL NIGHTLY
Qty: 90 TABLET | Refills: 3 | Status: SHIPPED | OUTPATIENT
Start: 2024-04-27

## 2024-05-01 ENCOUNTER — OFFICE VISIT (OUTPATIENT)
Dept: PODIATRY CLINIC | Facility: CLINIC | Age: 89
End: 2024-05-01

## 2024-05-01 DIAGNOSIS — F02.80 ALZHEIMER'S DISEASE, UNSPECIFIED (HCC): ICD-10-CM

## 2024-05-01 DIAGNOSIS — M20.42 HAMMER TOES OF BOTH FEET: ICD-10-CM

## 2024-05-01 DIAGNOSIS — B35.1 ONYCHOMYCOSIS: Primary | ICD-10-CM

## 2024-05-01 DIAGNOSIS — R73.01 ELEVATED FASTING GLUCOSE: ICD-10-CM

## 2024-05-01 DIAGNOSIS — M79.674 TOE PAIN, BILATERAL: ICD-10-CM

## 2024-05-01 DIAGNOSIS — G30.9 ALZHEIMER'S DISEASE, UNSPECIFIED (HCC): ICD-10-CM

## 2024-05-01 DIAGNOSIS — M79.675 TOE PAIN, BILATERAL: ICD-10-CM

## 2024-05-01 DIAGNOSIS — M20.41 HAMMER TOES OF BOTH FEET: ICD-10-CM

## 2024-05-01 PROCEDURE — 99213 OFFICE O/P EST LOW 20 MIN: CPT | Performed by: STUDENT IN AN ORGANIZED HEALTH CARE EDUCATION/TRAINING PROGRAM

## 2024-05-01 NOTE — PROGRESS NOTES
[facility]  Progress Note    Abbi Perez is a 89 year old female.   Chief Complaint   Patient presents with    Toenail Care     Nail trim and foot check f/u- denies pain         HPI:     Patient is a pleasant 89-year-old female presents to clinic for foot exam.  She has elongated thickened nails she has difficulty trimming on her own.  They are incurvated and painful especially the right foot.  She also has painful hammertoe deformities.  She denies any open sores or other concerns.  She has difficulty reaching her nails to trim them on her own.  Past medical history, medications, allergies reviewed.      Allergies: Patient has no known allergies.   Current Outpatient Medications   Medication Sig Dispense Refill    donepezil 10 MG Oral Tab Take 1 tablet (10 mg total) by mouth nightly. 90 tablet 3    MYRBETRIQ 50 MG Oral Tablet 24 Hr TAKE 1 TABLET(50 MG) BY MOUTH DAILY 90 tablet 3    Fesoterodine Fumarate ER (TOVIAZ) 8 MG Oral Tablet 24 Hr Take 1 tablet (8 mg total) by mouth daily. 90 tablet 0    Meloxicam 15 MG Oral Tab Take 1 tablet (15 mg total) by mouth daily as needed for Pain. 30 tablet 0    pantoprazole 40 MG Oral Tab EC Take 1 tablet (40 mg total) by mouth every morning. 90 tablet 3    LEVOTHYROXINE 88 MCG Oral Tab TAKE 1 TABLET BY MOUTH DAILY BEFORE BREAKFAST 90 tablet 1    ROSUVASTATIN 40 MG Oral Tab TAKE 1 TABLET(40 MG) BY MOUTH DAILY 90 tablet 3    docusate sodium 100 MG Oral Cap Take 1 capsule (100 mg total) by mouth 2 (two) times daily as needed for constipation. 60 capsule 3    EZETIMIBE 10 MG Oral Tab TAKE 1 TABLET(10 MG) BY MOUTH DAILY 90 tablet 3    metoprolol tartrate 25 MG Oral Tab TAKE 1 TABLET BY MOUTH EVERY DAY 90 tablet 3    Multiple Vitamin (ONE-DAILY MULTI VITAMINS) Oral Tab Take 1 tablet by mouth 3 (three) times a week.      aspirin 81 MG Oral Tab Take 1 tablet (81 mg total) by mouth daily.        Past Medical History:    Atherosclerosis of coronary artery    Bursitis of  shoulder, right    Chest pain    Coronary atherosclerosis     Disorder of thyroid    Esophageal reflux    Essential hypertension    Hyperlipidemia    Hypertension, benign    Hypothyroidism    S/P CABG x 5    Type II  diabetes mellitus     Unspecified vitamin D deficiency      Past Surgical History:   Procedure Laterality Date    Angioplasty (coronary)  02    RCA graft stent    Angioplasty (coronary)  04    stent RCA graft, IABP    Cabg  1995    x5    Colonoscopy  2004    Hysterectomy  1985    partial hystero.    Leg/ankle surgery proc unlisted      angioplasty of left leg    Leg/ankle surgery proc unlisted  2002    angioplasty of right leg    Surgical stent      Coronary Artery Stent    Tonsillectomy  1941    Total abdom hysterectomy  1985      Family History   Problem Relation Age of Onset    Heart Disease Brother         Triple CABG    Heart Disease Mother       Social History     Socioeconomic History    Marital status:     Number of children: 2   Occupational History    Occupation: retired   Tobacco Use    Smoking status: Former     Current packs/day: 0.00     Types: Cigarettes     Quit date: 1995     Years since quittin.3    Smokeless tobacco: Never   Vaping Use    Vaping status: Never Used   Substance and Sexual Activity    Alcohol use: Not Currently     Alcohol/week: 0.0 - 1.0 standard drinks of alcohol     Comment: 1 per month    Drug use: No   Other Topics Concern    Caffeine Concern Yes     Comment: 1-2 cups of coffee daily    Exercise No     Comment: active    Seat Belt Yes    Self-Exams No           REVIEW OF SYSTEMS:     Today reviewed systems as documented below  GENERAL HEALTH: feels well otherwise  SKIN: denies any unusual skin lesions or rashes  RESPIRATORY: denies shortness of breath with exertion  CARDIOVASCULAR: denies chest pain on exertion  GI: denies abdominal pain and denies heartburn  NEURO: denies headaches  MUSCULO: uses walker for ambulation      EXAM:    There were no vitals taken for this visit.  GENERAL: well developed, well nourished, in no apparent distress  EXTREMITIES:   1. Integument: Normal skin temperature and turgor.  Nails x 10 are elongated, thickened, dystrophic, and with subungual debris.  Nails are also incurvated.  2. Vascular: Pedal pulses are palpable.  Lower extremity edema noted.   3. Musculoskeletal: All muscle groups are graded 5 out of 5 in the foot and ankle.  Flexion contracture of lesser digits.  Pain noted ingrown nails.   4. Neurological: Normal sharp dull sensation; reflexes normal.          ASSESSMENT AND PLAN:   Diagnoses and all orders for this visit:    Onychomycosis    Hammer toes of both feet    Toe pain, bilateral    Elevated fasting glucose    Alzheimer's disease, unspecified (HCC)          Plan:    -Patient examined, chart history reviewed.  -Discussed importance of proper pedal hygiene, regular foot checks.  -Sharply debrided nails x10 with a sterile nail nipper achieving a 20% reduction in thickness and length, without incident. Nails further smoothed with dremel.  Slant back performed areas of ingrown nails.  -Ambulate with supportive shoes and inserts and avoid walking barefoot.  -Educated patient on acute signs of infection advised patient to seek immediate medical attention if symptoms arise.     The patient indicates understanding of these issues and agrees to the plan.    RTC 2 to 3 months.    Desmond Solano DPM    Dragon speech recognition software was used to prepare this note.  Errors in word recognition may occur.  Please contact me with any questions/concerns with this note.

## 2024-05-15 RX ORDER — MIRABEGRON 50 MG/1
50 TABLET, FILM COATED, EXTENDED RELEASE ORAL DAILY
Qty: 90 TABLET | Refills: 3 | OUTPATIENT
Start: 2024-05-15

## 2024-05-17 ENCOUNTER — TELEPHONE (OUTPATIENT)
Dept: SURGERY | Facility: CLINIC | Age: 89
End: 2024-05-17

## 2024-05-17 NOTE — TELEPHONE ENCOUNTER
Patient son calling for refill for patient. Per son medication is completely out. Please advise.     MYRBETRIQ 50 MG Oral Tablet 24 Hr 90 tablet 3 4/23/2024 --    Sig - Route: TAKE 1 TABLET(50 MG) BY MOUTH DAILY - Oral    Sent to pharmacy as: Myrbetriq 50 MG Oral Tablet Extended Release 24 Hour (Mirabegron ER)    E-Prescribing Status: Receipt confirmed by pharmacy (4/23/2024 10:58 AM CDT)      Print Trail   Printed On Printed By Printed To Report   4/23/24 10:57 AM [place] Zafar, RN [facility] Generic Report

## 2024-05-20 RX ORDER — MIRABEGRON 50 MG/1
50 TABLET, FILM COATED, EXTENDED RELEASE ORAL DAILY
Qty: 90 TABLET | Refills: 3 | OUTPATIENT
Start: 2024-05-20

## 2024-05-20 RX ORDER — MIRABEGRON 50 MG/1
50 TABLET, FILM COATED, EXTENDED RELEASE ORAL DAILY
Qty: 90 TABLET | Refills: 3 | Status: SHIPPED | OUTPATIENT
Start: 2024-05-20

## 2024-07-11 RX ORDER — FESOTERODINE FUMARATE 8 MG/1
8 TABLET, FILM COATED, EXTENDED RELEASE ORAL DAILY
Qty: 90 TABLET | Refills: 0 | Status: SHIPPED | OUTPATIENT
Start: 2024-07-11

## 2024-07-21 DIAGNOSIS — E03.9 ACQUIRED HYPOTHYROIDISM: ICD-10-CM

## 2024-07-23 RX ORDER — LEVOTHYROXINE SODIUM 88 UG/1
88 TABLET ORAL
Qty: 90 TABLET | Refills: 0 | Status: SHIPPED | OUTPATIENT
Start: 2024-07-23

## 2024-07-23 NOTE — TELEPHONE ENCOUNTER
Requested Prescriptions     Pending Prescriptions Disp Refills    LEVOTHYROXINE 88 MCG Oral Tab [Pharmacy Med Name: LEVOTHYROXINE 0.088MG (88MCG) TAB] 90 tablet 1     Sig: TAKE 1 TABLET BY MOUTH DAILY BEFORE BREAKFAST     LOV 2/27/2024     Patient was asked to follow-up in: Follow-up not documented in note    Appointment scheduled: 10/7/2024 Michael Gonzalez MD     Medication refilled per protocol.

## 2024-08-07 ENCOUNTER — OFFICE VISIT (OUTPATIENT)
Dept: SURGERY | Facility: CLINIC | Age: 89
End: 2024-08-07
Payer: MEDICARE

## 2024-08-07 DIAGNOSIS — N32.81 OAB (OVERACTIVE BLADDER): Primary | ICD-10-CM

## 2024-08-07 DIAGNOSIS — R82.90 UNSPECIFIED ABNORMAL FINDINGS IN URINE: ICD-10-CM

## 2024-08-07 DIAGNOSIS — N39.41 URGE INCONTINENCE: ICD-10-CM

## 2024-08-07 LAB
APPEARANCE: CLEAR
BILIRUBIN: NEGATIVE
GLUCOSE (URINE DIPSTICK): NEGATIVE MG/DL
KETONES (URINE DIPSTICK): NEGATIVE MG/DL
LEUKOCYTES: NEGATIVE
MULTISTIX LOT#: NORMAL NUMERIC
NITRITE, URINE: NEGATIVE
OCCULT BLOOD: NEGATIVE
PH, URINE: 6.5 (ref 4.5–8)
SPECIFIC GRAVITY: 1.02 (ref 1–1.03)
URINE-COLOR: YELLOW
UROBILINOGEN,SEMI-QN: 0.2 MG/DL (ref 0–1.9)

## 2024-08-07 PROCEDURE — 99214 OFFICE O/P EST MOD 30 MIN: CPT | Performed by: PHYSICIAN ASSISTANT

## 2024-08-07 PROCEDURE — 51798 US URINE CAPACITY MEASURE: CPT | Performed by: PHYSICIAN ASSISTANT

## 2024-08-07 PROCEDURE — 81003 URINALYSIS AUTO W/O SCOPE: CPT | Performed by: PHYSICIAN ASSISTANT

## 2024-08-07 NOTE — PROGRESS NOTES
Subjective:   Abbi Perez is a 89 year old female with a PMH of HTN, HL, DM, hypothyroid, CAD s/p CABG x 5 and stent, GERD, OAB with UUI who presents today for follow up urinary incontinence.     Seen last in February 2024 for same. Gave instructions to continue toviaz and myrbetriq, colace twice daily.     Patient notes that she tends to go more often than note, \"always wet\". She is currently voiding q 30 minutes and nocturia x 0-1. Wears pads for UUI. States that her pants/depends are wet, but unsure on the number of times. Sometimes she feels she is dry for a \"long time\".     Son present to try to help with history. He believes she is only taking one of the medications. He notes that there is benefit to the medication and can see worsening if a dose is missed.     Drinks typically 1 cup of coffee daily. No soda.  Bowels every 3-4 days    PVR today 136mL    Gross hematuria: none  Tobacco hx: 40 pack years, quit 1995  Kidney stone hx: none  Fam h/o  malignancy: none     Microhematuria - CT scan with tiny stone <1mm, no intervention. Other non  findings reviewed and advised non urgent but could discuss with PCP further if desired.     History/Other:    Chief Complaint Reviewed and Verified  Nursing Notes Reviewed and   Verified  Allergies Reviewed  Medications Reviewed         Current Outpatient Medications   Medication Sig Dispense Refill    levothyroxine 88 MCG Oral Tab TAKE 1 TABLET BY MOUTH DAILY BEFORE BREAKFAST 90 tablet 0    FESOTERODINE FUMARATE ER 8 MG Oral Tablet 24 Hr TAKE 1 TABLET(8 MG) BY MOUTH DAILY 90 tablet 0    Mirabegron ER (MYRBETRIQ) 50 MG Oral Tablet 24 Hr Take 1 tablet (50 mg total) by mouth daily. 90 tablet 3    donepezil 10 MG Oral Tab Take 1 tablet (10 mg total) by mouth nightly. 90 tablet 3    Meloxicam 15 MG Oral Tab Take 1 tablet (15 mg total) by mouth daily as needed for Pain. 30 tablet 0    pantoprazole 40 MG Oral Tab EC Take 1 tablet (40 mg total) by mouth every morning. 90  tablet 3    ROSUVASTATIN 40 MG Oral Tab TAKE 1 TABLET(40 MG) BY MOUTH DAILY 90 tablet 3    docusate sodium 100 MG Oral Cap Take 1 capsule (100 mg total) by mouth 2 (two) times daily as needed for constipation. 60 capsule 3    EZETIMIBE 10 MG Oral Tab TAKE 1 TABLET(10 MG) BY MOUTH DAILY 90 tablet 3    metoprolol tartrate 25 MG Oral Tab TAKE 1 TABLET BY MOUTH EVERY DAY 90 tablet 3    Multiple Vitamin (ONE-DAILY MULTI VITAMINS) Oral Tab Take 1 tablet by mouth 3 (three) times a week.      aspirin 81 MG Oral Tab Take 1 tablet (81 mg total) by mouth daily.         Review of Systems:  Pertinent items are noted in HPI.      Objective:   There were no vitals taken for this visit. Estimated body mass index is 29.71 kg/m² as calculated from the following:    Height as of 2/27/24: 5' (1.524 m).    Weight as of 2/27/24: 152 lb 2 oz (69 kg).    No distress  Non labored respirations    Laboratory Data:  Lab Results   Component Value Date    WBC 4.7 08/21/2021    HGB 13.9 08/21/2021    .0 08/21/2021     Lab Results   Component Value Date     02/24/2023    K 4.3 02/24/2023     02/24/2023    CO2 27.0 02/24/2023    BUN 18 02/24/2023     (H) 02/24/2023    GFRAA 36 (L) 10/29/2021    AST 35 02/24/2023    ALT 67 (H) 02/24/2023    TP 7.0 02/24/2023    ALB 3.6 02/24/2023    CA 9.9 02/24/2023       Urinalysis Results (last three years):  Recent Labs     08/21/21  1802 10/29/21  0730 05/04/23  1047 08/07/24  1118   COLORUR Straw Straw  --   --    CLARITY Clear Clear  --   --    SPECGRAVITY 1.005 1.015 1.025 1.020   PHURINE 6.0 6.0 6.5 6.5   PROUR Negative Negative  --   --    GLUUR Negative Negative  --   --    KETUR Negative Negative  --   --    BILUR Negative Negative  --   --    BLOODURINE Small* Negative  --   --    NITRITE Negative Negative Negative Negative   UROBILINOGEN <2.0 <2.0  --   --    LEUUR Negative Large*  --   --    WBCUR 1-5 21-50*  --   --    RBCUR None Seen None Seen  --   --    BACUR None Seen  None Seen  --   --        Urine Culture Results (last three years):  Lab Results   Component Value Date    URINECUL No Growth 2 Days 05/04/2023    URINECUL No Growth at 18-24 hrs. 10/29/2021    URINECUL No Growth at 18-24 hrs. 08/03/2021    URINECUL >100,000 CFU/ML Escherichia coli (A) 10/24/2019    URINECUL >100,000 CFU/ML Gram positive javi 04/09/2015       Imaging  No results found.    Assessment & Plan:   OAB with UUI    Reviewed with patient and son potential contributing factors including dietary, bowels, mobility, and cognitive state. Remains unclear if she is taking both myrbetriq and toviaz. Recommend trial of dual therapy given concern for QOL due to incontinence, if no additional benefit noted can discontinue toviaz and continue myrbetriq as son notices difference  Encouraged avoidance of postponed voids and bowel regimen.   We also reviewed purewick system and IFC for severe cases.   Follow up pending.    The above impression and plan were discussed in detail with the patient who verbalized understanding and all questions were answered.  A total of 30 minutes were spent on the visit including chart review in preparation for the visit, time with the patient, placing orders and communication with other providers where appropriate.      Dianne Wilson PA-C, 8/7/2024

## 2024-09-23 ENCOUNTER — TELEPHONE (OUTPATIENT)
Dept: FAMILY MEDICINE CLINIC | Facility: CLINIC | Age: 89
End: 2024-09-23

## 2024-09-23 DIAGNOSIS — E55.9 VITAMIN D DEFICIENCY: Primary | ICD-10-CM

## 2024-09-23 DIAGNOSIS — R73.01 ELEVATED FASTING GLUCOSE: ICD-10-CM

## 2024-09-23 DIAGNOSIS — E78.2 MIXED HYPERLIPIDEMIA: ICD-10-CM

## 2024-09-23 DIAGNOSIS — E03.9 ACQUIRED HYPOTHYROIDISM: ICD-10-CM

## 2024-09-23 DIAGNOSIS — Z13.0 SCREENING, ANEMIA, DEFICIENCY, IRON: ICD-10-CM

## 2024-09-23 NOTE — TELEPHONE ENCOUNTER
Please enter lab orders for the patient's upcoming physical appointment.     Physical scheduled:   Your appointments       Date & Time Appointment Department ([place])    Oct 07, 2024 10:00 AM CDT Medicare Annual Well Visit with Michael Gonzalez MD [facility] ([facility])              [facility]  [address] [place] [address] [place] [id number]  [phone]           Preferred lab: [facility] ([facility])     The patient has been notified to complete fasting labs prior to their physical appointment.

## 2024-10-07 ENCOUNTER — OFFICE VISIT (OUTPATIENT)
Dept: FAMILY MEDICINE CLINIC | Facility: CLINIC | Age: 89
End: 2024-10-07
Payer: MEDICARE

## 2024-10-07 VITALS
OXYGEN SATURATION: 95 % | HEART RATE: 74 BPM | RESPIRATION RATE: 16 BRPM | HEIGHT: 60 IN | SYSTOLIC BLOOD PRESSURE: 130 MMHG | WEIGHT: 140.13 LBS | DIASTOLIC BLOOD PRESSURE: 60 MMHG | BODY MASS INDEX: 27.51 KG/M2

## 2024-10-07 DIAGNOSIS — F02.80 ALZHEIMER'S DISEASE, UNSPECIFIED (HCC): ICD-10-CM

## 2024-10-07 DIAGNOSIS — K21.9 GASTROESOPHAGEAL REFLUX DISEASE: ICD-10-CM

## 2024-10-07 DIAGNOSIS — M17.11 ARTHRITIS OF RIGHT KNEE: ICD-10-CM

## 2024-10-07 DIAGNOSIS — Z95.1 S/P CABG X 5: ICD-10-CM

## 2024-10-07 DIAGNOSIS — Z66 DNR (DO NOT RESUSCITATE): ICD-10-CM

## 2024-10-07 DIAGNOSIS — I10 PRIMARY HYPERTENSION: ICD-10-CM

## 2024-10-07 DIAGNOSIS — N39.41 URGE INCONTINENCE: ICD-10-CM

## 2024-10-07 DIAGNOSIS — R73.01 ELEVATED FASTING GLUCOSE: ICD-10-CM

## 2024-10-07 DIAGNOSIS — E78.2 MIXED HYPERLIPIDEMIA: ICD-10-CM

## 2024-10-07 DIAGNOSIS — Z00.00 ENCOUNTER FOR ANNUAL HEALTH EXAMINATION: Primary | ICD-10-CM

## 2024-10-07 DIAGNOSIS — G30.9 ALZHEIMER'S DISEASE, UNSPECIFIED (HCC): ICD-10-CM

## 2024-10-07 DIAGNOSIS — E03.9 ACQUIRED HYPOTHYROIDISM: ICD-10-CM

## 2024-10-07 DIAGNOSIS — I25.10 CORONARY ARTERY DISEASE INVOLVING NATIVE HEART WITHOUT ANGINA PECTORIS, UNSPECIFIED VESSEL OR LESION TYPE: ICD-10-CM

## 2024-10-07 PROBLEM — A41.9 SEPSIS SECONDARY TO UTI (HCC): Status: RESOLVED | Noted: 2021-08-04 | Resolved: 2024-10-07

## 2024-10-07 PROBLEM — N39.0 SEPSIS SECONDARY TO UTI (HCC): Status: RESOLVED | Noted: 2021-08-04 | Resolved: 2024-10-07

## 2024-10-07 PROBLEM — N30.00 ACUTE CYSTITIS WITHOUT HEMATURIA: Status: RESOLVED | Noted: 2021-08-03 | Resolved: 2024-10-07

## 2024-10-07 RX ORDER — ROSUVASTATIN CALCIUM 40 MG/1
40 TABLET, COATED ORAL DAILY
Qty: 90 TABLET | Refills: 3 | Status: SHIPPED | OUTPATIENT
Start: 2024-10-07

## 2024-10-07 RX ORDER — METOPROLOL TARTRATE 25 MG/1
TABLET, FILM COATED ORAL
Qty: 90 TABLET | Refills: 3 | Status: SHIPPED | OUTPATIENT
Start: 2024-10-07

## 2024-10-07 RX ORDER — FESOTERODINE FUMARATE 8 MG/1
8 TABLET, FILM COATED, EXTENDED RELEASE ORAL DAILY
Qty: 90 TABLET | Refills: 3 | Status: SHIPPED | OUTPATIENT
Start: 2024-10-07

## 2024-10-07 RX ORDER — PANTOPRAZOLE SODIUM 40 MG/1
40 TABLET, DELAYED RELEASE ORAL EVERY MORNING
Qty: 90 TABLET | Refills: 3 | Status: SHIPPED | OUTPATIENT
Start: 2024-10-07

## 2024-10-07 RX ORDER — MEMANTINE HYDROCHLORIDE 5 MG/1
5 TABLET ORAL 2 TIMES DAILY
Qty: 90 TABLET | Refills: 1 | Status: SHIPPED | OUTPATIENT
Start: 2024-10-07

## 2024-10-07 RX ORDER — EZETIMIBE 10 MG/1
10 TABLET ORAL DAILY
Qty: 90 TABLET | Refills: 3 | Status: SHIPPED | OUTPATIENT
Start: 2024-10-07

## 2024-10-07 RX ORDER — LEVOTHYROXINE SODIUM 88 UG/1
88 TABLET ORAL
Qty: 90 TABLET | Refills: 3 | Status: SHIPPED | OUTPATIENT
Start: 2024-10-07

## 2024-10-07 RX ORDER — MELOXICAM 15 MG/1
15 TABLET ORAL DAILY PRN
Qty: 90 TABLET | Refills: 1 | Status: SHIPPED | OUTPATIENT
Start: 2024-10-07

## 2024-10-07 RX ORDER — DONEPEZIL HYDROCHLORIDE 10 MG/1
10 TABLET, FILM COATED ORAL NIGHTLY
Qty: 90 TABLET | Refills: 3 | Status: SHIPPED | OUTPATIENT
Start: 2024-10-07

## 2024-10-07 NOTE — PROGRESS NOTES
Subjective:   Abbi Perez is a 89 year old female who presents for a Subsequent Annual Wellness visit (Pt already had Initial Annual Wellness) and scheduled follow up of multiple significant but stable problems.   Preventative  Breast: last in 2019.    Colon: no indication at 88yo.  Pap: no indication  Immunizations: Pneumonia up-to-date x 2.  Flu annually  DEXA: n/a    Heart - CAD s/p CABG, HTN, HLD.  On metoprolol, Zetia, Crestor, ASA.  Denies chest pain or limitations.      Thyroid - last TSH slightly elevated 18 months ago.  Taking 88mcg supplement.     Dementia - on donepezil.  In memory care at [facility].  Becoming more of an issues as well.      Sugars - no longer diabetic.      Vision - getting worse over time.  Cannot read.  Due to go to [facility].  Macular degeneration.  Seeing glasses doctors upcoming.      Ortho - R knee.  Limits her some.  On meloxicam and tylenol..  this helps some.  Also getting therapy. Uses a 2-wheel walker when she gets around.      History/Other:   Fall Risk Assessment:   She has been screened for Falls and is High Risk. Fall Prevention information provided to patient in After Visit Summary.    Do you feel unsteady when standing or walking?: Yes  Do you worry about falling?: Yes  Have you fallen in the past year?: Yes  How many times have you fallen?: (P) 3  Were you injured?: (P) No     Cognitive Assessment:   She had a completely normal cognitive assessment - see flowsheet entries     Functional Ability/Status:   Abbi Perez has some abnormal functions as listed below:  She has Dressing and/or Bathing issues based on screening of functional status.  Difficulty dressing or bathing?: Yes  Bathing or Showering: Cannot do without help  Dressing: Able without help  She has Driving difficulties based on screening of functional status. She has Meal Preparation difficulties based on screening of functional status.She has difficulties Managing Money/Bills based on  screening of functional status.She has difficulties Shopping for Groceries based on screening of functional status. She has difficulties Taking Meds as Rx'd based on screening of functional status. She has Vision problems based on screening of functional status. She has Walking problems based on screening of functional status. She has problems with Daily Activities based on screening of functional status. She has problems with Memory based on screening of functional status.       Depression Screening (PHQ):  PHQ-2 SCORE: 0  , done 10/3/2024   If you checked off any problems, how difficult have these problems made it for you to do your work, take care of things at home, or get along with other people?: Not difficult at all              Advanced Directives:   She does have a Living Will but we do NOT have it on file in Epic.    She does have a POA but we do NOT have it on file in Epic.    Discussed Advance Care Planning with patient (and family/surrogate if present). Standard forms made available to patient in After Visit Summary.      Patient Active Problem List   Diagnosis    Vitamin D deficiency    Hypothyroidism    Mixed hyperlipidemia    Esophageal reflux    Plantar fascial fibromatosis    Urge incontinence    Hypertension    S/P CABG x 5    Bursitis of shoulder, right    Knee bursitis, left    CAD (coronary artery disease)    Lower extremity edema    Elevated fasting glucose    Altered mental status    Alzheimer's disease, unspecified (HCC)    Chronic pain of right knee    DNR (do not resuscitate)     Allergies:  She has No Known Allergies.    Current Medications:  Outpatient Medications Marked as Taking for the 10/7/24 encounter (Office Visit) with Michael Gonzalez MD   Medication Sig    memantine 5 MG Oral Tab Take 1 tablet (5 mg total) by mouth 2 (two) times daily.    ezetimibe 10 MG Oral Tab Take 1 tablet (10 mg total) by mouth daily.    metoprolol tartrate 25 MG Oral Tab TAKE 1 TABLET BY MOUTH EVERY DAY     rosuvastatin 40 MG Oral Tab Take 1 tablet (40 mg total) by mouth daily.    pantoprazole 40 MG Oral Tab EC Take 1 tablet (40 mg total) by mouth every morning.    Meloxicam 15 MG Oral Tab Take 1 tablet (15 mg total) by mouth daily as needed for Pain.    donepezil 10 MG Oral Tab Take 1 tablet (10 mg total) by mouth nightly.    Fesoterodine Fumarate ER 8 MG Oral Tablet 24 Hr Take 1 tablet (8 mg total) by mouth daily.    levothyroxine 88 MCG Oral Tab Take 1 tablet (88 mcg total) by mouth before breakfast.    Mirabegron ER (MYRBETRIQ) 50 MG Oral Tablet 24 Hr Take 1 tablet (50 mg total) by mouth daily.    docusate sodium 100 MG Oral Cap Take 1 capsule (100 mg total) by mouth 2 (two) times daily as needed for constipation.    Multiple Vitamin (ONE-DAILY MULTI VITAMINS) Oral Tab Take 1 tablet by mouth 3 (three) times a week.    aspirin 81 MG Oral Tab Take 1 tablet (81 mg total) by mouth daily.       Medical History:  She  has a past medical history of Atherosclerosis of coronary artery, Bursitis of shoulder, right, Chest pain, Coronary atherosclerosis  (6/29/2012), Disorder of thyroid, Esophageal reflux (6/29/2012), Essential hypertension, Hyperlipidemia (6/29/2012), Hypertension, benign, Hypothyroidism (6/29/2012), S/P CABG x 5, Type II  diabetes mellitus  (6/29/2012), and Unspecified vitamin D deficiency (6/29/2012).  Surgical History:  She  has a past surgical history that includes colonoscopy (11/2004); cabg (1995); surgical stent; leg/ankle surgery proc unlisted (1997); leg/ankle surgery proc unlisted (05/2002); tonsillectomy (1941); total abdom hysterectomy (1985); angioplasty (coronary) (5/30/02); angioplasty (coronary) (6/23/04); and hysterectomy (1985).   Family History:  Her family history includes Heart Disease in her brother and mother.  Social History:  She  reports that she quit smoking about 29 years ago. Her smoking use included cigarettes. She has never used smokeless tobacco. She reports that she does not  currently use alcohol. She reports that she does not use drugs.    Tobacco:  She smoked tobacco in the past but quit greater than 12 months ago.  Social History     Tobacco Use   Smoking Status Former    Current packs/day: 0.00    Types: Cigarettes    Quit date: 1995    Years since quittin.7   Smokeless Tobacco Never        CAGE Alcohol Screen:   CAGE screening score of 0 on 10/3/2024, showing low risk of alcohol abuse.      Patient Care Team:  Michael Gonzalez MD as PCP - General (Family Medicine)  Tono Rivera MD (Cardiovascular Diseases)  Law Covington MD (SURGERY, ORTHOPEDIC)  Felicitas Polanco PT as Physical Therapist  Dianne Miller PA-C (Physician Assistant)    Review of Systems  Constitutional: negative  Eyes: vision changes  Ears, nose, mouth, throat, and face: negative  Respiratory: negative  Cardiovascular: negative  Gastrointestinal: negative  Genitourinary: negative  Neurological: negative  MSK: R knee pains.       Objective:   Physical Exam  General Appearance:  Alert, cooperative, no distress, appears stated age.  With her son.  Has a 2-wheel walker with her.    Head:  Normocephalic, without obvious abnormality, atraumatic   Eyes:  PERRL, conjunctiva/corneas clear, EOM's intact   Neck: Supple, symmetrical, trachea midline, no adenopathy   Back:   Symmetric, no curvature, ROM normal, no CVA tenderness   Lungs:   Clear to auscultation bilaterally, respirations unlabored   Chest Wall:  No tenderness or deformity   Heart:  Regular rate and rhythm   Abdomen:   Soft, non-tender, bowel sounds active all four quadrants,  no masses, no organomegaly   Extremities: Extremities normal, atraumatic, no cyanosis or edema   Pulses: 2+ and symmetric   Skin: Skin color, texture, turgor normal, no rashes or lesions   Neurologic: Normal        /60   Pulse 74   Resp 16   Ht 5' (1.524 m)   Wt 140 lb 2 oz (63.6 kg)   SpO2 95%   BMI 27.37 kg/m²  Estimated body mass index is 27.37 kg/m² as  calculated from the following:    Height as of this encounter: 5' (1.524 m).    Weight as of this encounter: 140 lb 2 oz (63.6 kg).    Medicare Hearing Assessment:   Hearing Screening    Time taken: 10/7/2024 10:08 AM  Screening Method: Whisper Test  Whisper Test Result: Pass         Visual Acuity:   Right Eye Visual Acuity: Corrected     Left Eye Visual Acuity: Corrected     Both Eyes Visual Acuity: Corrected     Able To Tolerate Visual Acuity: Yes        Assessment & Plan:     Abbi Perez was seen in the office today:  had concerns including Health Maintenance (MAW).    1. Encounter for annual health examination  Overall doing fair  Some of her biggest issues today are the changes in her vision, and the continued right knee pains  Due for labs.  Discussed getting them at the lab next-door today  DNR discussed and signed today    2. Coronary artery disease involving native heart without angina pectoris, unspecified vessel or lesion type  3. S/P CABG x 5  Known heart disease, but asymptomatic  She denies any chest pains or symptoms  No heart issues for the last 25 to 30 years  Continue medications to control her risk factors    4. Primary hypertension  Blood pressure controlled today  Metoprolol medicine refilled  - metoprolol tartrate 25 MG Oral Tab; TAKE 1 TABLET BY MOUTH EVERY DAY  Dispense: 90 tablet; Refill: 3    5. Mixed hyperlipidemia  Lipids at goal historically.  Labs due again  On Zetia and Crestor  - ezetimibe 10 MG Oral Tab; Take 1 tablet (10 mg total) by mouth daily.  Dispense: 90 tablet; Refill: 3  - rosuvastatin 40 MG Oral Tab; Take 1 tablet (40 mg total) by mouth daily.  Dispense: 90 tablet; Refill: 3    6. Elevated fasting glucose  No recent A1c, but history of diabetes now resolved  Continue healthy food choices.  Limited with activity due to her knee pains    7. Moderate Alzheimer's dementia without behavioral disturbance, psychotic disturbance, mood disturbance, or anxiety, unspecified timing  of dementia onset (HCC)  Dementia worsening overall  On donepezil, but feels like still having some issues, mainly in word finding  Will add memantine  - memantine 5 MG Oral Tab; Take 1 tablet (5 mg total) by mouth 2 (two) times daily.  Dispense: 90 tablet; Refill: 1  - donepezil 10 MG Oral Tab; Take 1 tablet (10 mg total) by mouth nightly.  Dispense: 90 tablet; Refill: 3    8. Acquired hypothyroidism  TSH labs due  Has been taking levothyroxine 88.  Will ensure that this is the proper dosing  - levothyroxine 88 MCG Oral Tab; Take 1 tablet (88 mcg total) by mouth before breakfast.  Dispense: 90 tablet; Refill: 3    9. DNR (do not resuscitate)  Discussed DNR status with her son and herself.  They do not want chest compressions.  No intubation, no artificial nutrients  POLST form completed in the chart, they were given a copy.    10. Gastroesophageal reflux disease  Medicine refilled  This has been effective for her reflux  - pantoprazole 40 MG Oral Tab EC; Take 1 tablet (40 mg total) by mouth every morning.  Dispense: 90 tablet; Refill: 3    11. Arthritis of right knee  One of her biggest ongoing medical issues is the right knee pain.  Not a lot of advanced treatment options at this point  Meloxicam does help.  Recommend she maintain a good level of activity as well  - Meloxicam 15 MG Oral Tab; Take 1 tablet (15 mg total) by mouth daily as needed for Pain.  Dispense: 90 tablet; Refill: 1      13. Urge incontinence  Medicines have been effective  Refills provided  - Fesoterodine Fumarate ER 8 MG Oral Tablet 24 Hr; Take 1 tablet (8 mg total) by mouth daily.  Dispense: 90 tablet; Refill: 3            The patient indicates understanding of these issues and agrees to the plan.  Reinforced healthy diet, lifestyle, and exercise.      Return in about 6 months (around 4/7/2025) for check up.     Michael Gonzalez MD, 10/7/2024     Supplementary Documentation:   General Health:  In the past six months, have you lost more than  10 pounds without trying?: 3 - Don't know  Has your appetite been poor?: No  Type of Diet: Balanced  How does the patient maintain a good energy level?: Other  How would you describe your daily physical activity?: None  How would you describe your current health state?: Fair  How do you maintain positive mental well-being?: Social Interaction;Visiting Friends;Visiting Family  On a scale of 0 to 10, with 0 being no pain and 10 being severe pain, what is your pain level?: 5 - (Moderate)  In the past six months, have you experienced urine leakage?: 1-Yes  At any time do you feel concerned for the safety/well-being of yourself and/or your children, in your home or elsewhere?: No  Have you had any immunizations at another office such as Influenza, Hepatitis B, Tetanus, or Pneumococcal?: Yes    Health Maintenance   Topic Date Due    Zoster Vaccines (1 of 2) Never done    Colorectal Cancer Screening  08/24/2020    Annual Depression Screening  01/01/2024    COVID-19 Vaccine (6 - 2023-24 season) 09/01/2024    Annual Physical  10/09/2024    Influenza Vaccine  Completed    Fall Risk Screening (Annual)  Completed    Pneumococcal Vaccine: 65+ Years  Completed

## 2024-10-18 DIAGNOSIS — I10 PRIMARY HYPERTENSION: ICD-10-CM

## 2024-10-24 RX ORDER — METOPROLOL TARTRATE 25 MG/1
TABLET, FILM COATED ORAL
Qty: 90 TABLET | Refills: 3 | OUTPATIENT
Start: 2024-10-24

## 2024-10-24 NOTE — TELEPHONE ENCOUNTER
#90 with 3 refills sent 10/07/24 has enough for a year. Please have patient call pharmacy.    Refused

## 2024-12-11 ENCOUNTER — OFFICE VISIT (OUTPATIENT)
Dept: PODIATRY CLINIC | Facility: CLINIC | Age: 89
End: 2024-12-11

## 2024-12-11 DIAGNOSIS — M20.41 HAMMER TOES OF BOTH FEET: ICD-10-CM

## 2024-12-11 DIAGNOSIS — G30.9 ALZHEIMER'S DISEASE, UNSPECIFIED (HCC): ICD-10-CM

## 2024-12-11 DIAGNOSIS — F02.80 ALZHEIMER'S DISEASE, UNSPECIFIED (HCC): ICD-10-CM

## 2024-12-11 DIAGNOSIS — M20.42 HAMMER TOES OF BOTH FEET: ICD-10-CM

## 2024-12-11 DIAGNOSIS — B35.1 ONYCHOMYCOSIS: Primary | ICD-10-CM

## 2024-12-11 DIAGNOSIS — R73.01 ELEVATED FASTING GLUCOSE: ICD-10-CM

## 2024-12-11 PROCEDURE — 99213 OFFICE O/P EST LOW 20 MIN: CPT | Performed by: STUDENT IN AN ORGANIZED HEALTH CARE EDUCATION/TRAINING PROGRAM

## 2024-12-11 NOTE — PROGRESS NOTES
[facility]iat[facility]  Progress Note    Abbi Perez is a 89 year old female.   Chief Complaint   Patient presents with    Toenail Care     Nail care and foot check-          HPI:     Patient is a pleasant 89-year-old female presents to clinic for foot exam.  She has elongated thickened nails she has difficulty trimming on her own.  They are incurvated and painful especially the right foot.  She also has painful hammertoe deformities.  She denies any open sores or other concerns.  She has difficulty reaching her nails to trim them on her own.  Past medical history, medications, allergies reviewed.      Allergies: Patient has no known allergies.   Current Outpatient Medications   Medication Sig Dispense Refill    memantine 5 MG Oral Tab Take 1 tablet (5 mg total) by mouth 2 (two) times daily. 90 tablet 1    ezetimibe 10 MG Oral Tab Take 1 tablet (10 mg total) by mouth daily. 90 tablet 3    metoprolol tartrate 25 MG Oral Tab TAKE 1 TABLET BY MOUTH EVERY DAY 90 tablet 3    rosuvastatin 40 MG Oral Tab Take 1 tablet (40 mg total) by mouth daily. 90 tablet 3    pantoprazole 40 MG Oral Tab EC Take 1 tablet (40 mg total) by mouth every morning. 90 tablet 3    Meloxicam 15 MG Oral Tab Take 1 tablet (15 mg total) by mouth daily as needed for Pain. 90 tablet 1    donepezil 10 MG Oral Tab Take 1 tablet (10 mg total) by mouth nightly. 90 tablet 3    Fesoterodine Fumarate ER 8 MG Oral Tablet 24 Hr Take 1 tablet (8 mg total) by mouth daily. 90 tablet 3    levothyroxine 88 MCG Oral Tab Take 1 tablet (88 mcg total) by mouth before breakfast. 90 tablet 3    Mirabegron ER (MYRBETRIQ) 50 MG Oral Tablet 24 Hr Take 1 tablet (50 mg total) by mouth daily. 90 tablet 3    docusate sodium 100 MG Oral Cap Take 1 capsule (100 mg total) by mouth 2 (two) times daily as needed for constipation. 60 capsule 3    Multiple Vitamin (ONE-DAILY MULTI VITAMINS) Oral Tab Take 1 tablet by mouth 3 (three) times a week.      aspirin 81 MG Oral Tab Take  1 tablet (81 mg total) by mouth daily.        Past Medical History:    Atherosclerosis of coronary artery    Bursitis of shoulder, right    Chest pain    Coronary atherosclerosis     Disorder of thyroid    Esophageal reflux    Essential hypertension    Hyperlipidemia    Hypertension, benign    Hypothyroidism    S/P CABG x 5    Type II  diabetes mellitus     Unspecified vitamin D deficiency      Past Surgical History:   Procedure Laterality Date    Angioplasty (coronary)  02    RCA graft stent    Angioplasty (coronary)  04    stent RCA graft, IABP    Cabg  1995    x5    Colonoscopy  2004    Hysterectomy  1985    partial hystero.    Leg/ankle surgery proc unlisted      angioplasty of left leg    Leg/ankle surgery proc unlisted  2002    angioplasty of right leg    Surgical stent      Coronary Artery Stent    Tonsillectomy  89    Total abdom hysterectomy  1985      Family History   Problem Relation Age of Onset    Heart Disease Brother         Triple CABG    Heart Disease Mother       Social History     Socioeconomic History    Marital status:     Number of children: 2   Occupational History    Occupation: retired   Tobacco Use    Smoking status: Former     Current packs/day: 0.00     Types: Cigarettes     Quit date: 1995     Years since quittin.9    Smokeless tobacco: Never   Vaping Use    Vaping status: Never Used   Substance and Sexual Activity    Alcohol use: Not Currently     Alcohol/week: 0.0 - 1.0 standard drinks of alcohol     Comment: 1 per month    Drug use: No   Other Topics Concern    Caffeine Concern Yes     Comment: 1-2 cups of coffee daily    Exercise No     Comment: active    Seat Belt Yes    Self-Exams No           REVIEW OF SYSTEMS:     Today reviewed systems as documented below  GENERAL HEALTH: feels well otherwise  SKIN: denies any unusual skin lesions or rashes  RESPIRATORY: denies shortness of breath with exertion  CARDIOVASCULAR: denies chest pain on  exertion  GI: denies abdominal pain and denies heartburn  NEURO: denies headaches  MUSCULO: uses walker for ambulation      EXAM:   There were no vitals taken for this visit.  GENERAL: well developed, well nourished, in no apparent distress  EXTREMITIES:   1. Integument: Normal skin temperature and turgor.  Nails x 10 are elongated, thickened, dystrophic, and with subungual debris.  Nails are also incurvated.  2. Vascular: Pedal pulses are palpable.  Lower extremity edema noted.   3. Musculoskeletal: All muscle groups are graded 5 out of 5 in the foot and ankle.  Flexion contracture of lesser digits.  Pain noted ingrown nails.   4. Neurological: Normal sharp dull sensation; reflexes normal.          ASSESSMENT AND PLAN:   Diagnoses and all orders for this visit:    Onychomycosis    Hammer toes of both feet    Elevated fasting glucose    Alzheimer's disease, unspecified (HCC)            Plan:    -Patient examined, chart history reviewed.  -Discussed importance of proper pedal hygiene, regular foot checks.  -Sharply debrided nails x10 with a sterile nail nipper achieving a 20% reduction in thickness and length, without incident. Nails further smoothed with dremel.  Slant back performed areas of ingrown nails.  -Ambulate with supportive shoes and inserts and avoid walking barefoot.  -Educated patient on acute signs of infection advised patient to seek immediate medical attention if symptoms arise.     The patient indicates understanding of these issues and agrees to the plan.    RTC 2 to 3 months.    JOE[employer]gal[employer] speech recognition software was used to prepare this note.  Errors in word recognition may occur.  Please contact me with any questions/concerns with this note.

## 2025-01-13 DIAGNOSIS — F02.80 ALZHEIMER'S DISEASE, UNSPECIFIED (HCC): ICD-10-CM

## 2025-01-13 DIAGNOSIS — G30.9 ALZHEIMER'S DISEASE, UNSPECIFIED (HCC): ICD-10-CM

## 2025-01-20 DIAGNOSIS — E78.2 MIXED HYPERLIPIDEMIA: ICD-10-CM

## 2025-01-20 RX ORDER — MEMANTINE HYDROCHLORIDE 5 MG/1
5 TABLET ORAL 2 TIMES DAILY
Qty: 90 TABLET | Refills: 1 | Status: SHIPPED | OUTPATIENT
Start: 2025-01-20

## 2025-01-20 NOTE — TELEPHONE ENCOUNTER
No protocol    Requested Prescriptions     Pending Prescriptions Disp Refills    MEMANTINE 5 MG Oral Tab [Pharmacy Med Name: MEMANTINE 5MG TABLETS] 90 tablet 1     Sig: TAKE 1 TABLET(5 MG) BY MOUTH TWICE DAILY        Last refill: 10/7/24 90 tabs 1 refill    Last Appointment: LOV 10/7/2024     Next Appointment: Visit date not found

## 2025-01-23 RX ORDER — EZETIMIBE 10 MG/1
10 TABLET ORAL DAILY
Qty: 90 TABLET | Refills: 3 | OUTPATIENT
Start: 2025-01-23

## 2025-01-23 NOTE — TELEPHONE ENCOUNTER
10/07/24 CHELY a year supply was given please contact pharmacy for refills         #90 with 3 refills

## 2025-01-31 DIAGNOSIS — F02.80 ALZHEIMER'S DISEASE, UNSPECIFIED (HCC): ICD-10-CM

## 2025-01-31 DIAGNOSIS — G30.9 ALZHEIMER'S DISEASE, UNSPECIFIED (HCC): ICD-10-CM

## 2025-02-03 ENCOUNTER — APPOINTMENT (OUTPATIENT)
Dept: CT IMAGING | Facility: HOSPITAL | Age: 89
End: 2025-02-03
Attending: EMERGENCY MEDICINE
Payer: MEDICARE

## 2025-02-03 ENCOUNTER — HOSPITAL ENCOUNTER (EMERGENCY)
Facility: HOSPITAL | Age: 89
Discharge: HOME | End: 2025-02-03
Attending: EMERGENCY MEDICINE
Payer: MEDICARE

## 2025-02-03 ENCOUNTER — APPOINTMENT (OUTPATIENT)
Dept: GENERAL RADIOLOGY | Facility: HOSPITAL | Age: 89
End: 2025-02-03
Attending: EMERGENCY MEDICINE
Payer: MEDICARE

## 2025-02-03 VITALS
DIASTOLIC BLOOD PRESSURE: 45 MMHG | RESPIRATION RATE: 18 BRPM | BODY MASS INDEX: 27.26 KG/M2 | WEIGHT: 138.88 LBS | HEIGHT: 60 IN | TEMPERATURE: 98 F | SYSTOLIC BLOOD PRESSURE: 149 MMHG | OXYGEN SATURATION: 100 % | HEART RATE: 75 BPM

## 2025-02-03 DIAGNOSIS — R74.8 ELEVATED LIVER ENZYMES: ICD-10-CM

## 2025-02-03 DIAGNOSIS — M54.2 NECK PAIN: ICD-10-CM

## 2025-02-03 DIAGNOSIS — U07.1 COVID-19: Primary | ICD-10-CM

## 2025-02-03 LAB
ALBUMIN SERPL-MCNC: 3.9 G/DL (ref 3.2–4.8)
ALBUMIN/GLOB SERPL: 1.6 {RATIO} (ref 1–2)
ALP LIVER SERPL-CCNC: 90 U/L
ALT SERPL-CCNC: 124 U/L
ANION GAP SERPL CALC-SCNC: 9 MMOL/L (ref 0–18)
AST SERPL-CCNC: 133 U/L (ref ?–34)
BASOPHILS # BLD AUTO: 0.03 X10(3) UL (ref 0–0.2)
BASOPHILS NFR BLD AUTO: 0.5 %
BILIRUB SERPL-MCNC: 0.4 MG/DL (ref 0.2–1.1)
BILIRUB UR QL STRIP.AUTO: NEGATIVE
BUN BLD-MCNC: 18 MG/DL (ref 9–23)
CALCIUM BLD-MCNC: 9.5 MG/DL (ref 8.7–10.6)
CHLORIDE SERPL-SCNC: 103 MMOL/L (ref 98–112)
CLARITY UR REFRACT.AUTO: CLEAR
CO2 SERPL-SCNC: 28 MMOL/L (ref 21–32)
COLOR UR AUTO: YELLOW
CREAT BLD-MCNC: 1.37 MG/DL
EGFRCR SERPLBLD CKD-EPI 2021: 37 ML/MIN/1.73M2 (ref 60–?)
EOSINOPHIL # BLD AUTO: 0.01 X10(3) UL (ref 0–0.7)
EOSINOPHIL NFR BLD AUTO: 0.2 %
ERYTHROCYTE [DISTWIDTH] IN BLOOD BY AUTOMATED COUNT: 13.9 %
FLUAV + FLUBV RNA SPEC NAA+PROBE: NEGATIVE
FLUAV + FLUBV RNA SPEC NAA+PROBE: NEGATIVE
GLOBULIN PLAS-MCNC: 2.4 G/DL (ref 2–3.5)
GLUCOSE BLD-MCNC: 143 MG/DL (ref 70–99)
GLUCOSE UR STRIP.AUTO-MCNC: NORMAL MG/DL
HCT VFR BLD AUTO: 39 %
HGB BLD-MCNC: 13 G/DL
IMM GRANULOCYTES # BLD AUTO: 0.02 X10(3) UL (ref 0–1)
IMM GRANULOCYTES NFR BLD: 0.4 %
KETONES UR STRIP.AUTO-MCNC: NEGATIVE MG/DL
LACTATE SERPL-SCNC: 1.6 MMOL/L (ref 0.5–2)
LEUKOCYTE ESTERASE UR QL STRIP.AUTO: NEGATIVE
LYMPHOCYTES # BLD AUTO: 0.88 X10(3) UL (ref 1–4)
LYMPHOCYTES NFR BLD AUTO: 15.9 %
MCH RBC QN AUTO: 31.3 PG (ref 26–34)
MCHC RBC AUTO-ENTMCNC: 33.3 G/DL (ref 31–37)
MCV RBC AUTO: 93.8 FL
MONOCYTES # BLD AUTO: 0.58 X10(3) UL (ref 0.1–1)
MONOCYTES NFR BLD AUTO: 10.5 %
NEUTROPHILS # BLD AUTO: 4.02 X10 (3) UL (ref 1.5–7.7)
NEUTROPHILS # BLD AUTO: 4.02 X10(3) UL (ref 1.5–7.7)
NEUTROPHILS NFR BLD AUTO: 72.5 %
NITRITE UR QL STRIP.AUTO: NEGATIVE
OSMOLALITY SERPL CALC.SUM OF ELEC: 294 MOSM/KG (ref 275–295)
PH UR STRIP.AUTO: 6.5 [PH] (ref 5–8)
PLATELET # BLD AUTO: 186 10(3)UL (ref 150–450)
POTASSIUM SERPL-SCNC: 3.4 MMOL/L (ref 3.5–5.1)
PROT SERPL-MCNC: 6.3 G/DL (ref 5.7–8.2)
PROT UR STRIP.AUTO-MCNC: 100 MG/DL
RBC # BLD AUTO: 4.16 X10(6)UL
RBC UR QL AUTO: NEGATIVE
RSV RNA SPEC NAA+PROBE: NEGATIVE
SARS-COV-2 RNA RESP QL NAA+PROBE: DETECTED
SODIUM SERPL-SCNC: 140 MMOL/L (ref 136–145)
SP GR UR STRIP.AUTO: 1.02 (ref 1–1.03)
UROBILINOGEN UR STRIP.AUTO-MCNC: NORMAL MG/DL
WBC # BLD AUTO: 5.5 X10(3) UL (ref 4–11)

## 2025-02-03 PROCEDURE — 87077 CULTURE AEROBIC IDENTIFY: CPT | Performed by: EMERGENCY MEDICINE

## 2025-02-03 PROCEDURE — 87150 DNA/RNA AMPLIFIED PROBE: CPT | Performed by: EMERGENCY MEDICINE

## 2025-02-03 PROCEDURE — 70450 CT HEAD/BRAIN W/O DYE: CPT | Performed by: EMERGENCY MEDICINE

## 2025-02-03 PROCEDURE — 83605 ASSAY OF LACTIC ACID: CPT | Performed by: EMERGENCY MEDICINE

## 2025-02-03 PROCEDURE — 71045 X-RAY EXAM CHEST 1 VIEW: CPT | Performed by: EMERGENCY MEDICINE

## 2025-02-03 PROCEDURE — 81001 URINALYSIS AUTO W/SCOPE: CPT | Performed by: EMERGENCY MEDICINE

## 2025-02-03 PROCEDURE — 36415 COLL VENOUS BLD VENIPUNCTURE: CPT

## 2025-02-03 PROCEDURE — 85025 COMPLETE CBC W/AUTO DIFF WBC: CPT | Performed by: EMERGENCY MEDICINE

## 2025-02-03 PROCEDURE — 87040 BLOOD CULTURE FOR BACTERIA: CPT | Performed by: EMERGENCY MEDICINE

## 2025-02-03 PROCEDURE — 74177 CT ABD & PELVIS W/CONTRAST: CPT | Performed by: EMERGENCY MEDICINE

## 2025-02-03 PROCEDURE — 0241U SARS-COV-2/FLU A AND B/RSV BY PCR (GENEXPERT): CPT | Performed by: EMERGENCY MEDICINE

## 2025-02-03 PROCEDURE — 99284 EMERGENCY DEPT VISIT MOD MDM: CPT

## 2025-02-03 PROCEDURE — 80053 COMPREHEN METABOLIC PANEL: CPT | Performed by: EMERGENCY MEDICINE

## 2025-02-03 PROCEDURE — 72125 CT NECK SPINE W/O DYE: CPT | Performed by: EMERGENCY MEDICINE

## 2025-02-03 PROCEDURE — 99285 EMERGENCY DEPT VISIT HI MDM: CPT

## 2025-02-04 NOTE — ED QUICK NOTES
Patient's family at bedside.  They state the heard she fell today.  Patient appears to have neck pain with movement.  Patient awaiting CT.

## 2025-02-04 NOTE — ED INITIAL ASSESSMENT (HPI)
Brought in via medics,per medics pt's  states she had fever x2 days. Foul smell in urine, and generalized weakness today.

## 2025-02-04 NOTE — ED PROVIDER NOTES
Patient Seen in: [facility] Emergency Department      History     Chief Complaint   Patient presents with    Fever    Fatigue     Stated Complaint:     Subjective:   HPI    This is a 89-year-old woman history of dementia, CAD, hypertension hyperlipidemia here for evaluation of possible urine infection.  Patient was reportedly feeling fatigued according to nursing home staff, they changed her earlier and reported that her urine was malodorous.  No reported falls injury fevers vomiting diarrhea any other complaints or concerns.  Patient does report some discomfort in her neck no other complaints or concerns at this time.    Objective:     Past Medical History:    Atherosclerosis of coronary artery    Bursitis of shoulder, right    Chest pain    Coronary atherosclerosis     Disorder of thyroid    Esophageal reflux    Essential hypertension    Hyperlipidemia    Hypertension, benign    Hypothyroidism    S/P CABG x 5    Type II  diabetes mellitus     Unspecified vitamin D deficiency              Past Surgical History:   Procedure Laterality Date    Angioplasty (coronary)  02    RCA graft stent    Angioplasty (coronary)  04    stent RCA graft, IABP    Cabg  1995    x5    Colonoscopy  2004    Hysterectomy  1985    partial hystero.    Leg/ankle surgery proc unlisted      angioplasty of left leg    Leg/ankle surgery proc unlisted  2002    angioplasty of right leg    Surgical stent      Coronary Artery Stent    Tonsillectomy  89    Total abdom hysterectomy  1985                Social History     Socioeconomic History    Marital status:     Number of children: 2   Occupational History    Occupation: retired   Tobacco Use    Smoking status: Former     Current packs/day: 0.00     Types: Cigarettes     Quit date: 1995     Years since quittin.1    Smokeless tobacco: Never   Vaping Use    Vaping status: Never Used   Substance and Sexual Activity    Alcohol use: Not Currently      Alcohol/week: 0.0 - 1.0 standard drinks of alcohol     Comment: 1 per month    Drug use: No   Other Topics Concern    Caffeine Concern Yes     Comment: 1-2 cups of coffee daily    Exercise No     Comment: active    Seat Belt Yes    Self-Exams No     Social Drivers of Health     Financial Resource Strain: Low Risk  (8/6/2021)    Received from [facility], [facility]    Overall Financial Resource Strain (CARDIA)     Difficulty of Paying Living Expenses: Not hard at all   Transportation Needs: No Transportation Needs (8/6/2021)    Received from [facility], [facility]    PRAPARE - Transportation     Lack of Transportation (Medical): No     Lack of Transportation (Non-Medical): No    Received from [facility], [facility]    Social Connections    Received from [facility], [facility]    Housing Stability                  Physical Exam     ED Triage Vitals [02/03/25 1902]   /71   Pulse 67   Resp 18   Temp 99.3 °F (37.4 °C)   Temp src Oral   SpO2 97 %   O2 Device None (Room air)       Current Vitals:   Vital Signs  BP: 149/45  Pulse: 75  Resp: 18  Temp: 98.2 °F (36.8 °C)  Temp src: Oral    Oxygen Therapy  SpO2: 100 %  O2 Device: None (Room air)        Physical Exam      Physical Exam  Vitals signs and nursing note reviewed.   General:  Patient laying supine in the bed in no acute distress  Head: Normocephalic and atraumatic.   HEENT:  Mucous membranes are moist.   Cardiovascular:  Normal rate and regular rhythm.  No Edema  Pulmonary:  Pulmonary effort is normal.  Normal breath sounds. No wheezing, rhonchi or rales.   Abdominal: Soft nontender nondistended, normal bowel sounds, no guarding no rebound tenderness  Skin: Warm and dry  Neurological: Awake alert, speech is normal, motor 5/5 in bilateral upper and lower extremities.  sensation is grossly intact throughout.  No facial asymmetry.  Stable narrow  based gait.        ED Course     Labs Reviewed   COMP METABOLIC PANEL (14) - Abnormal; Notable for the following components:       Result Value    Glucose 143 (*)     Potassium 3.4 (*)     Creatinine 1.37 (*)     eGFR-Cr 37 (*)      (*)      (*)     All other components within normal limits   CBC WITH DIFFERENTIAL WITH PLATELET - Abnormal; Notable for the following components:    Lymphocyte Absolute 0.88 (*)     All other components within normal limits   URINALYSIS WITH CULTURE REFLEX - Abnormal; Notable for the following components:    Protein Urine 100 (*)     RBC Urine 3-5 (*)     Bacteria Urine Rare (*)     Squamous Epi. Cells Few (*)     All other components within normal limits   SARS-COV-2/FLU A AND B/RSV BY PCR (GENEXPERT) - Abnormal; Notable for the following components:    SARS-CoV-2 (COVID-19) - (GeneXpert) Detected (*)     All other components within normal limits    Narrative:     This test is intended for the qualitative detection and differentiation of SARS-CoV-2, influenza A, influenza B, and respiratory syncytial virus (RSV) viral RNA in nasopharyngeal or nares swabs from individuals suspected of respiratory viral infection consistent with COVID-19 by their healthcare provider. Signs and symptoms of respiratory viral infection due to SARS-CoV-2, influenza, and RSV can be similar.    Test performed using the Xpert Xpress SARS-CoV-2/FLU/RSV (real time RT-PCR)  assay on the GeneXpert instrument, [employer], [place] [address].   This test is being used under the Food and Drug Administration's Emergency Use Authorization.    The authorized Fact Sheet for Healthcare Providers for this assay is available upon request from the laboratory.   LACTIC ACID, PLASMA - Normal   RAINBOW DRAW LAVENDER   RAINBOW DRAW LIGHT GREEN   RAINBOW DRAW BLUE   BLOOD CULTURE   BLOOD CULTURE            CT ABDOMEN+PELVIS(CONTRAST ONLY)(CPT=74177)    Result Date: 2/3/2025  PROCEDURE:  CT ABDOMEN+PELVIS (CONTRAST  ONLY) (CPT=74177)  COMPARISON:  None.  INDICATIONS:  Patient presents with abdominal pain and elevated hepatic enzymes.  TECHNIQUE:  CT scanning was performed from the dome of the diaphragm to the pubic symphysis with non-ionic intravenous contrast material. Post contrast coronal MPR imaging was performed.  Dose reduction techniques were used. Dose information is transmitted to the ACR (American College of Radiology) NRDR (National Radiology Data Registry) which includes the Dose Index Registry.  PATIENT STATED HISTORY:(As transcribed by Technologist)  Elevated liver enzymes   CONTRAST USED:  65cc of Isovue 370  FINDINGS:  LIVER:  No hepatomegaly noted.  There are multiple low-density nonenhancing intrahepatic cysts, the largest of which is seen within the medial segment left lobe of the liver measuring 1.2 x 1.3 cm. No enhancing or suspicious hepatic lesions. BILIARY:  There are multiple gallstones in the gallbladder lumen.  No gross evidence of acute cholecystitis or biliary ductal dilatation. PANCREAS:  No lesion, fluid collection, ductal dilatation, or atrophy.  SPLEEN:  No enlargement or focal lesion.  KIDNEYS:  There are multiple simple nonenhancing bilateral renal cysts, the largest of which is seen exophytically along the left superior pole measuring 4.8 x 4.2 cm.  No renal stones or hydronephrosis.  ADRENALS:  No mass or enlargement.  AORTA/VASCULAR:  Extensive atherosclerosis of the aorta and iliac vessels without evidence of aneurysm. RETROPERITONEUM:  No mass or adenopathy.  BOWEL/MESENTERY:  Small to moderate-sized hiatal hernia at the GE junction measuring approximately 2.9 x 5.6 cm. No visible mass, obstruction, or bowel wall thickening.  Mild uncomplicated diverticular changes of the sigmoid colon.  Moderate colonic fecal retention. ABDOMINAL WALL:  No mass or hernia.  URINARY BLADDER:  No visible focal wall thickening, lesion, or calculus.  PELVIC NODES:  No adenopathy.  PELVIC ORGANS:  No visible  mass.  Pelvic organs appropriate for patient age.  BONES:  No acute osseous injuries.  There is extensive multilevel disc disease of the thoracolumbar spine with multilevel vacuum disc disease.  Mild bilateral hip osteoarthritis. LUNG BASES:  No visible pulmonary or pleural disease.             CONCLUSION:  1. There is no discrete evidence of an acute intra-abdominal or pelvic process. 2. Multiple small intrahepatic cysts which are benign/nonenhancing simple cysts.  No further workup required or recommended. 3. Cholelithiasis without discrete CT evidence of acute cholecystitis or biliary ductal dilatation. 4. Multiple simple nonenhancing renal cysts consistent with benign cysts.  No further workup required or recommended. 5. Uncomplicated diverticular changes of the sigmoid colon. 6. Please see the body of the report above for further details.   LOCATION:  [facility]   Dictated by (CST): Edwige Linda DO on 2/03/2025 at 9:50 PM     Finalized by (CST): Edwige Linda DO on 2/03/2025 at 9:56 PM       CT SPINE CERVICAL (CPT=72125)    Result Date: 2/3/2025  PROCEDURE:  CT SPINE CERVICAL (CPT=72125)  COMPARISON:  None.  INDICATIONS:  Patient presents with altered mental status, neck pain and upper extremity weakness.  TECHNIQUE:  Noncontrast CT scanning of the cervical spine is performed from the skull base through C7.  Multiplanar reconstructions are generated.  Dose reduction techniques were used. Dose information is transmitted to the ACR (American College of Radiology) NRDR (National Radiology Data Registry) which includes the Dose Index Registry.  PATIENT STATED HISTORY: (As transcribed by Technologist)  Pt with ams and weakness.     FINDINGS:  Normal alignment of the cervical spine.  No acute osseous injuries are noted.  There is moderate to extensive osteoarthritis of the atlantoaxial joint.  There is mild to moderate diffuse posterior articular facet joint osteoarthritis as well.  There is extensive disc height loss  and endplate degenerative changes involving C5-6 and C6-7 consistent with extensive disc disease at both levels.  There is associated mild to moderate left neural foraminal stenosis at both levels, without discrete evidence of significant central canal stenosis.  The paraspinal soft tissues are unremarkable.            CONCLUSION:  1. No acute process. 2. Mild to moderate multilevel posterior articular facet joint osteoarthritis. 3. Extensive disc disease at C5-6 and C6-7 with suggestion of secondary mild to moderate left neural foraminal stenosis at both levels.  No CT evidence of significant central canal stenosis.    LOCATION:  [facility]   Dictated by (CST): Edwige Linda DO on 2/03/2025 at 9:47 PM     Finalized by (CST): Edwige Linda DO on 2/03/2025 at 9:50 PM       CT BRAIN OR HEAD (CPT=70450)    Result Date: 2/3/2025  PROCEDURE:  CT BRAIN OR HEAD (41303)  COMPARISON:  None.  INDICATIONS:  Patient presents with altered mental status/confusion and new onset of weakness.  TECHNIQUE:  Noncontrast CT scanning is performed through the brain. Dose reduction techniques were used. Dose information is transmitted to the ACR (American College of Radiology) NRDR (National Radiology Data Registry) which includes the Dose Index Registry.  PATIENT STATED HISTORY: (As transcribed by Technologist)  Pt with ams and weakness.    FINDINGS:  VENTRICLES/SULCI:  Ventricles and sulci are prominent. INTRACRANIAL:  No acute intracranial hemorrhage, mass effect or midline shift.  There is mild to moderate diffuse atrophy and periventricular/subcortical white matter disease.  There is a remote, CSF density lacunar infarct within the left basal ganglia. SINUSES:           No sign of acute sinusitis.  MASTOIDS:          No sign of acute inflammation. SKULL:             No evidence for fracture or osseous abnormality. OTHER:             None.            CONCLUSION:  1. There is no evidence of an acute process. 2. Mild to moderate diffuse  atrophy and white matter disease consistent with chronic small vessel ischemic changes. 3. 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    LOCATION:  [facility]   Dictated by (CST): Edwige Linda DO on 2/03/2025 at 9:38 PM     Finalized by (CST): Edwige Linda DO on 2/03/2025 at 9:39 PM       XR CHEST AP PORTABLE  (CPT=71045)    Result Date: 2/3/2025  PROCEDURE:  XR CHEST AP PORTABLE  (CPT=71045)  TECHNIQUE:  AP chest radiograph was obtained.  COMPARISON:  [facility] , XR, XR CHEST AP PORTABLE  (CPT=71010), 12/11/2015, 10:07 AM.  INDICATIONS:  cough  PATIENT STATED HISTORY: (As transcribed by Technologist)  Patient offered no additional history at this time.      FINDINGS:  The lungs are clear.  Stable changes of prior CABG.  Cardiac silhouette is within normal limits for size.  Normal pulmonary vasculature.  No pleural disease.  No acute osseous findings.            CONCLUSION:  1. Stable changes of prior CABG. 2. Lungs are clear, no evidence of acute cardiopulmonary disease.   LOCATION:  [facility]      Dictated by (CST): Edwige Linda DO on 2/03/2025 at 8:02 PM     Finalized by (CST): Edwige Linda DO on 2/03/2025 at 8:03 PM             MDM      This is an 89-year-old woman here for evaluation of fatigue possible malodorous urine from the nursing home.  She is afebrile hemodynamically stable here.  No specific complaints on exam appears to be neurologically intact.  Differential includes urinary tract infection electrolyte abnormality viral syndrome.  Urinalysis does not appear consistent with infection, family states she seems more fatigued and tired than usual CT head CT cervical spine shows no acute abnormality.  She did test positive for COVID which I believe is likely causing patient's symptoms basic labs showed slightly elevated liver enzymes CT abdomen pelvis.  Shows no evidence of biliary obstruction patient will be discharged home with a prescription for Paxlovid she is afebrile hemodynamically  stable O2 saturation on room air is 100%.  Follow-up with PMD, return precautions discussed with family they are in agreement.    I independently viewed and interpreted the following imaging: CT head with no acute intracranial hemorrhage        Medical Decision Making      Disposition and Plan     Clinical Impression:  1. COVID-19    2. Elevated liver enzymes    3. Neck pain         Disposition:  Discharge  2/3/2025 10:37 pm    Follow-up:  Michael Gonzalez MD  [address]  [facility] [address]  [phone]    Follow up  Follow-up with your PMD for reevaluation in 24 to 48 hours.  Return to ER if symptoms worsen or change or if any other new concerns.  Follow-up with primary doctor to have liver enzymes rechecked in 1 week          Medications Prescribed:  Discharge Medication List as of 2/3/2025 10:41 PM        START taking these medications    Details   nirmatrelvir-ritonavir 150-100 MG Oral Tablet Therapy Pack Take one nirmatrelvir tablet (150mg) with one ritonavir tablet (100mg) together twice daily for 5 days.   (Nirmatrelvir dose is renally adjusted) Do not take your atorvastatin during the 5 days which you are taking this medication., Normal, Disp-20 table t, R-0                 Supplementary Documentation:

## 2025-02-06 RX ORDER — MEMANTINE HYDROCHLORIDE 5 MG/1
5 TABLET ORAL 2 TIMES DAILY
Qty: 90 TABLET | Refills: 1 | OUTPATIENT
Start: 2025-02-06

## 2025-02-06 NOTE — TELEPHONE ENCOUNTER
Refilled 2 weeks ago    Requested Prescriptions     Refused Prescriptions Disp Refills    MEMANTINE 5 MG Oral Tab [Pharmacy Med Name: MEMANTINE 5MG TABLETS] 90 tablet 1     Sig: TAKE 1 TABLET(5 MG) BY MOUTH TWICE DAILY     Refused By: DIPTI SOLIMAN     Reason for Refusal: Patient has requested refill too soon

## 2025-02-11 LAB
BACTERIA BLD CULT: POSITIVE
STAPHYLOCOCCUS SPECIES NOT AUREUS, EPIDERMIDIS, OR LUGDUNENSIS: DETECTED

## 2025-03-25 DIAGNOSIS — E78.2 MIXED HYPERLIPIDEMIA: ICD-10-CM

## 2025-03-25 DIAGNOSIS — K21.9 GASTROESOPHAGEAL REFLUX DISEASE: ICD-10-CM

## 2025-03-25 RX ORDER — PANTOPRAZOLE SODIUM 40 MG/1
40 TABLET, DELAYED RELEASE ORAL EVERY MORNING
Qty: 90 TABLET | Refills: 3 | OUTPATIENT
Start: 2025-03-25

## 2025-03-25 RX ORDER — ROSUVASTATIN CALCIUM 40 MG/1
40 TABLET, COATED ORAL DAILY
Qty: 90 TABLET | Refills: 3 | OUTPATIENT
Start: 2025-03-25

## 2025-04-09 ENCOUNTER — LAB ENCOUNTER (OUTPATIENT)
Dept: LAB | Age: 89
End: 2025-04-09
Attending: FAMILY MEDICINE
Payer: MEDICARE

## 2025-04-09 ENCOUNTER — OFFICE VISIT (OUTPATIENT)
Dept: FAMILY MEDICINE CLINIC | Facility: CLINIC | Age: 89
End: 2025-04-09
Payer: MEDICARE

## 2025-04-09 ENCOUNTER — TELEPHONE (OUTPATIENT)
Dept: FAMILY MEDICINE CLINIC | Facility: CLINIC | Age: 89
End: 2025-04-09

## 2025-04-09 VITALS
OXYGEN SATURATION: 95 % | SYSTOLIC BLOOD PRESSURE: 120 MMHG | HEIGHT: 60 IN | HEART RATE: 87 BPM | RESPIRATION RATE: 16 BRPM | DIASTOLIC BLOOD PRESSURE: 58 MMHG | WEIGHT: 136.5 LBS | BODY MASS INDEX: 26.8 KG/M2

## 2025-04-09 DIAGNOSIS — E55.9 VITAMIN D DEFICIENCY: ICD-10-CM

## 2025-04-09 DIAGNOSIS — N39.41 URGE INCONTINENCE: ICD-10-CM

## 2025-04-09 DIAGNOSIS — M17.11 ARTHRITIS OF RIGHT KNEE: Primary | ICD-10-CM

## 2025-04-09 DIAGNOSIS — F02.80 ALZHEIMER'S DISEASE, UNSPECIFIED (HCC): ICD-10-CM

## 2025-04-09 DIAGNOSIS — I10 PRIMARY HYPERTENSION: ICD-10-CM

## 2025-04-09 DIAGNOSIS — E03.9 ACQUIRED HYPOTHYROIDISM: ICD-10-CM

## 2025-04-09 DIAGNOSIS — I25.10 CORONARY ARTERY DISEASE INVOLVING NATIVE HEART WITHOUT ANGINA PECTORIS, UNSPECIFIED VESSEL OR LESION TYPE: ICD-10-CM

## 2025-04-09 DIAGNOSIS — E78.2 MIXED HYPERLIPIDEMIA: ICD-10-CM

## 2025-04-09 DIAGNOSIS — R73.01 ELEVATED FASTING GLUCOSE: ICD-10-CM

## 2025-04-09 DIAGNOSIS — M40.202 KYPHOSIS OF CERVICAL REGION, UNSPECIFIED KYPHOSIS TYPE: ICD-10-CM

## 2025-04-09 DIAGNOSIS — G30.9 ALZHEIMER'S DISEASE, UNSPECIFIED (HCC): ICD-10-CM

## 2025-04-09 DIAGNOSIS — Z13.0 SCREENING, ANEMIA, DEFICIENCY, IRON: ICD-10-CM

## 2025-04-09 LAB
ALBUMIN SERPL-MCNC: 4.3 G/DL (ref 3.2–4.8)
ALBUMIN/GLOB SERPL: 1.7 {RATIO} (ref 1–2)
ALP LIVER SERPL-CCNC: 110 U/L (ref 55–142)
ALT SERPL-CCNC: 83 U/L (ref 10–49)
ANION GAP SERPL CALC-SCNC: 6 MMOL/L (ref 0–18)
AST SERPL-CCNC: 70 U/L (ref ?–34)
BASOPHILS # BLD AUTO: 0.07 X10(3) UL (ref 0–0.2)
BASOPHILS NFR BLD AUTO: 1 %
BILIRUB SERPL-MCNC: 0.3 MG/DL (ref 0.2–1.1)
BUN BLD-MCNC: 26 MG/DL (ref 9–23)
CALCIUM BLD-MCNC: 10.1 MG/DL (ref 8.7–10.6)
CHLORIDE SERPL-SCNC: 103 MMOL/L (ref 98–112)
CHOLEST SERPL-MCNC: 132 MG/DL (ref ?–200)
CO2 SERPL-SCNC: 31 MMOL/L (ref 21–32)
CREAT BLD-MCNC: 1.61 MG/DL (ref 0.55–1.02)
EGFRCR SERPLBLD CKD-EPI 2021: 30 ML/MIN/1.73M2 (ref 60–?)
EOSINOPHIL # BLD AUTO: 0.21 X10(3) UL (ref 0–0.7)
EOSINOPHIL NFR BLD AUTO: 3.1 %
ERYTHROCYTE [DISTWIDTH] IN BLOOD BY AUTOMATED COUNT: 14.9 %
EST. AVERAGE GLUCOSE BLD GHB EST-MCNC: 131 MG/DL (ref 68–126)
FASTING PATIENT LIPID ANSWER: NO
FASTING STATUS PATIENT QL REPORTED: NO
GLOBULIN PLAS-MCNC: 2.5 G/DL (ref 2–3.5)
GLUCOSE BLD-MCNC: 77 MG/DL (ref 70–99)
HBA1C MFR BLD: 6.2 % (ref ?–5.7)
HCT VFR BLD AUTO: 39.5 % (ref 35–48)
HDLC SERPL-MCNC: 52 MG/DL (ref 40–59)
HGB BLD-MCNC: 12.8 G/DL (ref 12–16)
IMM GRANULOCYTES # BLD AUTO: 0.01 X10(3) UL (ref 0–1)
IMM GRANULOCYTES NFR BLD: 0.1 %
LDLC SERPL CALC-MCNC: 64 MG/DL (ref ?–100)
LYMPHOCYTES # BLD AUTO: 1.6 X10(3) UL (ref 1–4)
LYMPHOCYTES NFR BLD AUTO: 23.6 %
MCH RBC QN AUTO: 30.8 PG (ref 26–34)
MCHC RBC AUTO-ENTMCNC: 32.4 G/DL (ref 31–37)
MCV RBC AUTO: 95 FL (ref 80–100)
MONOCYTES # BLD AUTO: 0.76 X10(3) UL (ref 0.1–1)
MONOCYTES NFR BLD AUTO: 11.2 %
NEUTROPHILS # BLD AUTO: 4.12 X10 (3) UL (ref 1.5–7.7)
NEUTROPHILS # BLD AUTO: 4.12 X10(3) UL (ref 1.5–7.7)
NEUTROPHILS NFR BLD AUTO: 61 %
NONHDLC SERPL-MCNC: 80 MG/DL (ref ?–130)
OSMOLALITY SERPL CALC.SUM OF ELEC: 294 MOSM/KG (ref 275–295)
PLATELET # BLD AUTO: 295 10(3)UL (ref 150–450)
POTASSIUM SERPL-SCNC: 4.2 MMOL/L (ref 3.5–5.1)
PROT SERPL-MCNC: 6.8 G/DL (ref 5.7–8.2)
RBC # BLD AUTO: 4.16 X10(6)UL (ref 3.8–5.3)
SODIUM SERPL-SCNC: 140 MMOL/L (ref 136–145)
T4 FREE SERPL-MCNC: 1.3 NG/DL (ref 0.8–1.7)
TRIGL SERPL-MCNC: 81 MG/DL (ref 30–149)
TSI SER-ACNC: 8.54 UIU/ML (ref 0.55–4.78)
VIT D+METAB SERPL-MCNC: 32.8 NG/ML (ref 30–100)
VLDLC SERPL CALC-MCNC: 12 MG/DL (ref 0–30)
WBC # BLD AUTO: 6.8 X10(3) UL (ref 4–11)

## 2025-04-09 PROCEDURE — 84443 ASSAY THYROID STIM HORMONE: CPT

## 2025-04-09 PROCEDURE — 80061 LIPID PANEL: CPT

## 2025-04-09 PROCEDURE — 82306 VITAMIN D 25 HYDROXY: CPT

## 2025-04-09 PROCEDURE — 36415 COLL VENOUS BLD VENIPUNCTURE: CPT

## 2025-04-09 PROCEDURE — 84439 ASSAY OF FREE THYROXINE: CPT

## 2025-04-09 PROCEDURE — 80053 COMPREHEN METABOLIC PANEL: CPT

## 2025-04-09 PROCEDURE — 99214 OFFICE O/P EST MOD 30 MIN: CPT | Performed by: FAMILY MEDICINE

## 2025-04-09 PROCEDURE — 83036 HEMOGLOBIN GLYCOSYLATED A1C: CPT

## 2025-04-09 PROCEDURE — 85025 COMPLETE CBC W/AUTO DIFF WBC: CPT

## 2025-04-09 RX ORDER — MIRABEGRON 50 MG/1
50 TABLET, FILM COATED, EXTENDED RELEASE ORAL DAILY
Qty: 90 TABLET | Refills: 3 | Status: SHIPPED | OUTPATIENT
Start: 2025-04-09

## 2025-04-09 NOTE — TELEPHONE ENCOUNTER
R knee pain    Discussed case with physiatry team.  Might be able to help with the chronic pains.      Can we get her the information for Dr. Arias?  Will place referral

## 2025-04-09 NOTE — TELEPHONE ENCOUNTER
Spoke with patient's daughter in law, all information given. Daughter in law verbalized understanding. No further concerns.

## 2025-04-09 NOTE — PROGRESS NOTES
Chief Complaint   Patient presents with    Follow - Up     Patient here for follow up on memory and other health issues       HPI:   Abbi Perez is a 89 year old female with past medical history notable for coronary artery disease, Alzheimer's, hypothyroidism who presents to the office for 6-month checkup.    Heart - CAD, but stable many years.  HTN, HLD.  .  On Crestor, Zetia, ASA, metoprolol.      Dementia - taking donepezil and memantine.  Has some word finding difficulties,  frustrating for her.      Thyroid - no labs since 2023.  Currently taking 88mcg supplement.      Ortho - R knee soreness.  Has seen ortho team.  The last injections have not helped.  Is on meloxicam, and this may be helping some.  Also on Tylenol.  Bracing does not seem to help.  This is a considerable pain for her.    Also has a pain in her middle back.      - on myrbetriq and fesoterodine.      REVIEW OF SYSTEMS:   Pertinent items are noted in HPI.  EXAM:   /58   Pulse 87   Resp 16   Ht 5' (1.524 m)   Wt 136 lb 8 oz (61.9 kg)   SpO2 95%   BMI 26.66 kg/m²   Wt Readings from Last 6 Encounters:   04/09/25 136 lb 8 oz (61.9 kg)   02/03/25 138 lb 14.2 oz (63 kg)   10/07/24 140 lb 2 oz (63.6 kg)   02/27/24 152 lb 2 oz (69 kg)   10/09/23 150 lb (68 kg)   06/23/23 157 lb 9.6 oz (71.5 kg)      General Appearance:  Alert, cooperative, no distress, appears stated age.  Kyphosis of the cervical spine.  Uses 2-wheel walker to help gait.    Head:  Normocephalic, without obvious abnormality, atraumatic   Eyes:  PERRL, conjunctiva/corneas clear, EOM's intact   Neck: Supple, symmetrical, trachea midline, no adenopathy   Back:   Symmetric, no curvature, ROM normal, no CVA tenderness   Lungs:   Clear to auscultation bilaterally, respirations unlabored   Chest Wall:  No tenderness or deformity   Heart:  Regular rate and rhythm   Abdomen:   Soft, non-tender, bowel sounds active all four quadrants,  no masses, no organomegaly   Extremities:  Extremities normal, atraumatic, no cyanosis or edema   Pulses: 2+ and symmetric   Skin: Skin color, texture, turgor normal, no rashes or lesions   Neurologic: Normal      ASSESSMENT AND PLAN:     Abbi Perez was seen in the office today:  had concerns including Follow - Up (Patient here for follow up on memory and other health issues ).    1. Arthritis of right knee  Her main problem is the knee pain.  Was able to see the orthopedic team last summer, but they deemed her a nonsurgical candidate and recommended medications and bracing.  This has so far been ineffective.  The medications help take this thing away some, but she is still limited by the knee pain  Looking for alternative method of care.  She is pretty desperate to help with the pains, mentioning multiple times that this is such a big problem for her    2. Kyphosis of cervical region, unspecified kyphosis type  Worsening kyphosis  She has therapy set up for her facility.  Will have them try to focus on this    3. Alzheimer's disease, unspecified (HCC)  Ongoing  Mainly manifesting as word finding problems.  Will be in the middle of a sentence and then stop and lose her train of thought  She is currently on the max dose of the donepezil and memantine  Will continue to monitor    4. Coronary artery disease involving native heart without angina pectoris, unspecified vessel or lesion type  Heart has been stable for quite some time  Risk factors are controlled.  Medicines are stable    5. Primary hypertension  Blood pressure at goal today  Continue the metoprolol    6. Mixed hyperlipidemia  Currently on aspirin, Zetia, rosuvastatin  Continue.  Will check labs    7. Elevated fasting glucose  Blood sugar noted to be elevated in the past, but has been a long time since she has had blood work done routinely  Will recheck A1c and glucose    8. Urge incontinence  On combination of Myrbetriq and fesoterodine to help with her urinary incontinence  This helps  some    9. Acquired hypothyroidism  TSH overdue  Currently on levothyroxine 88 mcg          Michael Gonzalez M.D.   [facility]  04/09/25        Note to patient: The 21 Century Cures Act makes medical notes like these available to patients in the interest of transparency. However, be advised this is a medical document. It is intended as peer to peer communication. It is written in medical language and may contain abbreviations or verbiage that are unfamiliar. It may appear blunt or direct. Medical documents are intended to carry relevant information, facts as evident, and the clinical opinion of the practitioner.

## 2025-04-28 ENCOUNTER — PATIENT MESSAGE (OUTPATIENT)
Dept: FAMILY MEDICINE CLINIC | Facility: CLINIC | Age: 89
End: 2025-04-28

## 2025-04-28 DIAGNOSIS — R74.8 ELEVATED LIVER ENZYMES: ICD-10-CM

## 2025-04-28 DIAGNOSIS — E03.9 ACQUIRED HYPOTHYROIDISM: Primary | ICD-10-CM

## 2025-04-29 RX ORDER — LEVOTHYROXINE SODIUM 100 UG/1
100 TABLET ORAL DAILY
Qty: 90 TABLET | Refills: 3 | Status: SHIPPED | OUTPATIENT
Start: 2025-04-29 | End: 2026-04-24

## 2025-04-29 NOTE — TELEPHONE ENCOUNTER
TSH elevated.  Increase med to 100mcg  Recheck TSH in 6-8 weeks   Also added labs to look at kidney and liver again.

## 2025-05-09 ENCOUNTER — OFFICE VISIT (OUTPATIENT)
Dept: PODIATRY CLINIC | Facility: CLINIC | Age: 89
End: 2025-05-09

## 2025-05-09 DIAGNOSIS — B35.1 ONYCHOMYCOSIS: Primary | ICD-10-CM

## 2025-05-09 DIAGNOSIS — L60.3 NAIL DYSTROPHY: ICD-10-CM

## 2025-05-09 DIAGNOSIS — M20.41 HAMMER TOES OF BOTH FEET: ICD-10-CM

## 2025-05-09 DIAGNOSIS — M20.42 HAMMER TOES OF BOTH FEET: ICD-10-CM

## 2025-05-09 NOTE — PROGRESS NOTES
[facility]  Progress Note      Abbi Perez is a 89 year old female.   Chief Complaint   Patient presents with    Toenail Care     LOV 4/9/25 with PCP Dr. Gonzalez- fadia pain         HPI:     Abbi Perez is a pleasant 89 year old female who presents to the clinic today for routine nail care and foot exam. Pt complains of elongated, thickened, and incurvated nails and has difficulty trimming on her own. She denies any open sores to feet. Patient is ambulatory today with slip on shoes utilizing a walker. Past medical history, medications, allergies reviewed.       Allergies: Patient has no known allergies.   Current Medications[1]   Past Medical History[2]   Past Surgical History[3]   Family History[4]   Social Hx on file[5]        REVIEW OF SYSTEMS:     10 point ROS completed and was negative, except for pertinent positive and negatives stated in subjective.       EXAM:     GENERAL: well developed, well nourished, in no apparent distress  EXTREMITIES:  1. Integument: The patient's nails appear incurvated, thickened, elongated, dystrophic, discolored with subungual debris 1-5 right, 1-5 left nails. Skin appears moist, warm, and supple with positive hair growth. There are no color changes. No open lesions. No macerations. No Hyperkeratotic lesions.   2. Vascular: Pedal pulses palpable, capillary refill normal. Patient has moderate BLE edema.   3. Neurological: Gross sensation intact via light touch bilaterally.  Normal sharp/dull sensation.   4. Musculoskeletal: All muscle groups are graded 5/5 in the foot and ankle. Flexion contracture of lesser digits.        ASSESSMENT AND PLAN:   Diagnoses and all orders for this visit:    Onychomycosis    Hammer toes of both feet    Nail dystrophy    Patient was seen and evaluated today in clinic.  Chart history reviewed.    - At today's visit sharply debrided nails with a sterile nail nipper achieving a 20% reduction in thickness and length, without  incident. Slant back procedure performed to ingrown nails. Nails further smoothed with dremel. Patient verbalizes decreased pain to toenails post debridement.     - Discussed importance of proper pedal hygiene and regular foot checks    - Patient to avoid walking barefoot. Recommend ambulating with supportive shoes and inserts.     -Educated patient on acute signs of infection. Advised patient to seek immediate medical attention if any concerns arise.  -All of the patient's questions and concerns were addressed.  Patient indicated understanding of these issues and agrees to the plan.        Time spent reviewing pertinent information from patient's chart, reviewing any pertinent imaging, obtaining history and physical exam, discussing and mutually agreeing on a treatment plan, and documenting encounter: 20 minutes    RTC 2-3 months      CRISTOFER Xiao        [facility]            Dragon speech recognition software was used to prepare this note.  Errors in word recognition may occur.  Please contact me with any questions/concerns with this note.        [1]   Current Outpatient Medications   Medication Sig Dispense Refill    levothyroxine 100 MCG Oral Tab Take 1 tablet (100 mcg total) by mouth daily. 90 tablet 3    MYRBETRIQ 50 MG Oral Tablet 24 Hr TAKE 1 TABLET(50 MG) BY MOUTH DAILY 90 tablet 3    MEMANTINE 5 MG Oral Tab TAKE 1 TABLET(5 MG) BY MOUTH TWICE DAILY 90 tablet 1    ezetimibe 10 MG Oral Tab Take 1 tablet (10 mg total) by mouth daily. 90 tablet 3    metoprolol tartrate 25 MG Oral Tab TAKE 1 TABLET BY MOUTH EVERY DAY 90 tablet 3    rosuvastatin 40 MG Oral Tab Take 1 tablet (40 mg total) by mouth daily. 90 tablet 3    pantoprazole 40 MG Oral Tab EC Take 1 tablet (40 mg total) by mouth every morning. 90 tablet 3    Meloxicam 15 MG Oral Tab Take 1 tablet (15 mg total) by mouth daily as needed for Pain. 90 tablet 1    donepezil 10 MG Oral Tab Take 1 tablet (10 mg total) by mouth  nightly. 90 tablet 3    Fesoterodine Fumarate ER 8 MG Oral Tablet 24 Hr Take 1 tablet (8 mg total) by mouth daily. 90 tablet 3    docusate sodium 100 MG Oral Cap Take 1 capsule (100 mg total) by mouth 2 (two) times daily as needed for constipation. 60 capsule 3    Multiple Vitamin (ONE-DAILY MULTI VITAMINS) Oral Tab Take 1 tablet by mouth 3 (three) times a week.      aspirin 81 MG Oral Tab Take 1 tablet (81 mg total) by mouth daily.     [2]   Past Medical History:   Atherosclerosis of coronary artery    Bursitis of shoulder, right    Chest pain    Coronary atherosclerosis     Disorder of thyroid    Esophageal reflux    Essential hypertension    Hyperlipidemia    Hypertension, benign    Hypothyroidism    S/P CABG x 5    Type II  diabetes mellitus     Unspecified vitamin D deficiency   [3]   Past Surgical History:  Procedure Laterality Date    Angioplasty (coronary)  02    RCA graft stent    Angioplasty (coronary)  04    stent RCA graft, IABP    Cabg  1995    x5    Colonoscopy  2004    Hysterectomy  1985    partial hystero.    Leg/ankle surgery proc unlisted      angioplasty of left leg    Leg/ankle surgery proc unlisted  2002    angioplasty of right leg    Surgical stent      Coronary Artery Stent    Tonsillectomy  89    Total abdom hysterectomy     [4]   Family History  Problem Relation Age of Onset    Heart Disease Brother         Triple CABG    Heart Disease Mother    [5]   Social History  Socioeconomic History    Marital status:     Number of children: 2   Occupational History    Occupation: retired   Tobacco Use    Smoking status: Former     Current packs/day: 0.00     Types: Cigarettes     Quit date: 1995     Years since quittin.3    Smokeless tobacco: Never   Vaping Use    Vaping status: Never Used   Substance and Sexual Activity    Alcohol use: Not Currently     Alcohol/week: 0.0 - 1.0 standard drinks of alcohol     Comment: 1 per month    Drug use: No   Other Topics  Concern    Caffeine Concern Yes     Comment: 1-2 cups of coffee daily    Exercise No     Comment: active    Seat Belt Yes    Self-Exams No

## 2025-06-26 DIAGNOSIS — M17.11 ARTHRITIS OF RIGHT KNEE: ICD-10-CM

## 2025-06-30 RX ORDER — MELOXICAM 15 MG/1
15 TABLET ORAL DAILY PRN
Qty: 90 TABLET | Refills: 1 | Status: SHIPPED | OUTPATIENT
Start: 2025-06-30

## 2025-06-30 RX ORDER — MELOXICAM 15 MG/1
15 TABLET ORAL DAILY PRN
Qty: 90 TABLET | Refills: 1 | OUTPATIENT
Start: 2025-06-30

## 2025-06-30 NOTE — TELEPHONE ENCOUNTER
Please review: Medication passes protocol, but unable to refill due to medium/high/very high drug interaction warning copied here:    High  Drug-Drug: aspirin and MeloxicamNon-selective nonsteroidal anti-inflammatory agents (eg, NSAIDs (non-selective)) may diminish the cardioprotective effect of aspirin. Non-selective nonsteroidal anti-inflammatory agents (eg, NSAIDs (non-selective)) may enhance the adverse/toxic effect of aspirin. An increased risk of bleeding may be associated with use of this combination. Aspirin may decrease the serum concentration of non-selective nonsteroidal anti-inflammatory agents (eg, NSAIDs (non-selective)).    Refill passes per [facility] protocol.    No future appointments with primary care medicine

## (undated) DIAGNOSIS — E03.9 ACQUIRED HYPOTHYROIDISM: ICD-10-CM

## (undated) DEVICE — MEGADYNE ELECTRODE ADULT PT RT

## (undated) DEVICE — BANDAID CURAD 3IN X 1IN

## (undated) DEVICE — REMOVER PREP SOLUTION 4OZ

## (undated) DEVICE — PAIN TRAY: Brand: MEDLINE INDUSTRIES, INC.

## (undated) DEVICE — RF CANNULA, CVD: Brand: VENOM

## (undated) DEVICE — GLOVE SURG SENSICARE SZ 7

## (undated) DEVICE — SKIN MARKER DUAL TIP WITH RULER CAP AND LABELS: Brand: DEVON

## (undated) DEVICE — GLOVE SURG SENSICARE SZ 7-1/2

## (undated) DEVICE — NEEDLE SPINAL 22X3-1/2 BLK

## (undated) NOTE — ED AVS SNAPSHOT
Arnav Li   MRN: [id number]    Department:  [facility] Emergency Department in [place]   Date of Visit:  9/14/2018           Disclosure     Insurance plans vary and the physician(s) referred by the ER may not be covered by your plan.  Please contac tell this physician (or your personal doctor if your instructions are to return to your personal doctor) about any new or lasting problems. The primary care or specialist physician will see patients referred from the [facility] Emergency Department.  Shelton Lombard

## (undated) NOTE — Clinical Note
Got a physiatry question for you - pretty bad knee pain in a non surgical candidate.  Injections have not worked.  What do we do next?  Are there genicular nerve ablations or anything to try? Thanks! -alexx

## (undated) NOTE — LETTER
AUTHORIZATION FOR SURGICAL OPERATION OR OTHER PROCEDURE    1.  I hereby authorize Dr. Morgan Velez, and [facility] staff assigned to my case to perform the following operation and/or procedure at the [facility]:    ________________________________ Date:  ______/______/_____                    Time:  ________ A. M.  P.M.        Patient Name:  ______________________________________________________  (please print)      Patient signature:  ___________________________________________________             Re

## (undated) NOTE — LETTER
[facility] [address], [address]  Consent for Procedure/Sedation    Date:     Time:       1.  I authorize the performance upon Minal Sparrow the following:cardiac catheterization, left ventricular cineangiography, bilateral juanis recovery period ends for purposes of reinstating the Do Not Resuscitate (DNR) order.     Signature of Patient: ____________________________________________________    Signature of person authorized                                     Relationship to  to con

## (undated) NOTE — MR AVS SNAPSHOT
[address], [address] [address] [phone]               Thank you for choosing us for your health care visit with Lacho Dillon MD.  We are glad to serve you and happy to provide you with this 3 (moderate) [N18.3]           Monoclonal Protein Study    Complete by:  Apr 18, 2017 (Approximate)    Assoc Dx:   LUCIEN (acute kidney injury) ([facility] [address] [N17.9], Hypotension, unspecified hypotension type [I95.9], CKD (chronic kidney disease), stage 3 (moderate) [ Valsartan-Hydrochlorothiazide 160-12.5 MG Tabs   TAKE 1 TABLET BY MOUTH EVERY DAY                   Francisco Javier                  Visit [facility] online at  [email or web address]

## (undated) NOTE — LETTER
Date: 10/7/2024    Patient Name: Abbi Perez          To Whom it may concern:    This letter has been written at the patient's request. The above patient was seen at [facility] for treatment of a medical condition.    Due to advancing dementia, please ensure that any large financial or impactful decision be assisted by family members.  Thank you for your attention to this matter        Sincerely,          Michael Gonzalez MD

## (undated) NOTE — MR AVS SNAPSHOT
[address] [facility] [address]  [facility],  [address]  [address] [place] [address] [phone]               Thank you for choosing us for your health care visit with Salbador Whaley MD.  We are glad to serve you and happy to provide you with this s physician's office. At that time, you will be provided with any authorization numbers or be assured that none are required. You can then schedule your appointment.  Failure to obtain required authorization numbers can create reimbursement difficulties for y OSCAL 500/200 D-3 500-200 MG-UNIT Tabs   Generic drug:  Calcium Carbonate-Vitamin D   Take 1 Tab by mouth daily. Pantoprazole Sodium 40 MG Tbec   Take 1 tablet (40 mg total) by mouth daily.    Commonly known as:  PROTONIX           Rosuvastatin C ? Install sturdy handrails on both sides. ? Make sure carpeting is secure. FLOORS:  ? Remove all loose wires, cords and throw rugs. ? Keep floors clear of clutter. ? Make sure carpets and area rugs have skid proof backing.   ? Do not use slippery wax on increments are effective and add up over the week   2 ½ hours per week – spread out over time Use a yanelis to keep you motivated   Don’t forget strength training with weights and resistance Set goals and track your progress   You don’t need to join a gym.

## (undated) NOTE — MR AVS SNAPSHOT
[facility], [place] [id number] [facility], [address] [address] [phone]               Thank you for choosing us for your health care visit with Inna Aguero MD.  We are glad to serve you and happy to provide you with this Levothyroxine Sodium 88 MCG Tabs   TAKE 1 TABLET BY MOUTH DAILY BEFORE BREAKFAST   Commonly known as:  SYNTHROID, LEVOTHROID           metoprolol Tartrate 25 MG Tabs   Take 1 tablet (25 mg total) by mouth 2 (two) times daily.    Commonly known as:  Manoj Clinton

## (undated) NOTE — MR AVS SNAPSHOT
[facility]  [facility],  [address] [place] [address] [phone]               Thank you for choosing us for your health care visit with Brii Gaines MD.  We are glad to serve you and happy to provide you with this summa This list is accurate as of: 6/15/17 11:09 AM.  Always use your most recent med list.                acetaminophen 325 MG Tabs   Take 325 mg by mouth every 6 (six) hours as needed for Pain.    Commonly known as:  TYLENOL           azithromycin 250 MG Tabs Visit [facility] online at  [email or web address]

## (undated) NOTE — LETTER
July 19, 2017    [address]  [address] [place], [place] [phone]    Dear Agueda St: It was a pleasure speaking with you over the phone recently.  To follow up, I wanted to send you some contact information to utilize when you have a question an